# Patient Record
Sex: FEMALE | Race: WHITE | NOT HISPANIC OR LATINO | Employment: OTHER | URBAN - METROPOLITAN AREA
[De-identification: names, ages, dates, MRNs, and addresses within clinical notes are randomized per-mention and may not be internally consistent; named-entity substitution may affect disease eponyms.]

---

## 2017-11-08 ENCOUNTER — HOSPITAL ENCOUNTER (INPATIENT)
Facility: HOSPITAL | Age: 82
LOS: 13 days | Discharge: NURSING, REHAB OR LONG-TERM CARE | End: 2017-11-21
Attending: INTERNAL MEDICINE | Admitting: INTERNAL MEDICINE

## 2017-11-08 ENCOUNTER — APPOINTMENT (OUTPATIENT)
Dept: GENERAL RADIOLOGY | Facility: HOSPITAL | Age: 82
End: 2017-11-08

## 2017-11-08 DIAGNOSIS — A41.51 SEPSIS DUE TO ESCHERICHIA COLI (HCC): Primary | ICD-10-CM

## 2017-11-08 DIAGNOSIS — N17.9 ACUTE RENAL FAILURE, UNSPECIFIED ACUTE RENAL FAILURE TYPE (HCC): ICD-10-CM

## 2017-11-08 DIAGNOSIS — Z74.09 IMPAIRED FUNCTIONAL MOBILITY, BALANCE, GAIT, AND ENDURANCE: ICD-10-CM

## 2017-11-08 PROBLEM — A41.9 SEPSIS (HCC): Status: ACTIVE | Noted: 2017-11-08

## 2017-11-08 LAB
ANION GAP SERPL CALCULATED.3IONS-SCNC: 21 MMOL/L (ref 3–11)
APTT PPP: 32.2 SECONDS (ref 24–31)
ARTERIAL PATENCY WRIST A: ABNORMAL
ATMOSPHERIC PRESS: ABNORMAL MMHG
BACTERIA UR QL AUTO: ABNORMAL /HPF
BASE EXCESS BLDA CALC-SCNC: -8.1 MMOL/L (ref 0–2)
BASOPHILS # BLD AUTO: 0.05 10*3/MM3 (ref 0–0.2)
BASOPHILS NFR BLD AUTO: 0.3 % (ref 0–1)
BDY SITE: ABNORMAL
BILIRUB UR QL STRIP: NEGATIVE
BNP SERPL-MCNC: 532 PG/ML (ref 0–100)
BUN BLD-MCNC: 68 MG/DL (ref 9–23)
BUN/CREAT SERPL: 15.1 (ref 7–25)
CA-I SERPL ISE-MCNC: 1.03 MMOL/L (ref 1.12–1.32)
CALCIUM SPEC-SCNC: 7.1 MG/DL (ref 8.7–10.4)
CHLORIDE SERPL-SCNC: 105 MMOL/L (ref 99–109)
CK SERPL-CCNC: 49 U/L (ref 26–174)
CLARITY UR: ABNORMAL
CLUMPED PLATELETS: PRESENT
CO2 BLDA-SCNC: 17.9 MMOL/L (ref 22–33)
CO2 SERPL-SCNC: 10 MMOL/L (ref 20–31)
COHGB MFR BLD: 1.3 % (ref 0–2)
COLOR UR: YELLOW
CREAT BLD-MCNC: 4.5 MG/DL (ref 0.6–1.3)
CREAT UR-MCNC: 18.8 MG/DL
D-LACTATE SERPL-SCNC: 1.4 MMOL/L (ref 0.5–2)
DEPRECATED RDW RBC AUTO: 49.9 FL (ref 37–54)
EOSINOPHIL # BLD AUTO: 0.48 10*3/MM3 (ref 0–0.3)
EOSINOPHIL NFR BLD AUTO: 3.3 % (ref 0–3)
ERYTHROCYTE [DISTWIDTH] IN BLOOD BY AUTOMATED COUNT: 14.2 % (ref 11.3–14.5)
GFR SERPL CREATININE-BSD FRML MDRD: 9 ML/MIN/1.73
GLUCOSE BLD-MCNC: 68 MG/DL (ref 70–100)
GLUCOSE BLDC GLUCOMTR-MCNC: 64 MG/DL (ref 70–130)
GLUCOSE BLDC GLUCOMTR-MCNC: 71 MG/DL (ref 70–130)
GLUCOSE UR STRIP-MCNC: NEGATIVE MG/DL
HCO3 BLDA-SCNC: 16.9 MMOL/L (ref 20–26)
HCT VFR BLD AUTO: 36.3 % (ref 34.5–44)
HCT VFR BLD CALC: 33.5 %
HGB BLD-MCNC: 12.6 G/DL (ref 11.5–15.5)
HGB BLDA-MCNC: 10.9 G/DL (ref 14–18)
HGB UR QL STRIP.AUTO: ABNORMAL
HOROWITZ INDEX BLD+IHG-RTO: 28 %
HYALINE CASTS UR QL AUTO: ABNORMAL /LPF
IMM GRANULOCYTES # BLD: 0.11 10*3/MM3 (ref 0–0.03)
IMM GRANULOCYTES NFR BLD: 0.8 % (ref 0–0.6)
KETONES UR QL STRIP: NEGATIVE
LDH SERPL-CCNC: 182 U/L (ref 120–246)
LEUKOCYTE ESTERASE UR QL STRIP.AUTO: ABNORMAL
LYMPHOCYTES # BLD AUTO: 1.2 10*3/MM3 (ref 0.6–4.8)
LYMPHOCYTES NFR BLD AUTO: 8.4 % (ref 24–44)
MAGNESIUM SERPL-MCNC: 1.9 MG/DL (ref 1.3–2.7)
MCH RBC QN AUTO: 33 PG (ref 27–31)
MCHC RBC AUTO-ENTMCNC: 34.7 G/DL (ref 32–36)
MCV RBC AUTO: 95 FL (ref 80–99)
METHGB BLD QL: 0.8 % (ref 0–1.5)
MODALITY: ABNORMAL
MONOCYTES # BLD AUTO: 1.42 10*3/MM3 (ref 0–1)
MONOCYTES NFR BLD AUTO: 9.9 % (ref 0–12)
NEUTROPHILS # BLD AUTO: 11.09 10*3/MM3 (ref 1.5–8.3)
NEUTROPHILS NFR BLD AUTO: 77.3 % (ref 41–71)
NITRITE UR QL STRIP: NEGATIVE
OSMOLALITY UR: 283 MOSM/KG (ref 300–1100)
OXYHGB MFR BLDV: 94.7 % (ref 94–99)
PCO2 BLDA: 32 MM HG (ref 35–45)
PH BLDA: 7.33 PH UNITS (ref 7.35–7.45)
PH UR STRIP.AUTO: 6 [PH] (ref 5–8)
PHOSPHATE SERPL-MCNC: 5.2 MG/DL (ref 2.4–5.1)
PLATELET # BLD AUTO: 170 10*3/MM3 (ref 150–450)
PMV BLD AUTO: 11.2 FL (ref 6–12)
PO2 BLDA: 81.1 MM HG (ref 83–108)
POTASSIUM BLD-SCNC: 3.6 MMOL/L (ref 3.5–5.5)
PROCALCITONIN SERPL-MCNC: 6.27 NG/ML
PROT UR QL STRIP: ABNORMAL
PROT UR-MCNC: 49 MG/DL (ref 1–14)
RBC # BLD AUTO: 3.82 10*6/MM3 (ref 3.89–5.14)
RBC # UR: ABNORMAL /HPF
RBC MORPH BLD: NORMAL
REF LAB TEST METHOD: ABNORMAL
SMALL PLATELETS BLD QL SMEAR: ADEQUATE
SODIUM BLD-SCNC: 136 MMOL/L (ref 132–146)
SODIUM UR-SCNC: 102 MMOL/L (ref 30–90)
SP GR UR STRIP: 1.01 (ref 1–1.03)
SQUAMOUS #/AREA URNS HPF: ABNORMAL /HPF
UROBILINOGEN UR QL STRIP: ABNORMAL
VANCOMYCIN SERPL-MCNC: 14 MCG/ML (ref 5–40)
WBC MORPH BLD: NORMAL
WBC NRBC COR # BLD: 14.35 10*3/MM3 (ref 3.5–10.8)
WBC UR QL AUTO: ABNORMAL /HPF

## 2017-11-08 PROCEDURE — 25010000002 PIPERACILLIN SOD-TAZOBACTAM PER 1 G: Performed by: NURSE PRACTITIONER

## 2017-11-08 PROCEDURE — 83880 ASSAY OF NATRIURETIC PEPTIDE: CPT | Performed by: NURSE PRACTITIONER

## 2017-11-08 PROCEDURE — 85025 COMPLETE CBC W/AUTO DIFF WBC: CPT | Performed by: NURSE PRACTITIONER

## 2017-11-08 PROCEDURE — 25010000002 HEPARIN (PORCINE) PER 1000 UNITS: Performed by: INTERNAL MEDICINE

## 2017-11-08 PROCEDURE — 93010 ELECTROCARDIOGRAM REPORT: CPT | Performed by: INTERNAL MEDICINE

## 2017-11-08 PROCEDURE — 82805 BLOOD GASES W/O2 SATURATION: CPT | Performed by: NURSE PRACTITIONER

## 2017-11-08 PROCEDURE — 87077 CULTURE AEROBIC IDENTIFY: CPT | Performed by: NURSE PRACTITIONER

## 2017-11-08 PROCEDURE — 94640 AIRWAY INHALATION TREATMENT: CPT

## 2017-11-08 PROCEDURE — 85730 THROMBOPLASTIN TIME PARTIAL: CPT | Performed by: NURSE PRACTITIONER

## 2017-11-08 PROCEDURE — 82570 ASSAY OF URINE CREATININE: CPT | Performed by: NURSE PRACTITIONER

## 2017-11-08 PROCEDURE — 99291 CRITICAL CARE FIRST HOUR: CPT | Performed by: INTERNAL MEDICINE

## 2017-11-08 PROCEDURE — 93005 ELECTROCARDIOGRAM TRACING: CPT | Performed by: NURSE PRACTITIONER

## 2017-11-08 PROCEDURE — 87040 BLOOD CULTURE FOR BACTERIA: CPT | Performed by: NURSE PRACTITIONER

## 2017-11-08 PROCEDURE — 84100 ASSAY OF PHOSPHORUS: CPT | Performed by: NURSE PRACTITIONER

## 2017-11-08 PROCEDURE — 84145 PROCALCITONIN (PCT): CPT | Performed by: NURSE PRACTITIONER

## 2017-11-08 PROCEDURE — 83605 ASSAY OF LACTIC ACID: CPT | Performed by: NURSE PRACTITIONER

## 2017-11-08 PROCEDURE — 94799 UNLISTED PULMONARY SVC/PX: CPT

## 2017-11-08 PROCEDURE — 87186 SC STD MICRODIL/AGAR DIL: CPT | Performed by: NURSE PRACTITIONER

## 2017-11-08 PROCEDURE — 85007 BL SMEAR W/DIFF WBC COUNT: CPT | Performed by: NURSE PRACTITIONER

## 2017-11-08 PROCEDURE — 82330 ASSAY OF CALCIUM: CPT | Performed by: NURSE PRACTITIONER

## 2017-11-08 PROCEDURE — 83615 LACTATE (LD) (LDH) ENZYME: CPT | Performed by: NURSE PRACTITIONER

## 2017-11-08 PROCEDURE — 36600 WITHDRAWAL OF ARTERIAL BLOOD: CPT | Performed by: NURSE PRACTITIONER

## 2017-11-08 PROCEDURE — 71010 HC CHEST PA OR AP: CPT

## 2017-11-08 PROCEDURE — 83935 ASSAY OF URINE OSMOLALITY: CPT | Performed by: NURSE PRACTITIONER

## 2017-11-08 PROCEDURE — 83735 ASSAY OF MAGNESIUM: CPT | Performed by: NURSE PRACTITIONER

## 2017-11-08 PROCEDURE — 82550 ASSAY OF CK (CPK): CPT | Performed by: NURSE PRACTITIONER

## 2017-11-08 PROCEDURE — 80048 BASIC METABOLIC PNL TOTAL CA: CPT | Performed by: NURSE PRACTITIONER

## 2017-11-08 PROCEDURE — 94660 CPAP INITIATION&MGMT: CPT

## 2017-11-08 PROCEDURE — 80202 ASSAY OF VANCOMYCIN: CPT | Performed by: NURSE PRACTITIONER

## 2017-11-08 PROCEDURE — 87086 URINE CULTURE/COLONY COUNT: CPT | Performed by: NURSE PRACTITIONER

## 2017-11-08 PROCEDURE — 84156 ASSAY OF PROTEIN URINE: CPT | Performed by: NURSE PRACTITIONER

## 2017-11-08 PROCEDURE — 84300 ASSAY OF URINE SODIUM: CPT | Performed by: NURSE PRACTITIONER

## 2017-11-08 PROCEDURE — 81001 URINALYSIS AUTO W/SCOPE: CPT | Performed by: NURSE PRACTITIONER

## 2017-11-08 PROCEDURE — 82962 GLUCOSE BLOOD TEST: CPT

## 2017-11-08 RX ORDER — POTASSIUM CHLORIDE 750 MG/1
10 TABLET, FILM COATED, EXTENDED RELEASE ORAL DAILY
Status: ON HOLD | COMMUNITY
End: 2017-11-21

## 2017-11-08 RX ORDER — ONDANSETRON 2 MG/ML
4 INJECTION INTRAMUSCULAR; INTRAVENOUS EVERY 6 HOURS PRN
Status: DISCONTINUED | OUTPATIENT
Start: 2017-11-08 | End: 2017-11-21 | Stop reason: HOSPADM

## 2017-11-08 RX ORDER — IPRATROPIUM BROMIDE AND ALBUTEROL SULFATE 2.5; .5 MG/3ML; MG/3ML
3 SOLUTION RESPIRATORY (INHALATION) EVERY 6 HOURS PRN
Status: DISCONTINUED | OUTPATIENT
Start: 2017-11-08 | End: 2017-11-11

## 2017-11-08 RX ORDER — VANCOMYCIN/0.9 % SOD CHLORIDE 1.5G/250ML
20 PLASTIC BAG, INJECTION (ML) INTRAVENOUS ONCE
Status: DISCONTINUED | OUTPATIENT
Start: 2017-11-08 | End: 2017-11-08

## 2017-11-08 RX ORDER — RANITIDINE 150 MG/1
150 TABLET ORAL 2 TIMES DAILY
COMMUNITY

## 2017-11-08 RX ORDER — BISACODYL 10 MG
10 SUPPOSITORY, RECTAL RECTAL DAILY PRN
Status: DISCONTINUED | OUTPATIENT
Start: 2017-11-08 | End: 2017-11-21 | Stop reason: HOSPADM

## 2017-11-08 RX ORDER — ACETAMINOPHEN 325 MG/1
650 TABLET ORAL EVERY 4 HOURS PRN
Status: DISCONTINUED | OUTPATIENT
Start: 2017-11-08 | End: 2017-11-21 | Stop reason: HOSPADM

## 2017-11-08 RX ORDER — DEXTROSE MONOHYDRATE 25 G/50ML
25 INJECTION, SOLUTION INTRAVENOUS
Status: DISCONTINUED | OUTPATIENT
Start: 2017-11-08 | End: 2017-11-11

## 2017-11-08 RX ORDER — HEPARIN SODIUM 5000 [USP'U]/ML
5000 INJECTION, SOLUTION INTRAVENOUS; SUBCUTANEOUS EVERY 8 HOURS SCHEDULED
Status: DISCONTINUED | OUTPATIENT
Start: 2017-11-08 | End: 2017-11-17

## 2017-11-08 RX ORDER — FAMOTIDINE 10 MG/ML
20 INJECTION, SOLUTION INTRAVENOUS EVERY 12 HOURS SCHEDULED
Status: DISCONTINUED | OUTPATIENT
Start: 2017-11-08 | End: 2017-11-08

## 2017-11-08 RX ORDER — ATORVASTATIN CALCIUM 40 MG/1
40 TABLET, FILM COATED ORAL NIGHTLY
COMMUNITY

## 2017-11-08 RX ORDER — TRIAMTERENE AND HYDROCHLOROTHIAZIDE 37.5; 25 MG/1; MG/1
1 TABLET ORAL DAILY
COMMUNITY
End: 2017-11-21 | Stop reason: HOSPADM

## 2017-11-08 RX ORDER — NICOTINE POLACRILEX 4 MG
15 LOZENGE BUCCAL
Status: DISCONTINUED | OUTPATIENT
Start: 2017-11-08 | End: 2017-11-11

## 2017-11-08 RX ORDER — GEMFIBROZIL 600 MG/1
600 TABLET, FILM COATED ORAL 2 TIMES DAILY
COMMUNITY

## 2017-11-08 RX ORDER — SODIUM CHLORIDE 0.9 % (FLUSH) 0.9 %
1-10 SYRINGE (ML) INJECTION AS NEEDED
Status: DISCONTINUED | OUTPATIENT
Start: 2017-11-08 | End: 2017-11-09

## 2017-11-08 RX ORDER — SODIUM CHLORIDE, SODIUM LACTATE, POTASSIUM CHLORIDE, CALCIUM CHLORIDE 600; 310; 30; 20 MG/100ML; MG/100ML; MG/100ML; MG/100ML
100 INJECTION, SOLUTION INTRAVENOUS CONTINUOUS
Status: DISCONTINUED | OUTPATIENT
Start: 2017-11-08 | End: 2017-11-08

## 2017-11-08 RX ORDER — ACETAMINOPHEN 650 MG/1
650 SUPPOSITORY RECTAL EVERY 4 HOURS PRN
Status: DISCONTINUED | OUTPATIENT
Start: 2017-11-08 | End: 2017-11-13

## 2017-11-08 RX ORDER — MIDODRINE HYDROCHLORIDE 5 MG/1
10 TABLET ORAL EVERY 8 HOURS SCHEDULED
Status: DISCONTINUED | OUTPATIENT
Start: 2017-11-08 | End: 2017-11-13

## 2017-11-08 RX ADMIN — IPRATROPIUM BROMIDE AND ALBUTEROL SULFATE 3 ML: .5; 3 SOLUTION RESPIRATORY (INHALATION) at 22:41

## 2017-11-08 RX ADMIN — HEPARIN SODIUM 5000 UNITS: 5000 INJECTION, SOLUTION INTRAVENOUS; SUBCUTANEOUS at 22:03

## 2017-11-08 RX ADMIN — NOREPINEPHRINE BITARTRATE 0.02 MCG/KG/MIN: 8 SOLUTION at 20:58

## 2017-11-08 RX ADMIN — ACETAMINOPHEN 650 MG: 325 TABLET ORAL at 23:17

## 2017-11-08 RX ADMIN — SODIUM BICARBONATE 150 ML/HR: 84 INJECTION, SOLUTION INTRAVENOUS at 22:05

## 2017-11-08 RX ADMIN — PHENOL 2 SPRAY: 1.5 LIQUID ORAL at 21:11

## 2017-11-08 RX ADMIN — TAZOBACTAM SODIUM AND PIPERACILLIN SODIUM 4.5 G: 500; 4 INJECTION, SOLUTION INTRAVENOUS at 22:04

## 2017-11-08 RX ADMIN — SODIUM CHLORIDE, POTASSIUM CHLORIDE, SODIUM LACTATE AND CALCIUM CHLORIDE 100 ML/HR: 600; 310; 30; 20 INJECTION, SOLUTION INTRAVENOUS at 20:58

## 2017-11-08 RX ADMIN — MIDODRINE HYDROCHLORIDE 10 MG: 5 TABLET ORAL at 22:04

## 2017-11-08 NOTE — NURSING NOTE
ACC REVIEW REPORT: [facility]        PATIENT NAME: Jaclyn Garcia    PATIENT ID: [id number]    BED: N232    BED TYPE: ICU    BED GIVEN TO: Virginia MOLINA BED GIVEN: 1320    YOB: 1934    AGE: 84 yo    GENDER: Female    PREVIOUS ADMIT TO [facility]:     PREVIOUS ADMISSION DATE:     PATIENT CLASS: Inpatient    TODAY'S DATE: 11/8/2017    TRANSFER DATE: 11/8/2017    ETA:     TRANSFERRING FACILITY: [facility]    TRANSFERRING FACILITY PHONE # : [phone]    TRANSFERRING MD:     DATE/TIME REQUEST RECEIVED: 11/8/2017 at 1320    [facility] RN: Dorothy Johnson RN     REPORT FROM: Virginia Varner RN    TIME REPORT TAKEN: 1412    DIAGNOSIS: Urosepsis, PNA, AKF    REASON FOR TRANSFER TO [facility]: Higher Level of Care    TRANSPORTATION: Ambulance    CLINICAL REASON FOR TRANSFER TO [facility]: Patient admitted to [facility] on 11/6/2017 with UTI, Sepsis, ELLE. She was fluid overloaded this morning and developed respiratory distress. Transferred to the ICU on Bipap.      CLINICAL INFORMATION    HEIGHT:     WEIGHT:     ALLERGIES: Niacin    CALI: #18 Fr    INFECTIOUS DISEASE: None    ISOLATION:     1ST VITAL SIGNS:   TIME:   TEMP:   PULSE:   B/P:   RESP:     LAST VITAL SIGNS:  TIME:   TEMP: 36.6 c  PULSE: 71  B/P: 129/62  RESP: 22    LAB INFORMATION: BUN-56, Crea-4.8, WBC-14.3, H/H-11.1/33.4, Lactate-1.3, Ca+-7.4, CO2-16    CULTURE INFORMATION:     MEDS/IV FLUIDS: See MAR sent with patient. Has a #20 RAC-Dopamine @ 5mcg/kg/min and a #20 RFA-Bicarb @ 100 ml/hr. Order to start Bumex gtt.      CARDIAC SYSTEM:    CHEST PAIN: none    RATE:     SCALE:     RHYTHM: SR/ST    Is patient taking or has patient been given any drugs that could increase bleeding? None  (Plavix, Brilinta, Effient, Eliquis, Xarelto, Warfarin, Integrilin, Angiomax)    DRUG:      DOSE/FREQUENCY:     CARDIAC ENZYMES:    DATE:   TIME:   CK:   CKMB:   KAREN:   TROP:     DATE:   TIME:   CK:   CKMB:   KAREN:   TROP:       HEART CATH:     HEART CATH DATE:     HEART CATH RESULTS:     LAD:   RCA:   CX:    LMAIN:   EF:     SWAN:     SITE:   SIZE:    DATE INSERTED:     ARTLINE:     SITE:   SIZE:   DATE INSERTED:     SHEATH:    SITE:   SIZE:   DATE INSERTED:         VASOSEAL:    SITE:   DATE INSERTED:     EXTERNAL PACEMAKER:     RATE:   EXT PACER ON:     MODE:    DATE INSERTED:   OUTPUT SETTING:   SENSITIVITY SETTING:   SENSITIVITY TYPE:     IABP:    SITE:   AUG PRESSURE:   DATE INSERTED:     CARDIAC NOTES:       RESPIRATORY SYSTEM:    LUNG SOUNDS:    CLEAR:   CRACKLES: Yes  WHEEZES: Yes  RHONCHI:   DIMINISHED:     ABG DATE:         ABG TIME:     ABG RESULTS:    PH:   PO2:   PCO2:   HCO3:   O2 SAT:       ETT:     ETT SIZE:     ORAL:     NASAL:     SECURED AT MEASUREMENT (CM):     ON VENTILATOR:    TV:   FI02:   RATE:   PEEP:     OXYGEN: Bipap at 40 %    O2 SAT: 100%    ADMINISTRATION ROUTE:     IMAGING FINDINGS:     PNEUMO LOCATION:     PNEUMO SIZE:     PNEUMO CHEST TUBE SEAL TYPE:     RADIOLOGY RESULTS:     RESPIRATORY STATUS:       CNS/MUSCULOSKELETAL    ALERT AND ORIENTED:    PERSON: Yes  PLACE: Yes  TIME: Yes    INJURY:  WHERE:     MILDRED COMA SCALE:    E:   M:   V:     STROKE SCALE:     SIZE OF HEMORRHAGE:     SYMPTOMS: (CHOOSE APPROPRIATE)    ASPHASIA:   ATAXIA:   DYSARTHRIA:   DYSPHASIA:   HEADACHE:   PARALYSIS:   SEIZURE:   SYNCOPE:   VERTIGO:   VISION CHANGE:        EXTREMITY WEAKNESS:    LEFT ARM:   RIGHT ARM:   LEFT LEG:   RIGHT LEG:     CAT SCAN RESULTS:     MRI RESULTS:     CNS/MUSCULOSKELETAL NOTES: Patient confused last night but has improved through out the day      GI//GY      ABDOMINAL PAIN:     VOMITING:     DIARRHEA:     NAUSEA:     BOWEL SOUNDS:     OCCULT STOOL:     VAGINAL BLEEDING:     TESTICULAR PAIN:     HEMATURIA:     NG TUBE:    SIZE:   DATE INSERTED:       ULTRASOUND:     ULTRASOUND RESULTS:       ACUTE ABDOMEN:     ACUTE ABDOMEN RESULTS:       CT SCAN:     CT SCAN RESULTS:       GI//GY NOTES: Urine cloudy. Decreased UOP. Output 175 ml since 0700 today.     PAST MEDICAL HISTORY: GERD,  Hyperlipidemia, HTN    OTHER SYMPTOM NOTES:     ADDITIONAL NOTES: Has order for PICC line. May or may not get placed prior to transfer.           Dorothy Johnson RN  11/8/2017  2:31 PM

## 2017-11-09 ENCOUNTER — APPOINTMENT (OUTPATIENT)
Dept: GENERAL RADIOLOGY | Facility: HOSPITAL | Age: 82
End: 2017-11-09

## 2017-11-09 ENCOUNTER — APPOINTMENT (OUTPATIENT)
Dept: ULTRASOUND IMAGING | Facility: HOSPITAL | Age: 82
End: 2017-11-09

## 2017-11-09 PROBLEM — K52.9 GASTROENTERITIS: Status: ACTIVE | Noted: 2017-11-09

## 2017-11-09 PROBLEM — N39.0 UTI (URINARY TRACT INFECTION): Status: ACTIVE | Noted: 2017-11-09

## 2017-11-09 LAB
ABO GROUP BLD: NORMAL
ABO GROUP BLD: NORMAL
ALBUMIN SERPL-MCNC: 2.5 G/DL (ref 3.2–4.8)
ALBUMIN/GLOB SERPL: 1.5 G/DL (ref 1.5–2.5)
ALP SERPL-CCNC: 67 U/L (ref 25–100)
ALT SERPL W P-5'-P-CCNC: 8 U/L (ref 7–40)
ANION GAP SERPL CALCULATED.3IONS-SCNC: 12 MMOL/L (ref 3–11)
ARTERIAL PATENCY WRIST A: ABNORMAL
AST SERPL-CCNC: 17 U/L (ref 0–33)
ATMOSPHERIC PRESS: ABNORMAL MMHG
BASE EXCESS BLDA CALC-SCNC: -1.1 MMOL/L (ref 0–2)
BASOPHILS # BLD AUTO: 0.05 10*3/MM3 (ref 0–0.2)
BASOPHILS NFR BLD AUTO: 0.4 % (ref 0–1)
BDY SITE: ABNORMAL
BILIRUB SERPL-MCNC: 0.4 MG/DL (ref 0.3–1.2)
BLD GP AB SCN SERPL QL: NEGATIVE
BNP SERPL-MCNC: 410 PG/ML (ref 0–100)
BUN BLD-MCNC: 70 MG/DL (ref 9–23)
BUN/CREAT SERPL: 15.2 (ref 7–25)
C DIFF TOX GENS STL QL NAA+PROBE: NOT DETECTED
CA-I SERPL ISE-MCNC: 1 MMOL/L (ref 1.12–1.32)
CALCIUM SPEC-SCNC: 6.7 MG/DL (ref 8.7–10.4)
CHLORIDE SERPL-SCNC: 102 MMOL/L (ref 99–109)
CK SERPL-CCNC: 42 U/L (ref 26–174)
CO2 BLDA-SCNC: 23.9 MMOL/L (ref 22–33)
CO2 SERPL-SCNC: 22 MMOL/L (ref 20–31)
COHGB MFR BLD: 1.4 % (ref 0–2)
CORTIS SERPL-MCNC: 15.7 MCG/DL
CREAT BLD-MCNC: 4.6 MG/DL (ref 0.6–1.3)
D-LACTATE SERPL-SCNC: 1.5 MMOL/L (ref 0.5–2)
DEPRECATED RDW RBC AUTO: 48.9 FL (ref 37–54)
EOSINOPHIL # BLD AUTO: 0.45 10*3/MM3 (ref 0–0.3)
EOSINOPHIL NFR BLD AUTO: 3.7 % (ref 0–3)
ERYTHROCYTE [DISTWIDTH] IN BLOOD BY AUTOMATED COUNT: 14.2 % (ref 11.3–14.5)
GFR SERPL CREATININE-BSD FRML MDRD: 9 ML/MIN/1.73
GLOBULIN UR ELPH-MCNC: 1.7 GM/DL
GLUCOSE BLD-MCNC: 112 MG/DL (ref 70–100)
GLUCOSE BLDC GLUCOMTR-MCNC: 110 MG/DL (ref 70–130)
GLUCOSE BLDC GLUCOMTR-MCNC: 120 MG/DL (ref 70–130)
GLUCOSE BLDC GLUCOMTR-MCNC: 130 MG/DL (ref 70–130)
GLUCOSE BLDC GLUCOMTR-MCNC: 85 MG/DL (ref 70–130)
GLUCOSE BLDC GLUCOMTR-MCNC: 94 MG/DL (ref 70–130)
HBA1C MFR BLD: 5.6 % (ref 4.8–5.6)
HCO3 BLDA-SCNC: 22.9 MMOL/L (ref 20–26)
HCT VFR BLD AUTO: 29.6 % (ref 34.5–44)
HCT VFR BLD CALC: 31.2 %
HGB BLD-MCNC: 10.2 G/DL (ref 11.5–15.5)
HGB BLDA-MCNC: 10.2 G/DL (ref 14–18)
HOROWITZ INDEX BLD+IHG-RTO: 28 %
IMM GRANULOCYTES # BLD: 0.1 10*3/MM3 (ref 0–0.03)
IMM GRANULOCYTES NFR BLD: 0.8 % (ref 0–0.6)
LYMPHOCYTES # BLD AUTO: 1.09 10*3/MM3 (ref 0.6–4.8)
LYMPHOCYTES NFR BLD AUTO: 9 % (ref 24–44)
MAGNESIUM SERPL-MCNC: 1.9 MG/DL (ref 1.3–2.7)
MCH RBC QN AUTO: 32.3 PG (ref 27–31)
MCHC RBC AUTO-ENTMCNC: 34.5 G/DL (ref 32–36)
MCV RBC AUTO: 93.7 FL (ref 80–99)
METHGB BLD QL: 0.8 % (ref 0–1.5)
MODALITY: ABNORMAL
MONOCYTES # BLD AUTO: 1.33 10*3/MM3 (ref 0–1)
MONOCYTES NFR BLD AUTO: 11 % (ref 0–12)
NEUTROPHILS # BLD AUTO: 9.04 10*3/MM3 (ref 1.5–8.3)
NEUTROPHILS NFR BLD AUTO: 75.1 % (ref 41–71)
OXYHGB MFR BLDV: 95.1 % (ref 94–99)
PCO2 BLDA: 34.4 MM HG (ref 35–45)
PH BLDA: 7.43 PH UNITS (ref 7.35–7.45)
PHOSPHATE SERPL-MCNC: 5.3 MG/DL (ref 2.4–5.1)
PLATELET # BLD AUTO: 166 10*3/MM3 (ref 150–450)
PMV BLD AUTO: 11 FL (ref 6–12)
PO2 BLDA: 78.6 MM HG (ref 83–108)
POTASSIUM BLD-SCNC: 3.2 MMOL/L (ref 3.5–5.5)
POTASSIUM BLD-SCNC: 3.5 MMOL/L (ref 3.5–5.5)
PROT SERPL-MCNC: 4.2 G/DL (ref 5.7–8.2)
RBC # BLD AUTO: 3.16 10*6/MM3 (ref 3.89–5.14)
RH BLD: POSITIVE
RH BLD: POSITIVE
SODIUM BLD-SCNC: 136 MMOL/L (ref 132–146)
TSH SERPL DL<=0.05 MIU/L-ACNC: 1.19 MIU/ML (ref 0.35–5.35)
URATE SERPL-MCNC: 7.2 MG/DL (ref 3.1–7.8)
WBC NRBC COR # BLD: 12.06 10*3/MM3 (ref 3.5–10.8)

## 2017-11-09 PROCEDURE — 82533 TOTAL CORTISOL: CPT | Performed by: INTERNAL MEDICINE

## 2017-11-09 PROCEDURE — 36600 WITHDRAWAL OF ARTERIAL BLOOD: CPT | Performed by: NURSE PRACTITIONER

## 2017-11-09 PROCEDURE — 82962 GLUCOSE BLOOD TEST: CPT

## 2017-11-09 PROCEDURE — 87493 C DIFF AMPLIFIED PROBE: CPT | Performed by: INTERNAL MEDICINE

## 2017-11-09 PROCEDURE — 83880 ASSAY OF NATRIURETIC PEPTIDE: CPT | Performed by: INTERNAL MEDICINE

## 2017-11-09 PROCEDURE — 83735 ASSAY OF MAGNESIUM: CPT | Performed by: NURSE PRACTITIONER

## 2017-11-09 PROCEDURE — 83605 ASSAY OF LACTIC ACID: CPT | Performed by: NURSE PRACTITIONER

## 2017-11-09 PROCEDURE — 86850 RBC ANTIBODY SCREEN: CPT | Performed by: NURSE PRACTITIONER

## 2017-11-09 PROCEDURE — 94760 N-INVAS EAR/PLS OXIMETRY 1: CPT

## 2017-11-09 PROCEDURE — 99291 CRITICAL CARE FIRST HOUR: CPT | Performed by: INTERNAL MEDICINE

## 2017-11-09 PROCEDURE — 86901 BLOOD TYPING SEROLOGIC RH(D): CPT | Performed by: NURSE PRACTITIONER

## 2017-11-09 PROCEDURE — 85025 COMPLETE CBC W/AUTO DIFF WBC: CPT | Performed by: NURSE PRACTITIONER

## 2017-11-09 PROCEDURE — 84443 ASSAY THYROID STIM HORMONE: CPT | Performed by: NURSE PRACTITIONER

## 2017-11-09 PROCEDURE — 84550 ASSAY OF BLOOD/URIC ACID: CPT | Performed by: INTERNAL MEDICINE

## 2017-11-09 PROCEDURE — 80053 COMPREHEN METABOLIC PANEL: CPT | Performed by: NURSE PRACTITIONER

## 2017-11-09 PROCEDURE — 82330 ASSAY OF CALCIUM: CPT | Performed by: NURSE PRACTITIONER

## 2017-11-09 PROCEDURE — 25010000002 PIPERACILLIN SOD-TAZOBACTAM PER 1 G: Performed by: NURSE PRACTITIONER

## 2017-11-09 PROCEDURE — 71010 HC CHEST PA OR AP: CPT

## 2017-11-09 PROCEDURE — 83036 HEMOGLOBIN GLYCOSYLATED A1C: CPT | Performed by: NURSE PRACTITIONER

## 2017-11-09 PROCEDURE — 94799 UNLISTED PULMONARY SVC/PX: CPT

## 2017-11-09 PROCEDURE — B548ZZA ULTRASONOGRAPHY OF SUPERIOR VENA CAVA, GUIDANCE: ICD-10-PCS | Performed by: INTERNAL MEDICINE

## 2017-11-09 PROCEDURE — 86900 BLOOD TYPING SEROLOGIC ABO: CPT

## 2017-11-09 PROCEDURE — 76775 US EXAM ABDO BACK WALL LIM: CPT

## 2017-11-09 PROCEDURE — 82805 BLOOD GASES W/O2 SATURATION: CPT | Performed by: NURSE PRACTITIONER

## 2017-11-09 PROCEDURE — 82550 ASSAY OF CK (CPK): CPT | Performed by: INTERNAL MEDICINE

## 2017-11-09 PROCEDURE — 86901 BLOOD TYPING SEROLOGIC RH(D): CPT

## 2017-11-09 PROCEDURE — 84132 ASSAY OF SERUM POTASSIUM: CPT | Performed by: INTERNAL MEDICINE

## 2017-11-09 PROCEDURE — 84100 ASSAY OF PHOSPHORUS: CPT | Performed by: NURSE PRACTITIONER

## 2017-11-09 PROCEDURE — 36556 INSERT NON-TUNNEL CV CATH: CPT | Performed by: NURSE PRACTITIONER

## 2017-11-09 PROCEDURE — 86900 BLOOD TYPING SEROLOGIC ABO: CPT | Performed by: NURSE PRACTITIONER

## 2017-11-09 PROCEDURE — 02HV33Z INSERTION OF INFUSION DEVICE INTO SUPERIOR VENA CAVA, PERCUTANEOUS APPROACH: ICD-10-PCS | Performed by: INTERNAL MEDICINE

## 2017-11-09 PROCEDURE — 25010000002 HEPARIN (PORCINE) PER 1000 UNITS: Performed by: INTERNAL MEDICINE

## 2017-11-09 RX ORDER — SODIUM CHLORIDE 9 MG/ML
50 INJECTION, SOLUTION INTRAVENOUS CONTINUOUS
Status: DISCONTINUED | OUTPATIENT
Start: 2017-11-09 | End: 2017-11-12

## 2017-11-09 RX ORDER — POTASSIUM CHLORIDE 750 MG/1
40 CAPSULE, EXTENDED RELEASE ORAL ONCE
Status: COMPLETED | OUTPATIENT
Start: 2017-11-09 | End: 2017-11-09

## 2017-11-09 RX ORDER — IPRATROPIUM BROMIDE AND ALBUTEROL SULFATE 2.5; .5 MG/3ML; MG/3ML
3 SOLUTION RESPIRATORY (INHALATION)
Status: DISCONTINUED | OUTPATIENT
Start: 2017-11-09 | End: 2017-11-11

## 2017-11-09 RX ADMIN — SODIUM CHLORIDE 125 ML/HR: 9 INJECTION, SOLUTION INTRAVENOUS at 21:06

## 2017-11-09 RX ADMIN — PHENOL 2 SPRAY: 1.5 LIQUID ORAL at 11:54

## 2017-11-09 RX ADMIN — MIDODRINE HYDROCHLORIDE 10 MG: 5 TABLET ORAL at 14:51

## 2017-11-09 RX ADMIN — HEPARIN SODIUM 5000 UNITS: 5000 INJECTION, SOLUTION INTRAVENOUS; SUBCUTANEOUS at 21:03

## 2017-11-09 RX ADMIN — TAZOBACTAM SODIUM AND PIPERACILLIN SODIUM 4.5 G: 500; 4 INJECTION, SOLUTION INTRAVENOUS at 08:08

## 2017-11-09 RX ADMIN — IPRATROPIUM BROMIDE AND ALBUTEROL SULFATE 3 ML: .5; 3 SOLUTION RESPIRATORY (INHALATION) at 19:56

## 2017-11-09 RX ADMIN — SODIUM CHLORIDE 125 ML/HR: 9 INJECTION, SOLUTION INTRAVENOUS at 11:58

## 2017-11-09 RX ADMIN — MIDODRINE HYDROCHLORIDE 10 MG: 5 TABLET ORAL at 21:03

## 2017-11-09 RX ADMIN — IPRATROPIUM BROMIDE AND ALBUTEROL SULFATE 3 ML: .5; 3 SOLUTION RESPIRATORY (INHALATION) at 10:30

## 2017-11-09 RX ADMIN — HEPARIN SODIUM 5000 UNITS: 5000 INJECTION, SOLUTION INTRAVENOUS; SUBCUTANEOUS at 06:08

## 2017-11-09 RX ADMIN — SODIUM BICARBONATE 150 ML/HR: 84 INJECTION, SOLUTION INTRAVENOUS at 04:41

## 2017-11-09 RX ADMIN — ACETAMINOPHEN 650 MG: 325 TABLET ORAL at 12:04

## 2017-11-09 RX ADMIN — HEPARIN SODIUM 5000 UNITS: 5000 INJECTION, SOLUTION INTRAVENOUS; SUBCUTANEOUS at 14:51

## 2017-11-09 RX ADMIN — POTASSIUM CHLORIDE 40 MEQ: 750 CAPSULE, EXTENDED RELEASE ORAL at 17:54

## 2017-11-09 RX ADMIN — TAZOBACTAM SODIUM AND PIPERACILLIN SODIUM 4.5 G: 500; 4 INJECTION, SOLUTION INTRAVENOUS at 21:03

## 2017-11-09 RX ADMIN — ACETAMINOPHEN 650 MG: 325 TABLET ORAL at 16:11

## 2017-11-09 RX ADMIN — MIDODRINE HYDROCHLORIDE 10 MG: 5 TABLET ORAL at 06:08

## 2017-11-09 RX ADMIN — ACETAMINOPHEN 650 MG: 325 TABLET ORAL at 21:04

## 2017-11-09 NOTE — PROCEDURES
"Insert Central Line At Bedside  Date/Time: 11/9/2017 3:02 PM  Performed by: MARITA ORTEGA  Authorized by: MARITA ORTEGA   Consent: Written consent obtained.  Risks and benefits: risks, benefits and alternatives were discussed  Consent given by: spouse  Procedure consent: procedure consent matches procedure scheduled  Patient identity confirmed: arm band and verbally with patient  Time out: Immediately prior to procedure a \"time out\" was called to verify the correct patient, procedure, equipment, support staff and site/side marked as required.  Indications: vascular access  Anesthesia: local infiltration    Anesthesia:  Local Anesthetic: lidocaine 1% without epinephrine  Anesthetic total: 5 mL    Sedation:  Patient sedated: no  Preparation: skin prepped with ChloraPrep  Skin prep agent dried: skin prep agent completely dried prior to procedure  Sterile barriers: all five maximum sterile barriers used - cap, mask, sterile gown, sterile gloves, and large sterile sheet  Hand hygiene: hand hygiene performed prior to central venous catheter insertion  Location details: left internal jugular  Site selection rationale: renal failure  Patient position: flat  Catheter type: triple lumen  Catheter size: 7 Fr  Pre-procedure: landmarks identified  Ultrasound guidance: yes  Sterile ultrasound techniques: sterile gel and sterile probe covers were used  Number of attempts: 1  Successful placement: yes  Post-procedure: line sutured and dressing applied  Assessment: placement verified by x-ray,  no pneumothorax on x-ray and blood return through all ports (tip of catheter in right brachiocephalic vein, all three ports aspirate dark nonpulsatile blood and flush back well)  Patient tolerance: Patient tolerated the procedure well with no immediate complications              "

## 2017-11-09 NOTE — PROGRESS NOTES
Discharge Planning Assessment  [facility]     Patient Name: Jaclyn Garcia  MRN: [id number]  Today's Date: 11/9/2017    Admit Date: 11/8/2017          Discharge Needs Assessment       11/09/17 1016    Living Environment    Lives With spouse    Living Arrangements house   LIves in ranch style home c basement.  She can stay on main floor.    Home Accessibility no concerns    Type of Financial/Environmental Concern none    Transportation Available car;family or friend will provide    Living Environment    Provides Primary Care For no one    Quality Of Family Relationships supportive    Able to Return to Prior Living Arrangements yes    Discharge Needs Assessment    Concerns To Be Addressed basic needs concerns;adjustment to diagnosis/illness concerns    Readmission Within The Last 30 Days no previous admission in last 30 days    Outpatient/Agency/Support Group Needs --   She used a HH agency in past, but does not know name of the agency in [place].    Equipment Currently Used at Home none            Discharge Plan       11/09/17 1017    Case Management/Social Work Plan    Plan Home    Patient/Family In Agreement With Plan yes    Additional Comments Spoke c Mrs. Garcia and her spouse at .  She lives c her  in ranch style home in [place].  She is independent c ADL's.  She has never used DME.  She had HH in past, but doesnt know name of the agency.  Her PCP is Dr. Lázaro Landon at [facility].  Her goal is to return home c assist from spouse and daughters.  She agrees to go to rehab facility if needed.  CM will follow.         Discharge Placement     No information found                Demographic Summary       11/09/17 1005    Referral Information    Admission Type inpatient    Arrived From another healthcare institution, not defined    Referral Source admission list    Reason For Consult discharge planning    Contact Information    Permission Granted to Share Information With     Primary  Care Physician Information    Name Dr. Orlando Landon at [facility]    Phone [phone]            Functional Status       11/09/17 1015    Functional Status Current    Ambulation 2-->assistive person    Transferring 2-->assistive person    Toileting 2-->assistive person    Bathing 2-->assistive person    Dressing 2-->assistive person    Eating 0-->independent    Communication 0-->understands/communicates without difficulty    Swallowing (if score 2 or more for any item, consult Rehab Services) 0-->swallows foods/liquids without difficulty    Functional Status Prior    Ambulation 0-->independent    Transferring 0-->independent    Toileting 0-->independent    Bathing 0-->independent    Dressing 0-->independent    Eating 0-->independent    Communication 0-->understands/communicates without difficulty    Swallowing 0-->swallows foods/liquids without difficulty    IADL    Medications independent    Meal Preparation independent    Housekeeping independent    Laundry independent    Shopping independent    Oral Care independent            Psychosocial     None            Abuse/Neglect     None            Legal     None            Substance Abuse     None            Patient Forms     None          Aung Watson RN

## 2017-11-09 NOTE — CONSULTS
Referring Provider: Enedina Denis   Reason for Consultation: ELLE    Subjective     Chief complaint Diarrhea    History of present illness:  This is 83 year old female with past medical hx of HTN, HLD and GERD who presented with generalized weakness. She states to have profuse watery diarrhea. She was on HCTZ and was taking Aleve every day for about a year or so. She denies any hx of kidney disease, kidney stone or family hx of kidney disease. She was found to have elevated Scr with metabolic acidosis and hypotension.. She was started on bicarb drip and pressor support. Nephrology service has been consulted for further evaluation.     History  History reviewed. No pertinent past medical history., No past surgical history on file., No family history on file.,   Social History   Substance Use Topics   • Smoking status: Never Smoker   • Smokeless tobacco: None   • Alcohol use No   ,   Prescriptions Prior to Admission   Medication Sig Dispense Refill Last Dose   • atorvastatin (LIPITOR) 40 MG tablet Take 40 mg by mouth Every Night.      • gemfibrozil (LOPID) 600 MG tablet Take 600 mg by mouth 2 (Two) Times a Day.      • potassium chloride (K-DUR) 10 MEQ CR tablet Take 10 mEq by mouth Daily.      • raNITIdine (ZANTAC) 150 MG tablet Take 150 mg by mouth 2 (Two) Times a Day.      • triamterene-hydrochlorothiazide (MAXZIDE-25) 37.5-25 MG per tablet Take 1 tablet by mouth Daily.      , Scheduled Meds:    heparin (porcine) 5,000 Units Subcutaneous Q8H   insulin regular 0-7 Units Subcutaneous Q6H   midodrine 10 mg Oral Q8H   piperacillin-tazobactam 4.5 g Intravenous Q12H   , Continuous Infusions:    norepinephrine 0.02-0.3 mcg/kg/min Last Rate: Stopped (11/09/17 1016)   sodium bicarbonate drip (greater than 75 mEq/bag) 150 mL/hr Last Rate: 150 mL/hr (11/09/17 0441)   , PRN Meds:  •  acetaminophen **OR** acetaminophen  •  bisacodyl  •  dextrose  •  dextrose  •  ipratropium-albuterol  •  ondansetron  •  phenol and Allergies:   Niacin and related    Review of Systems  Pertinent items are noted in HPI    Objective     Vital Signs  Temp:  [97.3 °F (36.3 °C)-98.1 °F (36.7 °C)] 98.1 °F (36.7 °C)  Heart Rate:  [] 68  Resp:  [18-24] 20  BP: ()/() 98/55    I/O this shift:  In: 420.5 [I.V.:320.5; IV Piggyback:100]  Out: 145 [Urine:145]  I/O last 3 completed shifts:  In: 1417.1 [I.V.:1317.1; IV Piggyback:100]  Out: 530 [Urine:530]    Physical Exam:     General Appearance:    Alert, cooperative, in no acute distress   Head:    Normocephalic, without obvious abnormality, atraumatic   Eyes:            Lids and lashes normal, conjunctivae and sclerae normal, no   icterus, no pallor, corneas clear, PERRLA   Ears:    Ears appear intact with no abnormalities noted   Throat:   No oral lesions, no thrush, oral mucosa moist   Neck:   No adenopathy, supple, trachea midline, no thyromegaly, no     carotid bruit, no JVD       Lungs:     Clear to auscultation,respirations regular, even and                   unlabored    Heart:    Regular rhythm and normal rate, normal S1 and S2, no            murmur, no gallop, no rub, no click       Abdomen:     Normal bowel sounds, no masses, no organomegaly, soft        non-tender, non-distended, no guarding, no rebound                 tenderness   Genitalia:    Deferred   Extremities:   Moves all extremities well, no edema, no cyanosis, no              redness   Pulses:   Pulses palpable and equal bilaterally   Skin:   No bleeding, bruising or rash   Lymph nodes:   No palpable adenopathy   Neurologic:   Cranial nerves 2 - 12 grossly intact, sensation intact, DTR        present and equal bilaterally         Results Review:   I reviewed the patient's new clinical results.    WBC WBC   Date Value Ref Range Status   11/09/2017 12.06 (H) 3.50 - 10.80 10*3/mm3 Final   11/08/2017 14.35 (H) 3.50 - 10.80 10*3/mm3 Final      HGB Hemoglobin   Date Value Ref Range Status   11/09/2017 10.2 (L) 11.5 - 15.5 g/dL Final    11/08/2017 12.6 11.5 - 15.5 g/dL Final      HCT Hematocrit   Date Value Ref Range Status   11/09/2017 29.6 (L) 34.5 - 44.0 % Final   11/08/2017 36.3 34.5 - 44.0 % Final      Platlets No results found for: LABPLAT   MCV MCV   Date Value Ref Range Status   11/09/2017 93.7 80.0 - 99.0 fL Final   11/08/2017 95.0 80.0 - 99.0 fL Final          Sodium Sodium   Date Value Ref Range Status   11/09/2017 136 132 - 146 mmol/L Final   11/08/2017 136 132 - 146 mmol/L Final      Potassium Potassium   Date Value Ref Range Status   11/09/2017 3.5 3.5 - 5.5 mmol/L Final   11/08/2017 3.6 3.5 - 5.5 mmol/L Final      Chloride Chloride   Date Value Ref Range Status   11/09/2017 102 99 - 109 mmol/L Final   11/08/2017 105 99 - 109 mmol/L Final      CO2 CO2   Date Value Ref Range Status   11/09/2017 22.0 20.0 - 31.0 mmol/L Final   11/08/2017 10.0 (L) 20.0 - 31.0 mmol/L Final      BUN BUN   Date Value Ref Range Status   11/09/2017 70 (H) 9 - 23 mg/dL Final   11/08/2017 68 (H) 9 - 23 mg/dL Final      Creatinine Creatinine   Date Value Ref Range Status   11/09/2017 4.60 (H) 0.60 - 1.30 mg/dL Final   11/08/2017 4.50 (H) 0.60 - 1.30 mg/dL Final      Calcium Calcium   Date Value Ref Range Status   11/09/2017 6.7 (L) 8.7 - 10.4 mg/dL Final   11/08/2017 7.1 (L) 8.7 - 10.4 mg/dL Final      PO4 No results found for: CAPO4   Albumin Albumin   Date Value Ref Range Status   11/09/2017 2.50 (L) 3.20 - 4.80 g/dL Final      Magnesium Magnesium   Date Value Ref Range Status   11/09/2017 1.9 1.3 - 2.7 mg/dL Final   11/08/2017 1.9 1.3 - 2.7 mg/dL Final      Uric Acid No results found for: URICACID           heparin (porcine) 5,000 Units Subcutaneous Q8H   insulin regular 0-7 Units Subcutaneous Q6H   midodrine 10 mg Oral Q8H   piperacillin-tazobactam 4.5 g Intravenous Q12H       norepinephrine 0.02-0.3 mcg/kg/min Last Rate: Stopped (11/09/17 1016)   sodium bicarbonate drip (greater than 75 mEq/bag) 150 mL/hr Last Rate: 150 mL/hr (11/09/17 0491)        Assessment/Plan     Principal Problem:    Sepsis  Active Problems:    Acute renal failure (ARF)      1- ELLE - Non Oliguric- Multifactorial - Hypotension, HCTZ, ALeve and diarrhea. Pre-renal azotemia  2- Metabolic acidosis - improved with bicarb drip.   3- Hypotension   4- Hypocalcemia - Corrected for albumin 7.9  5- Hypoalbuminemia   6- Proteinuria -P/C ratio 2.6   7- UTI     Plan:  - Will switch to Normal saline @ 125 cc/hr.   - Monitor I/O  - Avoid nephrotoxic agents-(nsaids-aleve)   - Renal diet   - Adjust med per renal function   - Antibx per primary team.   - Iron profile.   - renal ultrasound  - no emergent need of dialysis.   - Obtain records from PCP- Scr     I discussed the patients findings and my recommendations with patient, family and nursing staff    Luke Okeefe MD  11/09/17  @NOW

## 2017-11-09 NOTE — PLAN OF CARE
Problem: Patient Care Overview (Adult)  Goal: Plan of Care Review  Outcome: Ongoing (interventions implemented as appropriate)  Goal: Discharge Needs Assessment  Outcome: Ongoing (interventions implemented as appropriate)    Problem: Renal Failure/Kidney Injury, Acute (Adult)  Goal: Signs and Symptoms of Listed Potential Problems Will be Absent or Manageable (Renal Failure/Kidney Injury, Acute)  Outcome: Ongoing (interventions implemented as appropriate)    Problem: Sepsis (Adult)  Goal: Signs and Symptoms of Listed Potential Problems Will be Absent or Manageable (Sepsis)  Outcome: Ongoing (interventions implemented as appropriate)

## 2017-11-09 NOTE — PROGRESS NOTES
"Adult Nutrition  Assessment/PES    Patient Name:  Jaclyn Garcia  YOB: 1934  MRN: [id number]  Admit Date:  11/8/2017    Assessment Date:  11/9/2017    Comments: RD initial nutrition assessment complete. Family reports that pt has had a poor appetite x1 week, with 5 lb unintentional wt loss. Food preferences noted and sent to diet office. Will order Boost Glucose Control supplements 3x/day. Will continue to follow and adjust nutrition regimen as medically appropriate/per renal function/per PO intake.           Reason for Assessment       11/09/17 1105    Reason for Assessment    Reason For Assessment/Visit multidisciplinary rounds;identified at risk by screening criteria    Time Spent (min) 45    Diagnosis Diagnosis    Cardiac CHF    Infectious Disease Sepsis;UTI   e. coli urosepsis    Renal ELLE   nephrology consult pending    Other diagnosis Pt admitted at outside hospital on (11/6) with watery diarrhea, confusion, ELLE, and shock; transferred to [facility] on (11/8)    Principal Problem:    Sepsis  Active Problems:    Acute renal failure (ARF)                    Nutrition/Diet History       11/09/17 1110    Nutrition/Diet History    Reported/Observed By Patient;Family    Appetite Poor   family reports pt with poor appetite x1 week    Mental State/Condition --   Pt is alert but drowsy    Reported GI Symptoms Diarrhea   pt states that she had a loose bowel movement this AM    Other Pt reports that nothing sounds good to eat right now. Family reported food preferences that pt typically likes to eat/drink- waffles, hot tea, sweet iced tea, caffeine free Pepsi, cottage cheese and peaches            Anthropometrics       11/09/17 1113    Anthropometrics (Special Considerations)    Height Used for Calculations 1.6 m (5' 3\")    Weight Used for Calculations 75.3 kg (166 lb)   per bedscale on (11/8)    RD Calculated BMI (kg/m2) 29.4    Usual Body Weight (UBW)    Weight Loss 2.268 kg (5 lb)    Weight Loss Time Frame 1 " "week            Labs/Tests/Procedures/Meds       11/09/17 1113    Labs/Tests/Procedures/Meds    Labs/Tests Review Reviewed;BUN;Creat;Phos;BNP    Medication Review Reviewed, pertinent;Pressors   levophed @ 0.04 mcg/kg/min= 3 mcg/min     Results from last 7 days  Lab Units 11/09/17  0402   SODIUM mmol/L 136   POTASSIUM mmol/L 3.5   CHLORIDE mmol/L 102   CO2 mmol/L 22.0   BUN mg/dL 70*   CREATININE mg/dL 4.60*   GLUCOSE mg/dL 112*   CALCIUM mg/dL 6.7*   PHOSPHORUS mg/dL 5.3*                Estimated/Assessed Needs       11/09/17 1114    Calculation Measurements    Weight Used For Calculations 75.3 kg (166 lb)    Height Used for Calculations 1.6 m (5' 3\")    Estimated/Assessed Energy Needs    kcal/kg 20   20-25 kcal/kg= 6342-2493 kcal/day    20 Kcal/Kg (kcal) 1505.94    Age 83    RMR ([place]-St. Jeor Equation) 1177.1    Estimated/Assessed Protein Needs    Weight Used for Protein Calculation 75.3 kg (166 lb)    Protein (gm/kg) 1.0   1.0-1.2 g/kg= 75-90 g pro/day    1.0 Gm Protein (gm) 75.3            Nutrition Prescription Ordered       11/09/17 1115    Nutrition Prescription PO    Current PO Diet Regular    Common Modifiers Renal            Evaluation of Received Nutrient/Fluid Intake       11/09/17 1115    PO Evaluation    Number of Days PO Intake Evaluated Insufficient Data            Problem/Interventions:        Problem 1       11/09/17 1116    Nutrition Diagnoses Problem 1    Problem 1 Inadequate Nutrient Intake    Etiology (related to) --   clinical condition, poor appetite    Signs/Symptoms (evidenced by) Report of Mnimal PO Intake;Unintended Weight Change    Unintended Weight Change Loss    Number of Pounds Lost 5    Weight loss time period 1 week                    Intervention Goal       11/09/17 1116    Intervention Goal    General Nutrition support treatment    PO Establish PO            Nutrition Intervention       11/09/17 1116    Nutrition Intervention    RD/Tech Action Care plan reviewd;Encourage " intake;Recommend/ordered;Supplement provided;Follow Tx progress;Menu provided;Menu adjusted;Interview for preference    Recommended/Ordered Supplement            Nutrition Prescription       11/09/17 1116    Nutrition Prescription PO    PO Prescription Begin/change supplement    Supplement Boost Glucose Control   chocolate. Will order Boost Glucose Control at this time as it is lower phos than Boost Plus and Ensure HP    Supplement Frequency 3 times a day    New PO Prescription Ordered? Yes    Other Orders    Other will continue to follow and adjust nutrition regimen as medically appropriate/per renal function/per PO intake            Education/Evaluation       11/09/17 1118    Monitor/Evaluation    Monitor Per protocol;PO intake;Supplement intake;Pertinent labs;Symptoms;Weight        Electronically signed by:  Jennifer Prabhakar MS RD/[facility]  11/09/17 11:19 AM

## 2017-11-09 NOTE — PLAN OF CARE
Problem: Sepsis (Adult)  Goal: Signs and Symptoms of Listed Potential Problems Will be Absent or Manageable (Sepsis)  Outcome: Ongoing (interventions implemented as appropriate)    Problem: Pressure Ulcer Risk (Tal Scale) (Adult,Obstetrics,Pediatric)  Goal: Identify Related Risk Factors and Signs and Symptoms  Outcome: Ongoing (interventions implemented as appropriate)  Goal: Skin Integrity  Outcome: Ongoing (interventions implemented as appropriate)

## 2017-11-09 NOTE — H&P
INTENSIVIST / PULMONARY INITIAL VISIT (CONSULT / H&P) NOTE     Hospital:  LOS: 0 days   Ms. Jaclyn Garcia, 83 y.o. female is followed for:   No chief complaint on file.    Principal Problem:    Sepsis  Active Problems:    Acute renal failure (ARF)         History of Present Illness   82 y/o WF w/ h/o HTN, HLD, GERD, diuretic use (Triamterene/HCTZ), lifetime non-smoker, who presented to [facility] [place] 11/6 after having several days of profuse watery diarrhea.  She also had weakness, decreased po intake, and continued to take her diuretics for HTN.  She also reported taking aleve but couldn't tell me when.  She was found to have E. Coli UTI and GNR bacteremia.  She was felt to be in septic shock and was treated with pip-tazo.  She also received vancomycin at one point.  She was given IVF, and apparently today had to be transferred to ICU on bipap with dopamine gtt and bumex gtt.  We were called to accept patient for possible CRRT.  Note all history basically comes from the chart which is very sparse, nor did I receive a direct report.  Patient is confused and can give a limited history.    Notable studies performed at [facility] showed:    No fevers during stay, multiple low BP's documented HD#1    Labs:  WBC 14.3, Bicarb 15, k 4.0, BUN 53, Cr 4.7, ABG 7.221/31.7/84 BE -15 on 3 lpm, Cortisol 15.8, Ur Cr 87.3, Ur Prot 289, UA Large blood, neg nit, large LE, > 100 WBC, > 100 RBC    Imaging:  Renal U/S - no hydro, echogenic right kidney 12.0 cm renal cortex c/w intrinsic medical renal disease, right 2.1 cm uncomplicated cyst.  Left kidney normal, 10.7 cm.    CXR: LLL infiltrate, pulmonary edema    Echo:  EF 60-65%, no WMA, Grade 3 Diastolic Dysfunction, PASP 50, valves normal, no effusion    CT Abdomen:  Left lower lobe atelectasis vs pneumonia as well as perinephric stranding.  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     RUQ U/S - Distended GB  w/ sludge, no stones or biliary dilatation      No past medical history on file.  No past surgical history on file.  No family history on file.  Social History     Social History   • Marital status:      Spouse name: N/A   • Number of children: N/A   • Years of education: N/A     Occupational History   • Not on file.     Social History Main Topics   • Smoking status: Not on file   • Smokeless tobacco: Not on file   • Alcohol use Not on file   • Drug use: Not on file   • Sexual activity: Not on file     Other Topics Concern   • Not on file     Social History Narrative     Allergies   Allergen Reactions   • Niacin And Related      No current facility-administered medications on file prior to encounter.      No current outpatient prescriptions on file prior to encounter.       ROS:  Per HPI, all other systems were reviewed and were negative        OBJECTIVE     Vitals:    11/08/17 2020   BP: 99/71   Pulse: 86   Temp:    SpO2: 100%     General Appearance:  Conversant, in no acute distress  Eyes:  No scleral icterus or pallor, PERRLA  Ears, Nose, Mouth, Throat:  Atraumatic, oropharynx clear  Neck:  Trachea midline, thyroid normal  Respiratory:  Clear to auscultation bilaterally, normal effort, no tenderness to palpation  Cardiovascular:  Regular rate and rhythm, no murmurs, no peripheral edema, no thrill  Gastrointestinal:  Mildly firm, non-tender, non-distended, no hepatosplenomegaly  Skin:  Normal temperature, no rash  Psychiatric:  Mildly confused, able to answer most questions appropriately  Neuro:  No new focal neurologic deficits observed    Relevant imaging studies and labs from 11/08/17 were reviewed and interpreted by me    Assessment/Plan   IMPRESSION / PLAN     Inpatient Problem List:  83 y.o.female:  Principal Problem:    Sepsis  Active Problems:    Acute renal failure (ARF)       Impression:  83 y.o.female with relevant PMH of HLD, HTN (on triamterene/HCTZ) admitted to [facility] 11/6 w/ several days of  profuse watery diarrhea, confusion, ELLE, and shock.  Overall c/w gastroenteritis that developed into UTI and Urosepsis with E. Coli in Urine and GNR bacteremia (culture pending).  ELLE complicated by Diastolic HF, iatrogenic fluid overload, continued diuretic use despite volume depletion from diarrhea,  And septic shock with probably some component of ATN.    Plan:  -ELLE - consult nephrology, bedside ultrasound to assess volume status.  I was not able to visualize IVC but her RIJ completely collapsed with inspiration suggesting continued volume depletion.  Will restart bicarb gtt, hold diuresis.  Nephrology consult.  May end up needing Dialysis.  New omer placed.    -Diastolic HF Grade 3 - may complicate fluid resuscitation, monitor for HTN / pulmonary edema / etc    -E. Coli Urosepsis / GNR Bacteremia - no sensitivities, no risk factors for ESBL.  Continue Pip-Tazo, check vanco level, d/c vancomycin, re-check blood cultures.  On very low dose of levophed, will start po midodrine and try to wean it off overnight.  Cortisol at OSH normal.    -follow up labs    -SQ Heparin, SCD's    -Renal Diet    Critical care time: 60 min       Finesse Yost MD  Intensive Care Medicine  11/08/17 9:04 PM

## 2017-11-09 NOTE — PROGRESS NOTES
"Intensive Care Follow-up     Hospital:  LOS: 1 day   Ms. Jaclyn Garcia, 83 y.o. female is followed for:   Sepsis        Subjective   Interval History:  The chart has been reviewed in full. Of reviewed all the data. The patient is much more alert today and by report. She states that she is feeling a bit better though unfortunately is feeling an achy feeling all over her body. She denies specifically chest pain however. Her shortness of breath has improved.    The patient's relevant past medical, surgical and social history were reviewed and updated in Epic as appropriate.        Objective     Infusions:    norepinephrine 0.02-0.3 mcg/kg/min Last Rate: Stopped (11/09/17 1016)   sodium bicarbonate drip (greater than 75 mEq/bag) 150 mL/hr Last Rate: 150 mL/hr (11/09/17 0441)     Medications:    heparin (porcine) 5,000 Units Subcutaneous Q8H   insulin regular 0-7 Units Subcutaneous Q6H   midodrine 10 mg Oral Q8H   piperacillin-tazobactam 4.5 g Intravenous Q12H       Vital Sign Min/Max for last 24 hours  Temp  Min: 97.3 °F (36.3 °C)  Max: 98.1 °F (36.7 °C)   BP  Min: 83/59  Max: 131/109   Pulse  Min: 57  Max: 101   Resp  Min: 18  Max: 24   SpO2  Min: 78 %  Max: 100 %   Flow (L/min)  Min: 2  Max: 3       Input/Output for last 24 hour shift  11/08 0701 - 11/09 0700  In: 1417.1 [I.V.:1317.1]  Out: 530 [Urine:530]      Objective:  General Appearance:  Uncomfortable, ill-appearing and in no acute distress.    Vital signs: (most recent): Blood pressure 98/55, pulse 68, temperature 98.1 °F (36.7 °C), temperature source Core, resp. rate 20, height 63\" (160 cm), weight 166 lb 0.1 oz (75.3 kg), SpO2 94 %.  No fever.    Output: Producing urine.    HEENT: Normal HEENT exam.    Lungs:  Normal respiratory rate and normal effort.  She is not in respiratory distress.  Breath sounds clear to auscultation.  No stridor.  No wheezes, rales, rhonchi or decreased breath sounds.    Heart: Normal rate.  Regular rhythm.  S1 normal and S2 " normal.  No murmur or gallop.   Abdomen: Abdomen is soft and non-distended.  Bowel sounds are normal.   There is no abdominal tenderness.     Extremities: Normal range of motion.  There is no deformity or dependent edema.    Neurological: Patient is alert and oriented to person, place and time.    Pupils:  Pupils are equal, round, and reactive to light.  Pupils are equal.   Skin:  Warm and dry.  No rash.               Results from last 7 days  Lab Units 11/09/17  0402 11/08/17 2055   WBC 10*3/mm3 12.06* 14.35*   HEMOGLOBIN g/dL 10.2* 12.6   PLATELETS 10*3/mm3 166 170       Results from last 7 days  Lab Units 11/09/17  0402 11/08/17 2055   SODIUM mmol/L 136 136   POTASSIUM mmol/L 3.5 3.6   CO2 mmol/L 22.0 10.0*   BUN mg/dL 70* 68*   CREATININE mg/dL 4.60* 4.50*   MAGNESIUM mg/dL 1.9 1.9   PHOSPHORUS mg/dL 5.3* 5.2*   GLUCOSE mg/dL 112* 68*     Estimated Creatinine Clearance: 9 mL/min (by C-G formula based on Cr of 4.6).      Results from last 7 days  Lab Units 11/09/17  0429   PH, ARTERIAL pH units 7.431   PCO2, ARTERIAL mm Hg 34.4*   PO2 ART mm Hg 78.6*       I reviewed the patient's results and images.     Assessment/Plan   Impression      Principal Problem:    Sepsis, probable source is urine  Active Problems:    Acute renal failure (ARF)    UTI (urinary tract infection)    Gastroenteritis       Plan        We have final sensitivities from [facility] when available.  Continue on with Zosyn.  Continue current fluids. At this point she likely does not require bicarbonate infusion however I will leave that up to nephrology will be seeing the patient later today.  We will continue Midrin. I am going to check a cortisol level from her previous labs.  We will wean levo fed as tolerated.  If we need to continue on with levo fed I will attempt to place a central line. Apparently last night she was too dehydrated to even visualize any central veins effectively.  She remains critically ill from  sepsis.    Plan of care and goals reviewed with mulitdisciplinary team at daily rounds.   I discussed the patient's findings and my recommendations with patient, family and nursing staff     Time spent Critical care 36 min (It does not include procedure time).    Abdulaziz León MD, [facility]  Pulmonary and Critical Care Medicine  11/09/17 11:54 AM

## 2017-11-10 LAB
ANION GAP SERPL CALCULATED.3IONS-SCNC: 6 MMOL/L (ref 3–11)
BASOPHILS # BLD AUTO: 0.06 10*3/MM3 (ref 0–0.2)
BASOPHILS NFR BLD AUTO: 0.5 % (ref 0–1)
BUN BLD-MCNC: 61 MG/DL (ref 9–23)
BUN/CREAT SERPL: 16.1 (ref 7–25)
CA-I SERPL ISE-MCNC: 0.88 MMOL/L (ref 1.12–1.32)
CALCIUM SPEC-SCNC: 5.4 MG/DL (ref 8.7–10.4)
CHLORIDE SERPL-SCNC: 112 MMOL/L (ref 99–109)
CO2 SERPL-SCNC: 22 MMOL/L (ref 20–31)
CREAT BLD-MCNC: 3.8 MG/DL (ref 0.6–1.3)
DEPRECATED RDW RBC AUTO: 51.2 FL (ref 37–54)
EOSINOPHIL # BLD AUTO: 0.48 10*3/MM3 (ref 0–0.3)
EOSINOPHIL NFR BLD AUTO: 4.2 % (ref 0–3)
ERYTHROCYTE [DISTWIDTH] IN BLOOD BY AUTOMATED COUNT: 14.9 % (ref 11.3–14.5)
GFR SERPL CREATININE-BSD FRML MDRD: 11 ML/MIN/1.73
GLUCOSE BLD-MCNC: 63 MG/DL (ref 70–100)
GLUCOSE BLDC GLUCOMTR-MCNC: 91 MG/DL (ref 70–130)
GLUCOSE BLDC GLUCOMTR-MCNC: 92 MG/DL (ref 70–130)
GLUCOSE BLDC GLUCOMTR-MCNC: 96 MG/DL (ref 70–130)
GLUCOSE BLDC GLUCOMTR-MCNC: 98 MG/DL (ref 70–130)
HCT VFR BLD AUTO: 25.6 % (ref 34.5–44)
HGB BLD-MCNC: 8.9 G/DL (ref 11.5–15.5)
IMM GRANULOCYTES # BLD: 0.21 10*3/MM3 (ref 0–0.03)
IMM GRANULOCYTES NFR BLD: 1.8 % (ref 0–0.6)
IRON 24H UR-MRATE: 14 MCG/DL (ref 50–175)
IRON SATN MFR SERPL: 10 % (ref 15–50)
LYMPHOCYTES # BLD AUTO: 1.25 10*3/MM3 (ref 0.6–4.8)
LYMPHOCYTES NFR BLD AUTO: 10.8 % (ref 24–44)
MAGNESIUM SERPL-MCNC: 1.5 MG/DL (ref 1.3–2.7)
MCH RBC QN AUTO: 32.5 PG (ref 27–31)
MCHC RBC AUTO-ENTMCNC: 34.8 G/DL (ref 32–36)
MCV RBC AUTO: 93.4 FL (ref 80–99)
MONOCYTES # BLD AUTO: 1.55 10*3/MM3 (ref 0–1)
MONOCYTES NFR BLD AUTO: 13.4 % (ref 0–12)
NEUTROPHILS # BLD AUTO: 7.98 10*3/MM3 (ref 1.5–8.3)
NEUTROPHILS NFR BLD AUTO: 69.3 % (ref 41–71)
PHOSPHATE SERPL-MCNC: 4.5 MG/DL (ref 2.4–5.1)
PLATELET # BLD AUTO: 151 10*3/MM3 (ref 150–450)
PMV BLD AUTO: 11 FL (ref 6–12)
POTASSIUM BLD-SCNC: 3 MMOL/L (ref 3.5–5.5)
POTASSIUM BLD-SCNC: 3.9 MMOL/L (ref 3.5–5.5)
RBC # BLD AUTO: 2.74 10*6/MM3 (ref 3.89–5.14)
SODIUM BLD-SCNC: 140 MMOL/L (ref 132–146)
TIBC SERPL-MCNC: 142 MCG/DL (ref 250–450)
WBC NRBC COR # BLD: 11.53 10*3/MM3 (ref 3.5–10.8)

## 2017-11-10 PROCEDURE — 83550 IRON BINDING TEST: CPT | Performed by: INTERNAL MEDICINE

## 2017-11-10 PROCEDURE — 94760 N-INVAS EAR/PLS OXIMETRY 1: CPT

## 2017-11-10 PROCEDURE — 82330 ASSAY OF CALCIUM: CPT | Performed by: INTERNAL MEDICINE

## 2017-11-10 PROCEDURE — 80048 BASIC METABOLIC PNL TOTAL CA: CPT | Performed by: INTERNAL MEDICINE

## 2017-11-10 PROCEDURE — 94640 AIRWAY INHALATION TREATMENT: CPT

## 2017-11-10 PROCEDURE — 83735 ASSAY OF MAGNESIUM: CPT | Performed by: INTERNAL MEDICINE

## 2017-11-10 PROCEDURE — 84132 ASSAY OF SERUM POTASSIUM: CPT

## 2017-11-10 PROCEDURE — 83540 ASSAY OF IRON: CPT | Performed by: INTERNAL MEDICINE

## 2017-11-10 PROCEDURE — 25010000002 CALCIUM GLUCONATE PER 10 ML: Performed by: NURSE PRACTITIONER

## 2017-11-10 PROCEDURE — 25010000002 NA FERRIC GLUC CPLX PER 12.5 MG: Performed by: INTERNAL MEDICINE

## 2017-11-10 PROCEDURE — 94799 UNLISTED PULMONARY SVC/PX: CPT

## 2017-11-10 PROCEDURE — 25010000002 MAGNESIUM SULFATE 2 GM/50ML SOLUTION: Performed by: INTERNAL MEDICINE

## 2017-11-10 PROCEDURE — 25010000002 HEPARIN (PORCINE) PER 1000 UNITS: Performed by: INTERNAL MEDICINE

## 2017-11-10 PROCEDURE — 85025 COMPLETE CBC W/AUTO DIFF WBC: CPT | Performed by: INTERNAL MEDICINE

## 2017-11-10 PROCEDURE — 84100 ASSAY OF PHOSPHORUS: CPT | Performed by: INTERNAL MEDICINE

## 2017-11-10 PROCEDURE — 25010000002 PIPERACILLIN SOD-TAZOBACTAM PER 1 G: Performed by: NURSE PRACTITIONER

## 2017-11-10 PROCEDURE — 25010000002 MAGNESIUM SULFATE IN D5W 1G/100ML (PREMIX) 1-5 GM/100ML-% SOLUTION: Performed by: INTERNAL MEDICINE

## 2017-11-10 PROCEDURE — 82962 GLUCOSE BLOOD TEST: CPT

## 2017-11-10 PROCEDURE — 99233 SBSQ HOSP IP/OBS HIGH 50: CPT | Performed by: INTERNAL MEDICINE

## 2017-11-10 RX ORDER — POTASSIUM CHLORIDE 750 MG/1
40 CAPSULE, EXTENDED RELEASE ORAL AS NEEDED
Status: DISCONTINUED | OUTPATIENT
Start: 2017-11-10 | End: 2017-11-16 | Stop reason: ALTCHOICE

## 2017-11-10 RX ORDER — POTASSIUM CHLORIDE 1.5 G/1.77G
40 POWDER, FOR SOLUTION ORAL AS NEEDED
Status: DISCONTINUED | OUTPATIENT
Start: 2017-11-10 | End: 2017-11-11

## 2017-11-10 RX ORDER — MAGNESIUM SULFATE HEPTAHYDRATE 40 MG/ML
2 INJECTION, SOLUTION INTRAVENOUS AS NEEDED
Status: DISCONTINUED | OUTPATIENT
Start: 2017-11-10 | End: 2017-11-21 | Stop reason: HOSPADM

## 2017-11-10 RX ORDER — MAGNESIUM SULFATE 1 G/100ML
1 INJECTION INTRAVENOUS AS NEEDED
Status: DISCONTINUED | OUTPATIENT
Start: 2017-11-10 | End: 2017-11-21 | Stop reason: HOSPADM

## 2017-11-10 RX ORDER — CALCIUM GLUCONATE 94 MG/ML
2 INJECTION, SOLUTION INTRAVENOUS ONCE
Status: COMPLETED | OUTPATIENT
Start: 2017-11-10 | End: 2017-11-10

## 2017-11-10 RX ORDER — POTASSIUM CHLORIDE 29.8 MG/ML
20 INJECTION INTRAVENOUS
Status: DISCONTINUED | OUTPATIENT
Start: 2017-11-10 | End: 2017-11-13

## 2017-11-10 RX ORDER — MAGNESIUM SULFATE HEPTAHYDRATE 40 MG/ML
4 INJECTION, SOLUTION INTRAVENOUS AS NEEDED
Status: DISCONTINUED | OUTPATIENT
Start: 2017-11-10 | End: 2017-11-21 | Stop reason: HOSPADM

## 2017-11-10 RX ADMIN — SODIUM CHLORIDE 125 MG: 9 INJECTION, SOLUTION INTRAVENOUS at 12:38

## 2017-11-10 RX ADMIN — MIDODRINE HYDROCHLORIDE 10 MG: 5 TABLET ORAL at 13:40

## 2017-11-10 RX ADMIN — HEPARIN SODIUM 5000 UNITS: 5000 INJECTION, SOLUTION INTRAVENOUS; SUBCUTANEOUS at 21:35

## 2017-11-10 RX ADMIN — ACETAMINOPHEN 650 MG: 325 TABLET ORAL at 06:07

## 2017-11-10 RX ADMIN — MAGNESIUM SULFATE HEPTAHYDRATE 2 G: 40 INJECTION, SOLUTION INTRAVENOUS at 11:29

## 2017-11-10 RX ADMIN — MAGNESIUM SULFATE HEPTAHYDRATE 1 G: 1 INJECTION, SOLUTION INTRAVENOUS at 13:41

## 2017-11-10 RX ADMIN — CALCIUM GLUCONATE 2 G: 94 INJECTION, SOLUTION INTRAVENOUS at 06:33

## 2017-11-10 RX ADMIN — SODIUM CHLORIDE 125 ML/HR: 9 INJECTION, SOLUTION INTRAVENOUS at 11:39

## 2017-11-10 RX ADMIN — IPRATROPIUM BROMIDE AND ALBUTEROL SULFATE 3 ML: .5; 3 SOLUTION RESPIRATORY (INHALATION) at 01:27

## 2017-11-10 RX ADMIN — IPRATROPIUM BROMIDE AND ALBUTEROL SULFATE 3 ML: .5; 3 SOLUTION RESPIRATORY (INHALATION) at 18:36

## 2017-11-10 RX ADMIN — TAZOBACTAM SODIUM AND PIPERACILLIN SODIUM 4.5 G: 500; 4 INJECTION, SOLUTION INTRAVENOUS at 08:21

## 2017-11-10 RX ADMIN — POTASSIUM CHLORIDE 40 MEQ: 750 CAPSULE, EXTENDED RELEASE ORAL at 11:29

## 2017-11-10 RX ADMIN — IPRATROPIUM BROMIDE AND ALBUTEROL SULFATE 3 ML: .5; 3 SOLUTION RESPIRATORY (INHALATION) at 07:56

## 2017-11-10 RX ADMIN — HEPARIN SODIUM 5000 UNITS: 5000 INJECTION, SOLUTION INTRAVENOUS; SUBCUTANEOUS at 06:07

## 2017-11-10 RX ADMIN — MIDODRINE HYDROCHLORIDE 10 MG: 5 TABLET ORAL at 06:07

## 2017-11-10 RX ADMIN — MIDODRINE HYDROCHLORIDE 10 MG: 5 TABLET ORAL at 21:35

## 2017-11-10 RX ADMIN — TAZOBACTAM SODIUM AND PIPERACILLIN SODIUM 4.5 G: 500; 4 INJECTION, SOLUTION INTRAVENOUS at 20:07

## 2017-11-10 RX ADMIN — HEPARIN SODIUM 5000 UNITS: 5000 INJECTION, SOLUTION INTRAVENOUS; SUBCUTANEOUS at 13:33

## 2017-11-10 RX ADMIN — SODIUM CHLORIDE 125 ML/HR: 9 INJECTION, SOLUTION INTRAVENOUS at 19:41

## 2017-11-10 NOTE — PROGRESS NOTES
"   LOS: 2 days    Patient Care Team:  No Known Provider as PCP - General    Reason For Visit:  F/U ELLE  Subjective           Review of Systems:    Pulm: No soa   CV:  No CP      Objective       ferric gluconate (FERRLECIT) IVPB 125 mg Intravenous Daily   heparin (porcine) 5,000 Units Subcutaneous Q8H   insulin regular 0-7 Units Subcutaneous Q6H   ipratropium-albuterol 3 mL Nebulization Q6H - RT   midodrine 10 mg Oral Q8H   piperacillin-tazobactam 4.5 g Intravenous Q12H       norepinephrine 0.02-0.3 mcg/kg/min Last Rate: Stopped (11/09/17 1016)   sodium chloride 125 mL/hr Last Rate: 125 mL/hr (11/09/17 2106)         Vital Signs:  Blood pressure 114/64, pulse 80, temperature 97.3 °F (36.3 °C), temperature source Core, resp. rate 18, height 63\" (160 cm), weight 166 lb 0.1 oz (75.3 kg), SpO2 92 %.    Flowsheet Rows         First Filed Value    Admission Height  63\" (160 cm) Documented at 11/08/2017 2018    Admission Weight  166 lb 0.1 oz (75.3 kg) Documented at 11/08/2017 2018 11/09 0701 - 11/10 0700  In: 3726.1 [I.V.:3526.1]  Out: 740 [Urine:740]    Physical Exam:    General Appearance: NAD, alert and cooperative, Ox3  Eyes: PER, conjunctivae and sclerae normal, no icterus  Lungs: respirations regular and unlabored, no crepitus, clear to auscultation  Heart/CV: regular rhythm & normal rate, no murmur, no gallop, no rub and no edema  Abdomen: not distended, soft, non-tender, no masses,  bowel sounds present  Skin: No rash, Warm and dry    Radiology: RENAL U/S: NO HYDRO.            Labs: FE SAT 10%.    Results from last 7 days  Lab Units 11/10/17  0431 11/09/17  0402 11/08/17  2055   WBC 10*3/mm3 11.53* 12.06* 14.35*   HEMOGLOBIN g/dL 8.9* 10.2* 12.6   HEMATOCRIT % 25.6* 29.6* 36.3   PLATELETS 10*3/mm3 151 166 170       Results from last 7 days  Lab Units 11/10/17  0431 11/09/17  1500 11/09/17  0402 11/08/17  2055   SODIUM mmol/L 140  --  136 136   POTASSIUM mmol/L 3.0* 3.2* 3.5 3.6   CHLORIDE mmol/L 112*  --  " 102 105   CO2 mmol/L 22.0  --  22.0 10.0*   BUN mg/dL 61*  --  70* 68*   CREATININE mg/dL 3.80*  --  4.60* 4.50*   CALCIUM mg/dL 5.4*  --  6.7* 7.1*   PHOSPHORUS mg/dL 4.5  --  5.3* 5.2*   MAGNESIUM mg/dL 1.5  --  1.9 1.9   ALBUMIN g/dL  --   --  2.50*  --        Results from last 7 days  Lab Units 11/10/17  0431   GLUCOSE mg/dL 63*         Results from last 7 days  Lab Units 11/09/17  0402   ALK PHOS U/L 67   BILIRUBIN mg/dL 0.4   ALT (SGPT) U/L 8   AST (SGOT) U/L 17       Results from last 7 days  Lab Units 11/09/17  0429   PH, ARTERIAL pH units 7.431   PO2 ART mm Hg 78.6*   PCO2, ARTERIAL mm Hg 34.4*   HCO3 ART mmol/L 22.9         Results from last 7 days  Lab Units 11/08/17  2131   COLOR UA  Yellow   CLARITY UA  Cloudy*   PH, URINE  6.0   SPECIFIC GRAVITY, URINE  1.009   GLUCOSE UA  Negative   KETONES UA  Negative   BILIRUBIN UA  Negative   PROTEIN UA  30 mg/dL (1+)*   BLOOD UA  Large (3+)*   LEUKOCYTES UA  Moderate (2+)*   NITRITE UA  Negative       Estimated Creatinine Clearance: 10.9 mL/min (by C-G formula based on Cr of 3.8).      Assessment     Principal Problem:    Sepsis, probable source is urine  Active Problems:    Acute renal failure (ARF)    UTI (urinary tract infection)    Gastroenteritis            Impression: ELLE RESOLVING. FROM DIURETICS, NSAIA AND VOLUME DEPLETION. HYPOKALEMIA BEING SUPPLEMENTED. HYPOCALCEMIA IS BEING SUPPLEMENTED. ANEMIA WITH FE DEF.            Recommendations: IVF. IV FE.      Orlando Melvin MD  11/10/17  9:51 AM

## 2017-11-10 NOTE — PLAN OF CARE
Problem: Renal Failure/Kidney Injury, Acute (Adult)  Goal: Signs and Symptoms of Listed Potential Problems Will be Absent or Manageable (Renal Failure/Kidney Injury, Acute)  Outcome: Ongoing (interventions implemented as appropriate)    Problem: Pressure Ulcer Risk (Tal Scale) (Adult,Obstetrics,Pediatric)  Goal: Identify Related Risk Factors and Signs and Symptoms  Outcome: Ongoing (interventions implemented as appropriate)  Goal: Skin Integrity  Outcome: Ongoing (interventions implemented as appropriate)

## 2017-11-10 NOTE — PROGRESS NOTES
"Intensive Care Follow-up     Hospital:  LOS: 2 days   Ms. Jaclyn Garcia, 83 y.o. female is followed for:   Sepsis        Subjective   Interval History:  The chart has been reviewed. Her pressor has been weaned off. She is beginning to make a bit more urine. Cultures are beginning to grow gram-negative rods which is consistent with the results from her other hospitalization. She has remained afebrile and she states that she is feeling much better today.    The patient's relevant past medical, surgical and social history were reviewed and updated in Epic as appropriate.        Objective     Infusions:    norepinephrine 0.02-0.3 mcg/kg/min Last Rate: Stopped (11/09/17 1016)   sodium chloride 125 mL/hr Last Rate: 125 mL/hr (11/09/17 2106)     Medications:    ferric gluconate (FERRLECIT) IVPB 125 mg Intravenous Daily   heparin (porcine) 5,000 Units Subcutaneous Q8H   insulin regular 0-7 Units Subcutaneous Q6H   ipratropium-albuterol 3 mL Nebulization Q6H - RT   midodrine 10 mg Oral Q8H   piperacillin-tazobactam 4.5 g Intravenous Q12H       Vital Sign Min/Max for last 24 hours  Temp  Min: 97.2 °F (36.2 °C)  Max: 98.2 °F (36.8 °C)   BP  Min: 81/55  Max: 144/75   Pulse  Min: 50  Max: 80   Resp  Min: 12  Max: 22   SpO2  Min: 90 %  Max: 100 %   Flow (L/min)  Min: 2  Max: 2       Input/Output for last 24 hour shift  11/09 0701 - 11/10 0700  In: 3726.1 [I.V.:3526.1]  Out: 740 [Urine:740]      Objective:  General Appearance:  In no acute distress, comfortable and well-appearing.    Vital signs: (most recent): Blood pressure 109/75, pulse 69, temperature 97.3 °F (36.3 °C), temperature source Core, resp. rate 18, height 63\" (160 cm), weight 166 lb 0.1 oz (75.3 kg), SpO2 91 %.  No fever.    Output: Producing urine.    HEENT: Normal HEENT exam.    Lungs:  Normal respiratory rate and normal effort.  She is not in respiratory distress.  Breath sounds clear to auscultation.  No stridor.  No wheezes, rales, rhonchi or decreased breath " sounds.    Heart: Normal rate.  Regular rhythm.  S1 normal and S2 normal.  No murmur or gallop.   Abdomen: Abdomen is soft and non-distended.  Bowel sounds are normal.   There is no abdominal tenderness.     Extremities: Normal range of motion.  There is no deformity or dependent edema.    Neurological: Patient is alert and oriented to person, place and time.  Normal strength.    Pupils:  Pupils are equal, round, and reactive to light.  Pupils are equal.   Skin:  Warm and dry.  No rash.               Results from last 7 days  Lab Units 11/10/17  0431 11/09/17  0402 11/08/17 2055   WBC 10*3/mm3 11.53* 12.06* 14.35*   HEMOGLOBIN g/dL 8.9* 10.2* 12.6   PLATELETS 10*3/mm3 151 166 170       Results from last 7 days  Lab Units 11/10/17  0431 11/09/17  1500 11/09/17 0402 11/08/17 2055   SODIUM mmol/L 140  --  136 136   POTASSIUM mmol/L 3.0* 3.2* 3.5 3.6   CO2 mmol/L 22.0  --  22.0 10.0*   BUN mg/dL 61*  --  70* 68*   CREATININE mg/dL 3.80*  --  4.60* 4.50*   MAGNESIUM mg/dL 1.5  --  1.9 1.9   PHOSPHORUS mg/dL 4.5  --  5.3* 5.2*   GLUCOSE mg/dL 63*  --  112* 68*     Estimated Creatinine Clearance: 10.9 mL/min (by C-G formula based on Cr of 3.8).      Results from last 7 days  Lab Units 11/09/17  0429   PH, ARTERIAL pH units 7.431   PCO2, ARTERIAL mm Hg 34.4*   PO2 ART mm Hg 78.6*       I reviewed the patient's results and images.     Assessment/Plan   Impression      Principal Problem:    Sepsis, probable source is urine  Active Problems:    Acute renal failure (ARF)    UTI (urinary tract infection)    Gastroenteritis       Plan        Replace electrolytes today.  We will continue with IV fluid repletion.  I suspect that she will continue to lose some volume from her diarrhea though this seems to be clearing up.  Continue current antibiotics.  Physical therapy has been consulted.  She will remain in the intensive care unit today for close monitoring.  Follow-up labs have been ordered.    Plan of care and goals reviewed  with mulitdisciplinary team at daily rounds.   I discussed the patient's findings and my recommendations with patient, family and nursing staff         Abdulaziz León MD, [facility]  Pulmonary and Critical Care Medicine  11/10/17 11:25 AM

## 2017-11-10 NOTE — PROGRESS NOTES
Multidisciplinary Rounds    Time: 20min  Patient Name: Jaclyn Garcia  Date of Encounter: 11/10/17 5:32 PM  MRN: [id number]  Admission date: 11/8/2017      Reason for visit: MDR. RD to continue to follow per protocol.     Pt sitting up in bed, has large number of family visiting at present,  pt reports no appetite, dislikes thickness of boost supplements    Will change to Resource Breeze 3x/day, ELLE improving, diet changed to Regular to encourage po intake    Current diet: Diet Regular      Intervention:  Follow treatment plan  Care plan reviewed    Follow up:   Per protocol      Patricia Santa RD  5:32 PM

## 2017-11-10 NOTE — PLAN OF CARE
Problem: Patient Care Overview (Adult)  Goal: Plan of Care Review  Outcome: Ongoing (interventions implemented as appropriate)  Goal: Discharge Needs Assessment  Outcome: Ongoing (interventions implemented as appropriate)    Problem: Renal Failure/Kidney Injury, Acute (Adult)  Goal: Signs and Symptoms of Listed Potential Problems Will be Absent or Manageable (Renal Failure/Kidney Injury, Acute)  Outcome: Ongoing (interventions implemented as appropriate)    Problem: Sepsis (Adult)  Goal: Signs and Symptoms of Listed Potential Problems Will be Absent or Manageable (Sepsis)  Outcome: Ongoing (interventions implemented as appropriate)    Problem: Pressure Ulcer Risk (Tal Scale) (Adult,Obstetrics,Pediatric)  Goal: Identify Related Risk Factors and Signs and Symptoms  Outcome: Ongoing (interventions implemented as appropriate)  Goal: Skin Integrity  Outcome: Ongoing (interventions implemented as appropriate)

## 2017-11-10 NOTE — PROGRESS NOTES
Continued Stay Note   [place]     Patient Name: Jaclyn Garcia  MRN: [id number]  Today's Date: 11/10/2017    Admit Date: 11/8/2017          Discharge Plan       11/10/17 0946    Case Management/Social Work Plan    Plan Rehab    Patient/Family In Agreement With Plan yes    Additional Comments PT/OT ordered.  Goal is home, but may need rehab prior to d/c home.  CM will follow.              Discharge Codes     None            Aung Watson RN

## 2017-11-11 ENCOUNTER — APPOINTMENT (OUTPATIENT)
Dept: GENERAL RADIOLOGY | Facility: HOSPITAL | Age: 82
End: 2017-11-11

## 2017-11-11 LAB
ALBUMIN SERPL-MCNC: 2.7 G/DL (ref 3.2–4.8)
ANION GAP SERPL CALCULATED.3IONS-SCNC: 13 MMOL/L (ref 3–11)
BACTERIA SPEC AEROBE CULT: ABNORMAL
BACTERIA SPEC AEROBE CULT: ABNORMAL
BASOPHILS # BLD AUTO: 0.12 10*3/MM3 (ref 0–0.2)
BASOPHILS NFR BLD AUTO: 0.9 % (ref 0–1)
BUN BLD-MCNC: 61 MG/DL (ref 9–23)
BUN/CREAT SERPL: 13 (ref 7–25)
CA-I SERPL ISE-MCNC: 1.11 MMOL/L (ref 1.12–1.32)
CALCIUM SPEC-SCNC: 7.7 MG/DL (ref 8.7–10.4)
CHLORIDE SERPL-SCNC: 102 MMOL/L (ref 99–109)
CO2 SERPL-SCNC: 21 MMOL/L (ref 20–31)
CREAT BLD-MCNC: 4.7 MG/DL (ref 0.6–1.3)
CREAT UR-MCNC: 40.2 MG/DL
DEPRECATED RDW RBC AUTO: 53.5 FL (ref 37–54)
EOSINOPHIL # BLD AUTO: 0.6 10*3/MM3 (ref 0–0.3)
EOSINOPHIL NFR BLD AUTO: 4.5 % (ref 0–3)
EOSINOPHIL SPEC QL MICRO: 0 % EOS/100 CELLS (ref 0–0)
ERYTHROCYTE [DISTWIDTH] IN BLOOD BY AUTOMATED COUNT: 15.2 % (ref 11.3–14.5)
GFR SERPL CREATININE-BSD FRML MDRD: 9 ML/MIN/1.73
GLUCOSE BLD-MCNC: 81 MG/DL (ref 70–100)
GLUCOSE BLDC GLUCOMTR-MCNC: 80 MG/DL (ref 70–130)
HCT VFR BLD AUTO: 31 % (ref 34.5–44)
HGB BLD-MCNC: 10.2 G/DL (ref 11.5–15.5)
IMM GRANULOCYTES # BLD: 0.58 10*3/MM3 (ref 0–0.03)
IMM GRANULOCYTES NFR BLD: 4.3 % (ref 0–0.6)
LYMPHOCYTES # BLD AUTO: 1.92 10*3/MM3 (ref 0.6–4.8)
LYMPHOCYTES NFR BLD AUTO: 14.4 % (ref 24–44)
MAGNESIUM SERPL-MCNC: 2.7 MG/DL (ref 1.3–2.7)
MCH RBC QN AUTO: 31.5 PG (ref 27–31)
MCHC RBC AUTO-ENTMCNC: 32.9 G/DL (ref 32–36)
MCV RBC AUTO: 95.7 FL (ref 80–99)
MONOCYTES # BLD AUTO: 1.48 10*3/MM3 (ref 0–1)
MONOCYTES NFR BLD AUTO: 11.1 % (ref 0–12)
NEUTROPHILS # BLD AUTO: 8.66 10*3/MM3 (ref 1.5–8.3)
NEUTROPHILS NFR BLD AUTO: 64.8 % (ref 41–71)
PHOSPHATE SERPL-MCNC: 6.2 MG/DL (ref 2.4–5.1)
PLAT MORPH BLD: NORMAL
PLATELET # BLD AUTO: 207 10*3/MM3 (ref 150–450)
PMV BLD AUTO: 11 FL (ref 6–12)
POTASSIUM BLD-SCNC: 3.9 MMOL/L (ref 3.5–5.5)
PROT UR-MCNC: 35 MG/DL (ref 1–14)
RBC # BLD AUTO: 3.24 10*6/MM3 (ref 3.89–5.14)
RBC MORPH BLD: NORMAL
SODIUM BLD-SCNC: 136 MMOL/L (ref 132–146)
SODIUM UR-SCNC: 63 MMOL/L (ref 30–90)
WBC MORPH BLD: NORMAL
WBC NRBC COR # BLD: 13.36 10*3/MM3 (ref 3.5–10.8)

## 2017-11-11 PROCEDURE — 84156 ASSAY OF PROTEIN URINE: CPT | Performed by: INTERNAL MEDICINE

## 2017-11-11 PROCEDURE — P9041 ALBUMIN (HUMAN),5%, 50ML: HCPCS | Performed by: INTERNAL MEDICINE

## 2017-11-11 PROCEDURE — 82962 GLUCOSE BLOOD TEST: CPT

## 2017-11-11 PROCEDURE — 85025 COMPLETE CBC W/AUTO DIFF WBC: CPT | Performed by: INTERNAL MEDICINE

## 2017-11-11 PROCEDURE — 99291 CRITICAL CARE FIRST HOUR: CPT | Performed by: INTERNAL MEDICINE

## 2017-11-11 PROCEDURE — 80069 RENAL FUNCTION PANEL: CPT | Performed by: INTERNAL MEDICINE

## 2017-11-11 PROCEDURE — 94799 UNLISTED PULMONARY SVC/PX: CPT

## 2017-11-11 PROCEDURE — 84300 ASSAY OF URINE SODIUM: CPT | Performed by: INTERNAL MEDICINE

## 2017-11-11 PROCEDURE — 25010000002 PIPERACILLIN SOD-TAZOBACTAM PER 1 G: Performed by: NURSE PRACTITIONER

## 2017-11-11 PROCEDURE — 25010000002 NA FERRIC GLUC CPLX PER 12.5 MG: Performed by: INTERNAL MEDICINE

## 2017-11-11 PROCEDURE — 94760 N-INVAS EAR/PLS OXIMETRY 1: CPT

## 2017-11-11 PROCEDURE — 5A09457 ASSISTANCE WITH RESPIRATORY VENTILATION, 24-96 CONSECUTIVE HOURS, CONTINUOUS POSITIVE AIRWAY PRESSURE: ICD-10-PCS | Performed by: INTERNAL MEDICINE

## 2017-11-11 PROCEDURE — 25010000002 HEPARIN (PORCINE) PER 1000 UNITS: Performed by: INTERNAL MEDICINE

## 2017-11-11 PROCEDURE — 71010 HC CHEST PA OR AP: CPT

## 2017-11-11 PROCEDURE — 83735 ASSAY OF MAGNESIUM: CPT | Performed by: INTERNAL MEDICINE

## 2017-11-11 PROCEDURE — 82570 ASSAY OF URINE CREATININE: CPT | Performed by: INTERNAL MEDICINE

## 2017-11-11 PROCEDURE — 25010000002 CEFTRIAXONE PER 250 MG: Performed by: INTERNAL MEDICINE

## 2017-11-11 PROCEDURE — 87205 SMEAR GRAM STAIN: CPT | Performed by: INTERNAL MEDICINE

## 2017-11-11 PROCEDURE — 25010000002 ALBUMIN HUMAN 5% PER 50 ML: Performed by: INTERNAL MEDICINE

## 2017-11-11 PROCEDURE — 94640 AIRWAY INHALATION TREATMENT: CPT

## 2017-11-11 PROCEDURE — 85007 BL SMEAR W/DIFF WBC COUNT: CPT | Performed by: INTERNAL MEDICINE

## 2017-11-11 PROCEDURE — 82330 ASSAY OF CALCIUM: CPT | Performed by: INTERNAL MEDICINE

## 2017-11-11 RX ORDER — ALBUMIN, HUMAN INJ 5% 5 %
25 SOLUTION INTRAVENOUS 3 TIMES DAILY
Status: COMPLETED | OUTPATIENT
Start: 2017-11-11 | End: 2017-11-11

## 2017-11-11 RX ORDER — CEFTRIAXONE SODIUM 1 G/50ML
1 INJECTION, SOLUTION INTRAVENOUS EVERY 24 HOURS
Status: COMPLETED | OUTPATIENT
Start: 2017-11-11 | End: 2017-11-21

## 2017-11-11 RX ORDER — IPRATROPIUM BROMIDE AND ALBUTEROL SULFATE 2.5; .5 MG/3ML; MG/3ML
3 SOLUTION RESPIRATORY (INHALATION)
Status: DISCONTINUED | OUTPATIENT
Start: 2017-11-11 | End: 2017-11-15

## 2017-11-11 RX ORDER — FAMOTIDINE 20 MG/1
20 TABLET, FILM COATED ORAL DAILY
Status: DISCONTINUED | OUTPATIENT
Start: 2017-11-11 | End: 2017-11-17

## 2017-11-11 RX ORDER — IPRATROPIUM BROMIDE AND ALBUTEROL SULFATE 2.5; .5 MG/3ML; MG/3ML
3 SOLUTION RESPIRATORY (INHALATION) EVERY 4 HOURS PRN
Status: DISCONTINUED | OUTPATIENT
Start: 2017-11-11 | End: 2017-11-17 | Stop reason: DRUGHIGH

## 2017-11-11 RX ADMIN — ALBUMIN HUMAN 25 G: 0.05 INJECTION, SOLUTION INTRAVENOUS at 20:40

## 2017-11-11 RX ADMIN — TAZOBACTAM SODIUM AND PIPERACILLIN SODIUM 4.5 G: 500; 4 INJECTION, SOLUTION INTRAVENOUS at 08:47

## 2017-11-11 RX ADMIN — MIDODRINE HYDROCHLORIDE 10 MG: 5 TABLET ORAL at 14:21

## 2017-11-11 RX ADMIN — FAMOTIDINE 20 MG: 20 TABLET, FILM COATED ORAL at 14:26

## 2017-11-11 RX ADMIN — MIDODRINE HYDROCHLORIDE 10 MG: 5 TABLET ORAL at 21:04

## 2017-11-11 RX ADMIN — ALBUMIN HUMAN 25 G: 0.05 INJECTION, SOLUTION INTRAVENOUS at 15:42

## 2017-11-11 RX ADMIN — IPRATROPIUM BROMIDE AND ALBUTEROL SULFATE 3 ML: .5; 3 SOLUTION RESPIRATORY (INHALATION) at 10:38

## 2017-11-11 RX ADMIN — MIDODRINE HYDROCHLORIDE 10 MG: 5 TABLET ORAL at 05:46

## 2017-11-11 RX ADMIN — IPRATROPIUM BROMIDE AND ALBUTEROL SULFATE 3 ML: .5; 3 SOLUTION RESPIRATORY (INHALATION) at 06:30

## 2017-11-11 RX ADMIN — IPRATROPIUM BROMIDE AND ALBUTEROL SULFATE 3 ML: .5; 3 SOLUTION RESPIRATORY (INHALATION) at 17:25

## 2017-11-11 RX ADMIN — IPRATROPIUM BROMIDE AND ALBUTEROL SULFATE 3 ML: .5; 3 SOLUTION RESPIRATORY (INHALATION) at 12:27

## 2017-11-11 RX ADMIN — HEPARIN SODIUM 5000 UNITS: 5000 INJECTION, SOLUTION INTRAVENOUS; SUBCUTANEOUS at 21:04

## 2017-11-11 RX ADMIN — IPRATROPIUM BROMIDE AND ALBUTEROL SULFATE 3 ML: .5; 3 SOLUTION RESPIRATORY (INHALATION) at 20:05

## 2017-11-11 RX ADMIN — CEFTRIAXONE SODIUM 1 G: 1 INJECTION, SOLUTION INTRAVENOUS at 14:21

## 2017-11-11 RX ADMIN — SODIUM CHLORIDE 125 MG: 9 INJECTION, SOLUTION INTRAVENOUS at 08:47

## 2017-11-11 RX ADMIN — IPRATROPIUM BROMIDE AND ALBUTEROL SULFATE 3 ML: .5; 3 SOLUTION RESPIRATORY (INHALATION) at 01:29

## 2017-11-11 RX ADMIN — HEPARIN SODIUM 5000 UNITS: 5000 INJECTION, SOLUTION INTRAVENOUS; SUBCUTANEOUS at 14:21

## 2017-11-11 RX ADMIN — ALBUMIN HUMAN 25 G: 0.05 INJECTION, SOLUTION INTRAVENOUS at 10:30

## 2017-11-11 RX ADMIN — HEPARIN SODIUM 5000 UNITS: 5000 INJECTION, SOLUTION INTRAVENOUS; SUBCUTANEOUS at 05:46

## 2017-11-11 NOTE — PROGRESS NOTES
"   LOS: 3 days    Patient Care Team:  No Known Provider as PCP - General    Reason For Visit:  F/U ELLE.  Subjective           Review of Systems:    Pulm: No soa   CV:  No CP      Objective       albumin human 25 g Intravenous TID   ferric gluconate (FERRLECIT) IVPB 125 mg Intravenous Daily   heparin (porcine) 5,000 Units Subcutaneous Q8H   insulin regular 0-7 Units Subcutaneous 4x Daily   ipratropium-albuterol 3 mL Nebulization Q6H - RT   midodrine 10 mg Oral Q8H   piperacillin-tazobactam 4.5 g Intravenous Q12H       sodium chloride 50 mL/hr Last Rate: 50 mL/hr (11/10/17 2139)         Vital Signs:  Blood pressure 106/60, pulse 61, temperature 98.1 °F (36.7 °C), temperature source Core, resp. rate 18, height 63\" (160 cm), weight 166 lb 0.1 oz (75.3 kg), SpO2 97 %.    Flowsheet Rows         First Filed Value    Admission Height  63\" (160 cm) Documented at 11/08/2017 2018    Admission Weight  166 lb 0.1 oz (75.3 kg) Documented at 11/08/2017 2018          11/10 0701 - 11/11 0700  In: 2699 [I.V.:2489]  Out: 1155 [Urine:1155]    Physical Exam:    General Appearance: NAD, alert and cooperative, Ox3  Eyes: PER, conjunctivae and sclerae normal, no icterus  Lungs: respirations regular and unlabored, no crepitus, clear to auscultation  Heart/CV: regular rhythm & normal rate, no murmur, no gallop, no rub and TR edema  Abdomen: not distended, soft, non-tender, no masses,  bowel sounds present  Skin: No rash, Warm and dry. JOHN CALI.    Radiology:            Labs:    Results from last 7 days  Lab Units 11/11/17  0355 11/10/17  0431 11/09/17  0402   WBC 10*3/mm3 13.36* 11.53* 12.06*   HEMOGLOBIN g/dL 10.2* 8.9* 10.2*   HEMATOCRIT % 31.0* 25.6* 29.6*   PLATELETS 10*3/mm3 207 151 166       Results from last 7 days  Lab Units 11/11/17  0355 11/10/17  1626 11/10/17  0431 11/09/17  1500 11/09/17  0402 11/08/17 2055   SODIUM mmol/L 136  --  140  --  136 136   POTASSIUM mmol/L 3.9 3.9 3.0* 3.2* 3.5 3.6   CHLORIDE mmol/L 102  --  112*  -- "  102 105   CO2 mmol/L 21.0  --  22.0  --  22.0 10.0*   BUN mg/dL 61*  --  61*  --  70* 68*   CREATININE mg/dL 4.70*  --  3.80*  --  4.60* 4.50*   CALCIUM mg/dL 7.7*  --  5.4*  --  6.7* 7.1*   PHOSPHORUS mg/dL 6.2*  --  4.5  --  5.3* 5.2*   MAGNESIUM mg/dL 2.7  --  1.5  --  1.9 1.9   ALBUMIN g/dL 2.70*  --   --   --  2.50*  --        Results from last 7 days  Lab Units 11/11/17  0355   GLUCOSE mg/dL 81         Results from last 7 days  Lab Units 11/09/17  0402   ALK PHOS U/L 67   BILIRUBIN mg/dL 0.4   ALT (SGPT) U/L 8   AST (SGOT) U/L 17       Results from last 7 days  Lab Units 11/09/17  0429   PH, ARTERIAL pH units 7.431   PO2 ART mm Hg 78.6*   PCO2, ARTERIAL mm Hg 34.4*   HCO3 ART mmol/L 22.9         Results from last 7 days  Lab Units 11/08/17  2131   COLOR UA  Yellow   CLARITY UA  Cloudy*   PH, URINE  6.0   SPECIFIC GRAVITY, URINE  1.009   GLUCOSE UA  Negative   KETONES UA  Negative   BILIRUBIN UA  Negative   PROTEIN UA  30 mg/dL (1+)*   BLOOD UA  Large (3+)*   LEUKOCYTES UA  Moderate (2+)*   NITRITE UA  Negative       Estimated Creatinine Clearance: 8.8 mL/min (by C-G formula based on Cr of 4.7).      Assessment     Principal Problem:    Sepsis, probable source is urine  Active Problems:    Acute renal failure (ARF)    UTI (urinary tract infection)    Gastroenteritis            Impression: NONOLIGURIC ELLE. WORSE GFR. HYPOTENSION. EDEMA WITH LOW ONCOTIC PRESSURE.          Recommendations: ALBUMIN IV. CHECK URINE EOS, CREAT, NA AND PROTEIN.      Orlando Melvin MD  11/11/17  9:43 AM

## 2017-11-11 NOTE — PROGRESS NOTES
"Critical Care Note     LOS: 3 days   Patient Care Team:  No Known Provider as PCP - General    Chief Complaint/Reason for visit:  Sepsis, renal failure      Subjective     83-year-old woman, nonsmoker with hypertension, dyslipidemia, GERD hospitalized November 6 with watery diarrhea for hypotension. She had gram-negative miles bacteremia with Escherichia coli in her urine. She was transferred to the ICU on BiPAP with volume overload and hypotension on a dopamine drip. Deep line was placed and she was changed to norepinephrine. Oral midodrine was initiated and pressors weaned. Nephrology evaluated and restarted saline.   Interval History:   Appetite is poor but denies nausea and vomiting. Still having loose stool but less often.    Review of Systems:    All systems were reviewed and negative except as noted in subjective.    Medical history, surgical history, social history, family history reviewed    Objective     Intake/Output:    Intake/Output Summary (Last 24 hours) at 11/11/17 1256  Last data filed at 11/11/17 1244   Gross per 24 hour   Intake             2547 ml   Output             1140 ml   Net             1407 ml       Nutrition:  Diet Regular    Infusions:    sodium chloride 50 mL/hr Last Rate: 50 mL/hr (11/10/17 0205)           Telemetry:  Sinus Rhythm: normal sinus rhythm          Vital Signs  Blood pressure (!) 126/113, pulse 79, temperature 98.1 °F (36.7 °C), temperature source Core, resp. rate 18, height 63\" (160 cm), weight 166 lb 0.1 oz (75.3 kg), SpO2 97 %.    Physical Exam:  General Appearance:  Elderly white woman in no distress    Head:  Normocephalic, atraumatic    Eyes:          Pupils equal reactive to light, no jaundice, conjunctiva pink    Ears:     Throat: Oral mucosa moist    Neck: left IJ line. Trachea midline    Back:   Thoracic spine kyphosis    Lungs:   Breath sounds are bilateral, equal, clear without wheeze or rhonchi     Heart:  Regular, S1, S2 auscultated,    Abdomen:   Bowel sounds " present, nondistended, soft    Rectal:   Deferred   Extremities: Trace edema of her upper extremities    Pulses: Pedal pulses present    Skin: Warm and dry    Lymph nodes:    Neurologic: Alert, oriented, cooperative, handgrip symmetric       Results Review:     I reviewed the patient's new clinical results.     Results from last 7 days  Lab Units 11/11/17  0355 11/10/17  1626 11/10/17  0431  11/09/17  0402   SODIUM mmol/L 136  --  140  --  136   POTASSIUM mmol/L 3.9 3.9 3.0*  < > 3.5   CHLORIDE mmol/L 102  --  112*  --  102   CO2 mmol/L 21.0  --  22.0  --  22.0   BUN mg/dL 61*  --  61*  --  70*   CREATININE mg/dL 4.70*  --  3.80*  --  4.60*   CALCIUM mg/dL 7.7*  --  5.4*  --  6.7*   BILIRUBIN mg/dL  --   --   --   --  0.4   ALK PHOS U/L  --   --   --   --  67   ALT (SGPT) U/L  --   --   --   --  8   AST (SGOT) U/L  --   --   --   --  17   GLUCOSE mg/dL 81  --  63*  --  112*   < > = values in this interval not displayed.    Results from last 7 days  Lab Units 11/11/17  0355 11/10/17  0431 11/09/17  0402   WBC 10*3/mm3 13.36* 11.53* 12.06*   HEMOGLOBIN g/dL 10.2* 8.9* 10.2*   HEMATOCRIT % 31.0* 25.6* 29.6*   PLATELETS 10*3/mm3 207 151 166       Results from last 7 days  Lab Units 11/09/17  0429   PH, ARTERIAL pH units 7.431   PO2 ART mm Hg 78.6*   PCO2, ARTERIAL mm Hg 34.4*   HCO3 ART mmol/L 22.9     Lab Results   Component Value Date    BLOODCX No growth at 2 days 11/08/2017     Lab Results   Component Value Date    URINECX 10,000-20,000 CFU/mL Escherichia coli (A) 11/08/2017   Escherichia coli is pansensitive  Stool for C. difficile toxin negative    I reviewed the patient's new imaging including images and reports.  S2 x-ray today, bibasilar infiltrates, left greater than right, poor inspiration, left IJ in the brachiocephalic vein    All medications reviewed.     albumin human 25 g Intravenous TID   ferric gluconate (FERRLECIT) IVPB 125 mg Intravenous Daily   heparin (porcine) 5,000 Units Subcutaneous Q8H    ipratropium-albuterol 3 mL Nebulization Q6H - RT   midodrine 10 mg Oral Q8H   piperacillin-tazobactam 4.5 g Intravenous Q12H         Assessment/Plan     Principal Problem:    Sepsis, probable source is urine  Active Problems:    UTI (urinary tract infection)    Acute renal failure (ARF)    Gastroenteritis    83-year-old woman who presented with sepsis, Escherichia coli urinary tract infection. Blood cultures and [place] were allegedly positive for a gram-negative miles. She was hypotensive and in acute renal failure. Serum creatinine remains significantly elevated at 4.7 but she is having adequate urine output. Chest x-ray reveals by basilar opacity suspicious for atelectasis, worse today particularly in the right base. Oxygenation is excellent on 2 L    PLAN:  Incentive spirometry  Ambulate  Midodrine  Change Zosyn to Rocephin  IV fluids per nephrology  Encourage by mouth intake  Getting albumin per nephrology  IV iron  Start Pepcid  Echocardiogram    VTE Prophylaxis: subcutaneous heparin    Stress Ulcer Prophylaxis: none    Angeles Mcgill MD  11/11/17  12:56 PM      Time: 30min  I personally provided care to this critically ill patient as documented above.  Critical care time does not include time spent on separately billed procedures.  Non of my critical care time was concurrent with other critical care providers.

## 2017-11-12 ENCOUNTER — APPOINTMENT (OUTPATIENT)
Dept: CT IMAGING | Facility: HOSPITAL | Age: 82
End: 2017-11-12

## 2017-11-12 LAB
ALBUMIN SERPL-MCNC: 3.4 G/DL (ref 3.2–4.8)
ANION GAP SERPL CALCULATED.3IONS-SCNC: 13 MMOL/L (ref 3–11)
ARTERIAL PATENCY WRIST A: ABNORMAL
ATMOSPHERIC PRESS: ABNORMAL MMHG
BASE EXCESS BLDA CALC-SCNC: -4.8 MMOL/L (ref 0–2)
BASOPHILS # BLD AUTO: 0.07 10*3/MM3 (ref 0–0.2)
BASOPHILS NFR BLD AUTO: 0.7 % (ref 0–1)
BDY SITE: ABNORMAL
BUN BLD-MCNC: 69 MG/DL (ref 9–23)
BUN/CREAT SERPL: 15.7 (ref 7–25)
CALCIUM SPEC-SCNC: 7.8 MG/DL (ref 8.7–10.4)
CHLORIDE SERPL-SCNC: 105 MMOL/L (ref 99–109)
CK MB SERPL-CCNC: 1.25 NG/ML (ref 0–5)
CK SERPL-CCNC: 38 U/L (ref 26–174)
CO2 BLDA-SCNC: 22 MMOL/L (ref 22–33)
CO2 SERPL-SCNC: 23 MMOL/L (ref 20–31)
COHGB MFR BLD: 1.6 % (ref 0–2)
CREAT BLD-MCNC: 4.4 MG/DL (ref 0.6–1.3)
DEPRECATED RDW RBC AUTO: 52.4 FL (ref 37–54)
EOSINOPHIL # BLD AUTO: 0.43 10*3/MM3 (ref 0–0.3)
EOSINOPHIL NFR BLD AUTO: 4.1 % (ref 0–3)
ERYTHROCYTE [DISTWIDTH] IN BLOOD BY AUTOMATED COUNT: 15 % (ref 11.3–14.5)
GFR SERPL CREATININE-BSD FRML MDRD: 10 ML/MIN/1.73
GLUCOSE BLD-MCNC: 92 MG/DL (ref 70–100)
HCO3 BLDA-SCNC: 20.8 MMOL/L (ref 20–26)
HCT VFR BLD AUTO: 24.8 % (ref 34.5–44)
HCT VFR BLD CALC: 26.9 %
HGB BLD-MCNC: 8.2 G/DL (ref 11.5–15.5)
HGB BLDA-MCNC: 8.8 G/DL (ref 14–18)
HOROWITZ INDEX BLD+IHG-RTO: 28 %
IMM GRANULOCYTES # BLD: 0.51 10*3/MM3 (ref 0–0.03)
IMM GRANULOCYTES NFR BLD: 4.8 % (ref 0–0.6)
INR PPP: 1.32
LYMPHOCYTES # BLD AUTO: 1.37 10*3/MM3 (ref 0.6–4.8)
LYMPHOCYTES NFR BLD AUTO: 12.9 % (ref 24–44)
MAGNESIUM SERPL-MCNC: 2.7 MG/DL (ref 1.3–2.7)
MCH RBC QN AUTO: 31.5 PG (ref 27–31)
MCHC RBC AUTO-ENTMCNC: 33.1 G/DL (ref 32–36)
MCV RBC AUTO: 95.4 FL (ref 80–99)
METHGB BLD QL: 1.2 % (ref 0–1.5)
MODALITY: ABNORMAL
MONOCYTES # BLD AUTO: 1.19 10*3/MM3 (ref 0–1)
MONOCYTES NFR BLD AUTO: 11.2 % (ref 0–12)
NEUTROPHILS # BLD AUTO: 7.04 10*3/MM3 (ref 1.5–8.3)
NEUTROPHILS NFR BLD AUTO: 66.3 % (ref 41–71)
OXYHGB MFR BLDV: 93.4 % (ref 94–99)
PCO2 BLDA: 39.3 MM HG (ref 35–45)
PH BLDA: 7.33 PH UNITS (ref 7.35–7.45)
PHOSPHATE SERPL-MCNC: 5.8 MG/DL (ref 2.4–5.1)
PLATELET # BLD AUTO: 170 10*3/MM3 (ref 150–450)
PMV BLD AUTO: 10.3 FL (ref 6–12)
PO2 BLDA: 77.3 MM HG (ref 83–108)
POTASSIUM BLD-SCNC: 3.4 MMOL/L (ref 3.5–5.5)
PROCALCITONIN SERPL-MCNC: 1.44 NG/ML
PROTHROMBIN TIME: 14.5 SECONDS (ref 9.6–11.5)
RBC # BLD AUTO: 2.6 10*6/MM3 (ref 3.89–5.14)
SODIUM BLD-SCNC: 141 MMOL/L (ref 132–146)
TROPONIN I SERPL-MCNC: 0.06 NG/ML
WBC NRBC COR # BLD: 10.61 10*3/MM3 (ref 3.5–10.8)

## 2017-11-12 PROCEDURE — 25010000002 NA FERRIC GLUC CPLX PER 12.5 MG: Performed by: INTERNAL MEDICINE

## 2017-11-12 PROCEDURE — 94799 UNLISTED PULMONARY SVC/PX: CPT

## 2017-11-12 PROCEDURE — 74176 CT ABD & PELVIS W/O CONTRAST: CPT

## 2017-11-12 PROCEDURE — 25010000002 HEPARIN (PORCINE) PER 1000 UNITS: Performed by: INTERNAL MEDICINE

## 2017-11-12 PROCEDURE — 36600 WITHDRAWAL OF ARTERIAL BLOOD: CPT | Performed by: NURSE PRACTITIONER

## 2017-11-12 PROCEDURE — 99291 CRITICAL CARE FIRST HOUR: CPT | Performed by: INTERNAL MEDICINE

## 2017-11-12 PROCEDURE — 84145 PROCALCITONIN (PCT): CPT | Performed by: INTERNAL MEDICINE

## 2017-11-12 PROCEDURE — 84484 ASSAY OF TROPONIN QUANT: CPT | Performed by: NURSE PRACTITIONER

## 2017-11-12 PROCEDURE — 25010000002 CEFTRIAXONE PER 250 MG: Performed by: INTERNAL MEDICINE

## 2017-11-12 PROCEDURE — 82550 ASSAY OF CK (CPK): CPT | Performed by: NURSE PRACTITIONER

## 2017-11-12 PROCEDURE — 85610 PROTHROMBIN TIME: CPT | Performed by: INTERNAL MEDICINE

## 2017-11-12 PROCEDURE — 80069 RENAL FUNCTION PANEL: CPT | Performed by: INTERNAL MEDICINE

## 2017-11-12 PROCEDURE — 25010000002 LORAZEPAM PER 2 MG: Performed by: NURSE PRACTITIONER

## 2017-11-12 PROCEDURE — 82805 BLOOD GASES W/O2 SATURATION: CPT | Performed by: NURSE PRACTITIONER

## 2017-11-12 PROCEDURE — 71250 CT THORAX DX C-: CPT

## 2017-11-12 PROCEDURE — 94640 AIRWAY INHALATION TREATMENT: CPT

## 2017-11-12 PROCEDURE — 93010 ELECTROCARDIOGRAM REPORT: CPT | Performed by: INTERNAL MEDICINE

## 2017-11-12 PROCEDURE — 93005 ELECTROCARDIOGRAM TRACING: CPT | Performed by: NURSE PRACTITIONER

## 2017-11-12 PROCEDURE — 83735 ASSAY OF MAGNESIUM: CPT | Performed by: INTERNAL MEDICINE

## 2017-11-12 PROCEDURE — 85025 COMPLETE CBC W/AUTO DIFF WBC: CPT | Performed by: INTERNAL MEDICINE

## 2017-11-12 PROCEDURE — 82553 CREATINE MB FRACTION: CPT | Performed by: NURSE PRACTITIONER

## 2017-11-12 PROCEDURE — 94760 N-INVAS EAR/PLS OXIMETRY 1: CPT

## 2017-11-12 RX ORDER — ALPRAZOLAM 0.25 MG/1
0.25 TABLET ORAL 3 TIMES DAILY PRN
Status: DISCONTINUED | OUTPATIENT
Start: 2017-11-12 | End: 2017-11-21 | Stop reason: HOSPADM

## 2017-11-12 RX ORDER — LORAZEPAM 2 MG/ML
0.5 INJECTION INTRAMUSCULAR ONCE
Status: COMPLETED | OUTPATIENT
Start: 2017-11-12 | End: 2017-11-12

## 2017-11-12 RX ADMIN — IPRATROPIUM BROMIDE AND ALBUTEROL SULFATE 3 ML: .5; 3 SOLUTION RESPIRATORY (INHALATION) at 20:00

## 2017-11-12 RX ADMIN — HEPARIN SODIUM 5000 UNITS: 5000 INJECTION, SOLUTION INTRAVENOUS; SUBCUTANEOUS at 14:51

## 2017-11-12 RX ADMIN — IPRATROPIUM BROMIDE AND ALBUTEROL SULFATE 3 ML: .5; 3 SOLUTION RESPIRATORY (INHALATION) at 07:53

## 2017-11-12 RX ADMIN — IPRATROPIUM BROMIDE AND ALBUTEROL SULFATE 3 ML: .5; 3 SOLUTION RESPIRATORY (INHALATION) at 00:23

## 2017-11-12 RX ADMIN — BUMETANIDE 1 MG/HR: 0.25 INJECTION INTRAMUSCULAR; INTRAVENOUS at 10:23

## 2017-11-12 RX ADMIN — MIDODRINE HYDROCHLORIDE 10 MG: 5 TABLET ORAL at 22:41

## 2017-11-12 RX ADMIN — SODIUM CHLORIDE 125 MG: 9 INJECTION, SOLUTION INTRAVENOUS at 08:55

## 2017-11-12 RX ADMIN — MIDODRINE HYDROCHLORIDE 10 MG: 5 TABLET ORAL at 05:59

## 2017-11-12 RX ADMIN — FAMOTIDINE 20 MG: 20 TABLET, FILM COATED ORAL at 08:53

## 2017-11-12 RX ADMIN — SODIUM CHLORIDE 50 ML/HR: 9 INJECTION, SOLUTION INTRAVENOUS at 06:18

## 2017-11-12 RX ADMIN — HEPARIN SODIUM 5000 UNITS: 5000 INJECTION, SOLUTION INTRAVENOUS; SUBCUTANEOUS at 21:01

## 2017-11-12 RX ADMIN — ALPRAZOLAM 0.25 MG: 0.25 TABLET ORAL at 10:23

## 2017-11-12 RX ADMIN — LORAZEPAM 0.5 MG: 2 INJECTION INTRAMUSCULAR; INTRAVENOUS at 12:56

## 2017-11-12 RX ADMIN — IPRATROPIUM BROMIDE AND ALBUTEROL SULFATE 3 ML: .5; 3 SOLUTION RESPIRATORY (INHALATION) at 03:14

## 2017-11-12 RX ADMIN — IPRATROPIUM BROMIDE AND ALBUTEROL SULFATE 3 ML: .5; 3 SOLUTION RESPIRATORY (INHALATION) at 16:20

## 2017-11-12 RX ADMIN — HEPARIN SODIUM 5000 UNITS: 5000 INJECTION, SOLUTION INTRAVENOUS; SUBCUTANEOUS at 05:59

## 2017-11-12 RX ADMIN — IPRATROPIUM BROMIDE AND ALBUTEROL SULFATE 3 ML: .5; 3 SOLUTION RESPIRATORY (INHALATION) at 11:45

## 2017-11-12 RX ADMIN — ALPRAZOLAM 0.25 MG: 0.25 TABLET ORAL at 21:13

## 2017-11-12 RX ADMIN — POTASSIUM CHLORIDE 40 MEQ: 750 CAPSULE, EXTENDED RELEASE ORAL at 10:36

## 2017-11-12 RX ADMIN — CEFTRIAXONE SODIUM 1 G: 1 INJECTION, SOLUTION INTRAVENOUS at 14:51

## 2017-11-12 NOTE — PROGRESS NOTES
"   LOS: 4 days    Patient Care Team:  No Known Provider as PCP - General    Reason For Visit:  F/U ELLE  Subjective           Review of Systems:    Pulm: SOME soa   CV:  No CP      Objective       ceftriaxone 1 g Intravenous Q24H   famotidine 20 mg Oral Daily   ferric gluconate (FERRLECIT) IVPB 125 mg Intravenous Daily   heparin (porcine) 5,000 Units Subcutaneous Q8H   ipratropium-albuterol 3 mL Nebulization Q4H - RT   midodrine 10 mg Oral Q8H       bumetanide (BUMEX) infusion 1 mg/hr         Vital Signs:  Blood pressure 117/64, pulse 68, temperature 98.8 °F (37.1 °C), temperature source Core, resp. rate 20, height 63\" (160 cm), weight 166 lb 0.1 oz (75.3 kg), SpO2 96 %.    Flowsheet Rows         First Filed Value    Admission Height  63\" (160 cm) Documented at 11/08/2017 2018    Admission Weight  166 lb 0.1 oz (75.3 kg) Documented at 11/08/2017 2018 11/11 0701 - 11/12 0700  In: 2588.7 [P.O.:340; I.V.:677]  Out: 1360 [Urine:1360]    Physical Exam:    General Appearance: NAD, alert and cooperative, Ox3  Eyes: PER, conjunctivae and sclerae normal, no icterus  Lungs: FEW WHEEZES AND CRACKLES  Heart/CV: regular rhythm & normal rate, no murmur, no gallop, no rub and 1+ edema  Abdomen: not distended, soft, non-tender, no masses,  bowel sounds present  Skin: No rash, Warm and dry. JOHN CALI.    Radiology:            Labs: U EOS NEG. U NA 63. P/C RATIO 870.    Results from last 7 days  Lab Units 11/12/17  0438 11/11/17  0355 11/10/17  0431   WBC 10*3/mm3 10.61 13.36* 11.53*   HEMOGLOBIN g/dL 8.2* 10.2* 8.9*   HEMATOCRIT % 24.8* 31.0* 25.6*   PLATELETS 10*3/mm3 170 207 151       Results from last 7 days  Lab Units 11/12/17  0438 11/11/17  0355 11/10/17  1626 11/10/17  0431  11/09/17  0402   SODIUM mmol/L 141 136  --  140  --  136   POTASSIUM mmol/L 3.4* 3.9 3.9 3.0*  < > 3.5   CHLORIDE mmol/L 105 102  --  112*  --  102   CO2 mmol/L 23.0 21.0  --  22.0  --  22.0   BUN mg/dL 69* 61*  --  61*  --  70*   CREATININE mg/dL " 4.40* 4.70*  --  3.80*  --  4.60*   CALCIUM mg/dL 7.8* 7.7*  --  5.4*  --  6.7*   PHOSPHORUS mg/dL 5.8* 6.2*  --  4.5  --  5.3*   MAGNESIUM mg/dL 2.7 2.7  --  1.5  --  1.9   ALBUMIN g/dL 3.40 2.70*  --   --   --  2.50*   < > = values in this interval not displayed.    Results from last 7 days  Lab Units 11/12/17  0438   GLUCOSE mg/dL 92         Results from last 7 days  Lab Units 11/09/17  0402   ALK PHOS U/L 67   BILIRUBIN mg/dL 0.4   ALT (SGPT) U/L 8   AST (SGOT) U/L 17       Results from last 7 days  Lab Units 11/09/17  0429   PH, ARTERIAL pH units 7.431   PO2 ART mm Hg 78.6*   PCO2, ARTERIAL mm Hg 34.4*   HCO3 ART mmol/L 22.9         Results from last 7 days  Lab Units 11/08/17  2131   COLOR UA  Yellow   CLARITY UA  Cloudy*   PH, URINE  6.0   SPECIFIC GRAVITY, URINE  1.009   GLUCOSE UA  Negative   KETONES UA  Negative   BILIRUBIN UA  Negative   PROTEIN UA  30 mg/dL (1+)*   BLOOD UA  Large (3+)*   LEUKOCYTES UA  Moderate (2+)*   NITRITE UA  Negative       Estimated Creatinine Clearance: 9.4 mL/min (by C-G formula based on Cr of 4.4).      Assessment     Principal Problem:    Sepsis, probable source is urine  Active Problems:    Acute renal failure (ARF)    UTI (urinary tract infection)    Gastroenteritis            Impression: NONOLIGURIC ELLE. GFR IMPROVING. VOLUME OVERLOAD.            Recommendations: BUMEX DRIP.      Orlando Melvin MD  11/12/17  9:30 AM

## 2017-11-12 NOTE — SIGNIFICANT NOTE
"Asked to see Ms. Garcia due to an increase in respiratory distress, Tachypnea, and increase anxiety. She states she feels that she \"wants to die\". She has expiratory stridor, bilateral wheezes and just received a Neb treatment. She reports this is anxiety related and has received .5 mg of Xanax. Will discuss with Dr. Mcgill. I have ordered at CT of the chest/abdomen/ and pelvis in the meantime. Will also order an ABG.     ZULY Leroy, [facility]  Pulmonary and Critical Care Service  11/12/2017 12:28 PM  "

## 2017-11-12 NOTE — PLAN OF CARE
Problem: Patient Care Overview (Adult)  Goal: Adult Individualization and Mutuality  Outcome: Ongoing (interventions implemented as appropriate)  Goal: Discharge Needs Assessment  Outcome: Ongoing (interventions implemented as appropriate)    Problem: Renal Failure/Kidney Injury, Acute (Adult)  Goal: Signs and Symptoms of Listed Potential Problems Will be Absent or Manageable (Renal Failure/Kidney Injury, Acute)  Outcome: Ongoing (interventions implemented as appropriate)    Problem: Sepsis (Adult)  Goal: Signs and Symptoms of Listed Potential Problems Will be Absent or Manageable (Sepsis)  Outcome: Ongoing (interventions implemented as appropriate)    Problem: Pressure Ulcer Risk (Tal Scale) (Adult,Obstetrics,Pediatric)  Goal: Identify Related Risk Factors and Signs and Symptoms  Outcome: Ongoing (interventions implemented as appropriate)  Goal: Skin Integrity  Outcome: Ongoing (interventions implemented as appropriate)    Problem: Fluid Volume Excess (Adult,Obstetrics,Pediatric)  Goal: Stable Weight  Outcome: Ongoing (interventions implemented as appropriate)  Goal: Balanced Intake/Output  Outcome: Ongoing (interventions implemented as appropriate)

## 2017-11-12 NOTE — PLAN OF CARE
Problem: Patient Care Overview (Adult)  Goal: Plan of Care Review  Outcome: Outcome(s) achieved Date Met:  11/12/17 11/12/17 0217   Coping/Psychosocial Response Interventions   Plan Of Care Reviewed With patient   Patient Care Overview   Progress progress towards functional goals is fair   Outcome Evaluation   Outcome Summary/Follow up Plan SOB and wheezing with minimal activity. Nebs Q4hrs. BS very diminished in bases, non prod cough. USing IS 500ml, Edematous, abd taut, and thighs tight. UO adequate. Rec'ing 25 gms 5% Albumin 3x / day. Taking cl liquids w/o nausea. Having loose dark green stools occ.          Problem: Renal Failure/Kidney Injury, Acute (Adult)  Goal: Signs and Symptoms of Listed Potential Problems Will be Absent or Manageable (Renal Failure/Kidney Injury, Acute)  Outcome: Ongoing (interventions implemented as appropriate)    11/12/17 0217   Renal Failure/Kidney Injury, Acute   Problems Assessed (Acute Renal Failure/Kidney Injury) all   Problems Present (Acute Renal Failure/Kidney Injury) fluid imbalance;pulmonary edema         Problem: Sepsis (Adult)  Goal: Signs and Symptoms of Listed Potential Problems Will be Absent or Manageable (Sepsis)  Outcome: Ongoing (interventions implemented as appropriate)    Problem: Pressure Ulcer Risk (Tal Scale) (Adult,Obstetrics,Pediatric)  Goal: Identify Related Risk Factors and Signs and Symptoms  Outcome: Ongoing (interventions implemented as appropriate)    11/12/17 0217   Pressure Ulcer Risk (Tal Scale)   Related Risk Factors (Pressure Ulcer Risk (Tal Scale)) mobility impaired  (mobility impaired due to SOB)

## 2017-11-12 NOTE — NURSING NOTE
"Pt wheezing at beginning of shift. Significantly worsened around 1030AM. Xanax administered with no anxiety relief. CVP 25- d/c'd IVFs and renal ordered Bumex gtt for a total of 4mg over 4 hours. Diuresed >1L. 40 meq K given prior to Bumex.  Dyspnea continues- APRN notified and ordered CT chest/abd/pelvis that shows bilateral pleural effusions, ground glass opacities, anasarca, and colon distention. EKG normal with troponin level 0.05  One time dose of 0.5 Ativan with significant relief. Patient passing stool and flatus towards end of shift. Overall patient's dyspnea has improved with decreased exp wheezing. Patient verbalizes \"I feel so much better, thank you\".   "

## 2017-11-12 NOTE — PROGRESS NOTES
"Critical Care Note     LOS: 4 days   Patient Care Team:  No Known Provider as PCP - General    Chief Complaint/Reason for visit:  Sepsis, renal failure      Subjective     83-year-old woman, nonsmoker with hypertension, dyslipidemia, GERD hospitalized November 6 with watery diarrhea for hypotension. She had gram-negative miles bacteremia with Escherichia coli in her urine. She was transferred to the ICU on BiPAP with volume overload and hypotension on a dopamine drip. Deep line was placed and she was changed to norepinephrine. Oral midodrine was initiated and pressors weaned. Nephrology evaluated and restarted saline.   Interval History:   She became short of air developed inspiratory crackles and wheezing. She denies nausea or vomiting. Diarrhea has resolved. She denies chest pain.    Review of Systems:    All systems were reviewed and negative except as noted in subjective.    Medical history, surgical history, social history, family history reviewed    Objective     Intake/Output:    Intake/Output Summary (Last 24 hours) at 11/12/17 1550  Last data filed at 11/12/17 1000   Gross per 24 hour   Intake           1732.7 ml   Output             1460 ml   Net            272.7 ml       Nutrition:  Diet Regular    Infusions:           Telemetry:  Sinus Rhythm: normal sinus rhythm          Vital Signs  Blood pressure 118/82, pulse 80, temperature 98.5 °F (36.9 °C), temperature source Axillary, resp. rate 20, height 63\" (160 cm), weight 166 lb 0.1 oz (75.3 kg), SpO2 97 %.    Physical Exam:  General Appearance:  Elderly white woman in Moderate respiratory distress    Head:  Normocephalic, atraumatic    Eyes:          Pupils equal reactive to light, no jaundice, conjunctiva pink    Ears:     Throat: Oral mucosa moist    Neck: left IJ line. Trachea midline    Back:   Thoracic spine kyphosis    Lungs:   Breath sounds are bilateral, With diffuse expiratory wheezing and bibasilar crackles, left greater than right     Heart:  " Regular, S1, S2 auscultated,    Abdomen:   Bowel sounds present, nondistended, soft    Rectal:   Deferred   Extremities:  edema of her upper extremities, Buttocks and thighs    Pulses: Pedal pulses present    Skin: Warm and dry    Lymph nodes:    Neurologic: Alert, oriented, cooperative, handgrip symmetric       Results Review:     I reviewed the patient's new clinical results.     Results from last 7 days  Lab Units 11/12/17  0438 11/11/17  0355 11/10/17  1626 11/10/17  0431  11/09/17  0402   SODIUM mmol/L 141 136  --  140  --  136   POTASSIUM mmol/L 3.4* 3.9 3.9 3.0*  < > 3.5   CHLORIDE mmol/L 105 102  --  112*  --  102   CO2 mmol/L 23.0 21.0  --  22.0  --  22.0   BUN mg/dL 69* 61*  --  61*  --  70*   CREATININE mg/dL 4.40* 4.70*  --  3.80*  --  4.60*   CALCIUM mg/dL 7.8* 7.7*  --  5.4*  --  6.7*   BILIRUBIN mg/dL  --   --   --   --   --  0.4   ALK PHOS U/L  --   --   --   --   --  67   ALT (SGPT) U/L  --   --   --   --   --  8   AST (SGOT) U/L  --   --   --   --   --  17   GLUCOSE mg/dL 92 81  --  63*  --  112*   < > = values in this interval not displayed.    Results from last 7 days  Lab Units 11/12/17  0438 11/11/17  0355 11/10/17  0431   WBC 10*3/mm3 10.61 13.36* 11.53*   HEMOGLOBIN g/dL 8.2* 10.2* 8.9*   HEMATOCRIT % 24.8* 31.0* 25.6*   PLATELETS 10*3/mm3 170 207 151       Results from last 7 days  Lab Units 11/12/17  1347   PH, ARTERIAL pH units 7.331*   PO2 ART mm Hg 77.3*   PCO2, ARTERIAL mm Hg 39.3   HCO3 ART mmol/L 20.8     Lab Results   Component Value Date    BLOODCX No growth at 3 days 11/08/2017     Lab Results   Component Value Date    URINECX 10,000-20,000 CFU/mL Escherichia coli (A) 11/08/2017   Escherichia coli is pansensitive  Stool for C. difficile toxin negative    I reviewed the patient's new imaging including images and reports.        IMPRESSION:  Bibasilar airspace disease and probable effusions, left  greater than right; insignificantly changed since 11/09/2017.      DICTATED:      11/11/2017  All medications reviewed.     ceftriaxone 1 g Intravenous Q24H   famotidine 20 mg Oral Daily   ferric gluconate (FERRLECIT) IVPB 125 mg Intravenous Daily   heparin (porcine) 5,000 Units Subcutaneous Q8H   ipratropium-albuterol 3 mL Nebulization Q4H - RT   midodrine 10 mg Oral Q8H         Assessment/Plan     Principal Problem:    Sepsis, probable source is urine  Active Problems:    UTI (urinary tract infection)    Acute renal failure (ARF)    Gastroenteritis    83-year-old woman who presented with sepsis, Escherichia coli urinary tract infection, diarrhea. Blood cultures from [place] were allegedly positive for a gram-negative miles. She was hypotensive and in acute renal failure. Serum creatinine remains significantly elevated at 4.4.  Now she is short of air with crackles suggestive of volume overload after receiving albumin. Serology ordered Bumex and she is finally having urine output. She required a low-dose Ativan and now is resting comfortably though has persistent wheezing and crackles on examination. EKG does not show any acute findings. Blood gas shows adequate ventilation and oxygenation though she has a mild embolic acidosis. Troponin is 0.055 which is only mildly abnormal. CK is normal at 38.    PLAN:  BiPAP as needed  Incentive spirometry  Midodrine   Rocephin  IV fluids per nephrology, KVO  Bumex drip  Encourage by mouth intake  IV iron  Start Pepcid  Echocardiogram  Again discussed CODE STATUS, whether they would want intubation or dialysis. And they feel that if she has a good chance of improving then they might want temporary support.    VTE Prophylaxis: subcutaneous heparin    Stress Ulcer Prophylaxis: none    Angeles Mcgill MD  11/12/17  3:50 PM      Time: 30min  I personally provided care to this critically ill patient as documented above.  Critical care time does not include time spent on separately billed procedures.  Non of my critical care time was concurrent with other  critical care providers.

## 2017-11-13 ENCOUNTER — APPOINTMENT (OUTPATIENT)
Dept: CARDIOLOGY | Facility: HOSPITAL | Age: 82
End: 2017-11-13
Attending: INTERNAL MEDICINE

## 2017-11-13 ENCOUNTER — APPOINTMENT (OUTPATIENT)
Dept: GENERAL RADIOLOGY | Facility: HOSPITAL | Age: 82
End: 2017-11-13

## 2017-11-13 LAB
ALBUMIN SERPL-MCNC: 3.5 G/DL (ref 3.2–4.8)
ANION GAP SERPL CALCULATED.3IONS-SCNC: 13 MMOL/L (ref 3–11)
BACTERIA SPEC AEROBE CULT: NORMAL
BACTERIA SPEC AEROBE CULT: NORMAL
BH CV ECHO MEAS - AO MAX PG (FULL): 5.3 MMHG
BH CV ECHO MEAS - AO MAX PG: 10 MMHG
BH CV ECHO MEAS - AO MEAN PG (FULL): 2.2 MMHG
BH CV ECHO MEAS - AO MEAN PG: 4.4 MMHG
BH CV ECHO MEAS - AO ROOT AREA (BSA CORRECTED): 1.6
BH CV ECHO MEAS - AO ROOT AREA: 6.4 CM^2
BH CV ECHO MEAS - AO ROOT DIAM: 2.9 CM
BH CV ECHO MEAS - AO V2 MAX: 154.2 CM/SEC
BH CV ECHO MEAS - AO V2 MEAN: 95.1 CM/SEC
BH CV ECHO MEAS - AO V2 VTI: 31.8 CM
BH CV ECHO MEAS - ASC AORTA: 2.8 CM
BH CV ECHO MEAS - AVA(I,A): 2.1 CM^2
BH CV ECHO MEAS - AVA(I,D): 2.1 CM^2
BH CV ECHO MEAS - AVA(V,A): 2.1 CM^2
BH CV ECHO MEAS - AVA(V,D): 2.1 CM^2
BH CV ECHO MEAS - BSA(HAYCOCK): 1.9 M^2
BH CV ECHO MEAS - BSA: 1.8 M^2
BH CV ECHO MEAS - BZI_BMI: 29.4 KILOGRAMS/M^2
BH CV ECHO MEAS - BZI_METRIC_HEIGHT: 160 CM
BH CV ECHO MEAS - BZI_METRIC_WEIGHT: 75.3 KG
BH CV ECHO MEAS - EDV(CUBED): 90.7 ML
BH CV ECHO MEAS - EDV(TEICH): 92.1 ML
BH CV ECHO MEAS - EF(CUBED): 83.6 %
BH CV ECHO MEAS - EF(TEICH): 76.7 %
BH CV ECHO MEAS - ESV(CUBED): 14.9 ML
BH CV ECHO MEAS - ESV(TEICH): 21.5 ML
BH CV ECHO MEAS - FS: 45.2 %
BH CV ECHO MEAS - IVS/LVPW: 1
BH CV ECHO MEAS - IVSD: 0.8 CM
BH CV ECHO MEAS - LA DIMENSION: 3.9 CM
BH CV ECHO MEAS - LA/AO: 1.4
BH CV ECHO MEAS - LV MASS(C)D: 111.1 GRAMS
BH CV ECHO MEAS - LV MASS(C)DI: 62.2 GRAMS/M^2
BH CV ECHO MEAS - LV MAX PG: 4.7 MMHG
BH CV ECHO MEAS - LV MEAN PG: 2.2 MMHG
BH CV ECHO MEAS - LV V1 MAX: 108.6 CM/SEC
BH CV ECHO MEAS - LV V1 MEAN: 65.2 CM/SEC
BH CV ECHO MEAS - LV V1 VTI: 22.3 CM
BH CV ECHO MEAS - LVIDD: 4.5 CM
BH CV ECHO MEAS - LVIDS: 3 CM
BH CV ECHO MEAS - LVOT AREA (M): 3.1 CM^2
BH CV ECHO MEAS - LVOT AREA: 3 CM^2
BH CV ECHO MEAS - LVOT DIAM: 2 CM
BH CV ECHO MEAS - LVPWD: 0.8 CM
BH CV ECHO MEAS - MV A MAX VEL: 91.9 CM/SEC
BH CV ECHO MEAS - MV DEC TIME: 0.11 SEC
BH CV ECHO MEAS - MV E MAX VEL: 114.8 CM/SEC
BH CV ECHO MEAS - MV E/A: 1.2
BH CV ECHO MEAS - PA ACC SLOPE: 955.5 CM/SEC^2
BH CV ECHO MEAS - PA ACC TIME: 0.11 SEC
BH CV ECHO MEAS - PA MAX PG: 4.4 MMHG
BH CV ECHO MEAS - PA PR(ACCEL): 28.3 MMHG
BH CV ECHO MEAS - PA V2 MAX: 104.8 CM/SEC
BH CV ECHO MEAS - RAP SYSTOLE: 8 MMHG
BH CV ECHO MEAS - RVDD: 1.5 CM
BH CV ECHO MEAS - RVSP: 40 MMHG
BH CV ECHO MEAS - SI(AO): 114 ML/M^2
BH CV ECHO MEAS - SI(CUBED): 42.4 ML/M^2
BH CV ECHO MEAS - SI(LVOT): 37.8 ML/M^2
BH CV ECHO MEAS - SI(TEICH): 39.5 ML/M^2
BH CV ECHO MEAS - SV(AO): 203.6 ML
BH CV ECHO MEAS - SV(CUBED): 75.8 ML
BH CV ECHO MEAS - SV(LVOT): 67.5 ML
BH CV ECHO MEAS - SV(TEICH): 70.6 ML
BH CV ECHO MEAS - TAPSE (>1.6): 1.9 CM2
BH CV ECHO MEAS - TR MAX VEL: 263.7 CM/SEC
BH CV ECHO MEAS - TV MAX PG: 1.2 MMHG
BH CV ECHO MEAS - TV V2 MAX: 53.8 CM/SEC
BH CV XLRA - RV BASE: 2.7 CM
BH CV XLRA - RV LENGTH: 5.4 CM
BH CV XLRA - RV MID: 2.4 CM
BUN BLD-MCNC: 64 MG/DL (ref 9–23)
BUN/CREAT SERPL: 14.5 (ref 7–25)
CALCIUM SPEC-SCNC: 8.9 MG/DL (ref 8.7–10.4)
CHLORIDE SERPL-SCNC: 108 MMOL/L (ref 99–109)
CO2 SERPL-SCNC: 22 MMOL/L (ref 20–31)
CREAT BLD-MCNC: 4.4 MG/DL (ref 0.6–1.3)
DEPRECATED RDW RBC AUTO: 55.7 FL (ref 37–54)
ERYTHROCYTE [DISTWIDTH] IN BLOOD BY AUTOMATED COUNT: 15.4 % (ref 11.3–14.5)
GFR SERPL CREATININE-BSD FRML MDRD: 10 ML/MIN/1.73
GLUCOSE BLD-MCNC: 101 MG/DL (ref 70–100)
HCT VFR BLD AUTO: 26.7 % (ref 34.5–44)
HGB BLD-MCNC: 8.6 G/DL (ref 11.5–15.5)
LV EF 2D ECHO EST: 65 %
MAXIMAL PREDICTED HEART RATE: 137 BPM
MCH RBC QN AUTO: 32 PG (ref 27–31)
MCHC RBC AUTO-ENTMCNC: 32.2 G/DL (ref 32–36)
MCV RBC AUTO: 99.3 FL (ref 80–99)
PHOSPHATE SERPL-MCNC: 6 MG/DL (ref 2.4–5.1)
PLATELET # BLD AUTO: 178 10*3/MM3 (ref 150–450)
PMV BLD AUTO: 9.5 FL (ref 6–12)
POTASSIUM BLD-SCNC: 3.5 MMOL/L (ref 3.5–5.5)
RBC # BLD AUTO: 2.69 10*6/MM3 (ref 3.89–5.14)
SODIUM BLD-SCNC: 143 MMOL/L (ref 132–146)
STRESS TARGET HR: 116 BPM
WBC NRBC COR # BLD: 10.65 10*3/MM3 (ref 3.5–10.8)

## 2017-11-13 PROCEDURE — 71010 HC CHEST PA OR AP: CPT

## 2017-11-13 PROCEDURE — 25010000002 HEPARIN (PORCINE) PER 1000 UNITS: Performed by: INTERNAL MEDICINE

## 2017-11-13 PROCEDURE — 94799 UNLISTED PULMONARY SVC/PX: CPT

## 2017-11-13 PROCEDURE — 94760 N-INVAS EAR/PLS OXIMETRY 1: CPT

## 2017-11-13 PROCEDURE — 93306 TTE W/DOPPLER COMPLETE: CPT | Performed by: INTERNAL MEDICINE

## 2017-11-13 PROCEDURE — 93306 TTE W/DOPPLER COMPLETE: CPT

## 2017-11-13 PROCEDURE — 94640 AIRWAY INHALATION TREATMENT: CPT

## 2017-11-13 PROCEDURE — 99233 SBSQ HOSP IP/OBS HIGH 50: CPT | Performed by: INTERNAL MEDICINE

## 2017-11-13 PROCEDURE — 85027 COMPLETE CBC AUTOMATED: CPT | Performed by: NURSE PRACTITIONER

## 2017-11-13 PROCEDURE — 25010000002 NA FERRIC GLUC CPLX PER 12.5 MG: Performed by: INTERNAL MEDICINE

## 2017-11-13 PROCEDURE — 25010000002 CEFTRIAXONE PER 250 MG: Performed by: INTERNAL MEDICINE

## 2017-11-13 PROCEDURE — 97162 PT EVAL MOD COMPLEX 30 MIN: CPT

## 2017-11-13 PROCEDURE — 80069 RENAL FUNCTION PANEL: CPT | Performed by: INTERNAL MEDICINE

## 2017-11-13 RX ORDER — LORAZEPAM 2 MG/ML
0.5 INJECTION INTRAMUSCULAR EVERY 8 HOURS PRN
Status: DISCONTINUED | OUTPATIENT
Start: 2017-11-13 | End: 2017-11-15

## 2017-11-13 RX ORDER — BUMETANIDE 0.25 MG/ML
2 INJECTION INTRAMUSCULAR; INTRAVENOUS ONCE
Status: COMPLETED | OUTPATIENT
Start: 2017-11-13 | End: 2017-11-13

## 2017-11-13 RX ADMIN — IPRATROPIUM BROMIDE AND ALBUTEROL SULFATE 3 ML: .5; 3 SOLUTION RESPIRATORY (INHALATION) at 10:31

## 2017-11-13 RX ADMIN — SODIUM CHLORIDE 125 MG: 9 INJECTION, SOLUTION INTRAVENOUS at 09:10

## 2017-11-13 RX ADMIN — ALPRAZOLAM 0.25 MG: 0.25 TABLET ORAL at 10:27

## 2017-11-13 RX ADMIN — IPRATROPIUM BROMIDE AND ALBUTEROL SULFATE 3 ML: .5; 3 SOLUTION RESPIRATORY (INHALATION) at 06:34

## 2017-11-13 RX ADMIN — FAMOTIDINE 20 MG: 20 TABLET, FILM COATED ORAL at 09:10

## 2017-11-13 RX ADMIN — IPRATROPIUM BROMIDE AND ALBUTEROL SULFATE 3 ML: .5; 3 SOLUTION RESPIRATORY (INHALATION) at 19:53

## 2017-11-13 RX ADMIN — HEPARIN SODIUM 5000 UNITS: 5000 INJECTION, SOLUTION INTRAVENOUS; SUBCUTANEOUS at 14:10

## 2017-11-13 RX ADMIN — IPRATROPIUM BROMIDE AND ALBUTEROL SULFATE 3 ML: .5; 3 SOLUTION RESPIRATORY (INHALATION) at 03:41

## 2017-11-13 RX ADMIN — IPRATROPIUM BROMIDE AND ALBUTEROL SULFATE 3 ML: .5; 3 SOLUTION RESPIRATORY (INHALATION) at 01:23

## 2017-11-13 RX ADMIN — IPRATROPIUM BROMIDE AND ALBUTEROL SULFATE 3 ML: .5; 3 SOLUTION RESPIRATORY (INHALATION) at 23:09

## 2017-11-13 RX ADMIN — HEPARIN SODIUM 5000 UNITS: 5000 INJECTION, SOLUTION INTRAVENOUS; SUBCUTANEOUS at 22:16

## 2017-11-13 RX ADMIN — ALPRAZOLAM 0.25 MG: 0.25 TABLET ORAL at 18:09

## 2017-11-13 RX ADMIN — BUMETANIDE 2 MG: 0.25 INJECTION INTRAMUSCULAR; INTRAVENOUS at 09:15

## 2017-11-13 RX ADMIN — CEFTRIAXONE SODIUM 1 G: 1 INJECTION, SOLUTION INTRAVENOUS at 14:12

## 2017-11-13 RX ADMIN — IPRATROPIUM BROMIDE AND ALBUTEROL SULFATE 3 ML: .5; 3 SOLUTION RESPIRATORY (INHALATION) at 16:16

## 2017-11-13 NOTE — PROGRESS NOTES
"   LOS: 5 days    Patient Care Team:  No Known Provider as PCP - General    Reason For Visit:  F/U ELLE.  Subjective           Review of Systems:    Pulm: LESS soa   CV:  No CP      Objective       ceftriaxone 1 g Intravenous Q24H   famotidine 20 mg Oral Daily   ferric gluconate (FERRLECIT) IVPB 125 mg Intravenous Daily   heparin (porcine) 5,000 Units Subcutaneous Q8H   ipratropium-albuterol 3 mL Nebulization Q4H - RT   midodrine 10 mg Oral Q8H            Vital Signs:  Blood pressure 125/68, pulse 82, temperature 99 °F (37.2 °C), temperature source Core, resp. rate 20, height 63\" (160 cm), weight 166 lb 0.1 oz (75.3 kg), SpO2 95 %.    Flowsheet Rows         First Filed Value    Admission Height  63\" (160 cm) Documented at 11/08/2017 2018    Admission Weight  166 lb 0.1 oz (75.3 kg) Documented at 11/08/2017 2018 11/12 0701 - 11/13 0700  In: 603 [P.O.:245; I.V.:158]  Out: 2750 [Urine:2750]    Physical Exam:    General Appearance: NAD, alert and cooperative, Ox3  Eyes: PER, conjunctivae and sclerae normal, no icterus  Lungs: FEW CRACKLES.   Heart/CV: regular rhythm & normal rate, no murmur, no gallop, no rub and TR edema  Abdomen: not distended, soft, non-tender, no masses,  bowel sounds present  Skin: No rash, Warm and dry. [facility]    Radiology:            Labs:    Results from last 7 days  Lab Units 11/13/17  0732 11/12/17 0438 11/11/17  0355   WBC 10*3/mm3 10.65 10.61 13.36*   HEMOGLOBIN g/dL 8.6* 8.2* 10.2*   HEMATOCRIT % 26.7* 24.8* 31.0*   PLATELETS 10*3/mm3 178 170 207       Results from last 7 days  Lab Units 11/13/17  0732 11/12/17  0438 11/11/17  0355 11/10/17  1626 11/10/17  0431  11/09/17  0402   SODIUM mmol/L 143 141 136  --  140  --  136   POTASSIUM mmol/L 3.5 3.4* 3.9 3.9 3.0*  < > 3.5   CHLORIDE mmol/L 108 105 102  --  112*  --  102   CO2 mmol/L 22.0 23.0 21.0  --  22.0  --  22.0   BUN mg/dL 64* 69* 61*  --  61*  --  70*   CREATININE mg/dL 4.40* 4.40* 4.70*  --  3.80*  --  4.60*   CALCIUM " mg/dL 8.9 7.8* 7.7*  --  5.4*  --  6.7*   PHOSPHORUS mg/dL 6.0* 5.8* 6.2*  --  4.5  --  5.3*   MAGNESIUM mg/dL  --  2.7 2.7  --  1.5  --  1.9   ALBUMIN g/dL 3.50 3.40 2.70*  --   --   --  2.50*   < > = values in this interval not displayed.    Results from last 7 days  Lab Units 11/13/17  0732   GLUCOSE mg/dL 101*         Results from last 7 days  Lab Units 11/09/17  0402   ALK PHOS U/L 67   BILIRUBIN mg/dL 0.4   ALT (SGPT) U/L 8   AST (SGOT) U/L 17       Results from last 7 days  Lab Units 11/12/17  1347   PH, ARTERIAL pH units 7.331*   PO2 ART mm Hg 77.3*   PCO2, ARTERIAL mm Hg 39.3   HCO3 ART mmol/L 20.8         Results from last 7 days  Lab Units 11/08/17  2131   COLOR UA  Yellow   CLARITY UA  Cloudy*   PH, URINE  6.0   SPECIFIC GRAVITY, URINE  1.009   GLUCOSE UA  Negative   KETONES UA  Negative   BILIRUBIN UA  Negative   PROTEIN UA  30 mg/dL (1+)*   BLOOD UA  Large (3+)*   LEUKOCYTES UA  Moderate (2+)*   NITRITE UA  Negative       Estimated Creatinine Clearance: 9.4 mL/min (by C-G formula based on Cr of 4.4).      Assessment     Principal Problem:    Sepsis, probable source is urine  Active Problems:    Acute renal failure (ARF)    UTI (urinary tract infection)    Gastroenteritis            Impression: NONOLIGURIC ELLE. STABLE GFR. INCREASED U/O. ANEMIA.            Recommendations: BUMEX 2 MG X 1. LAB AM.      Orlando Melvin MD  11/13/17  8:39 AM

## 2017-11-13 NOTE — PLAN OF CARE
Problem: Patient Care Overview (Adult)  Goal: Plan of Care Review  Outcome: Ongoing (interventions implemented as appropriate)    11/13/17 1006   Coping/Psychosocial Response Interventions   Plan Of Care Reviewed With patient   Outcome Evaluation   Outcome Summary/Follow up Plan PT evaluation complete. Pt demonstrates generalized weakness and decreased indep re: functional mobility with evolving signs/symptoms, warranting further skilled PT services to promote PLOF. Limited today by fatigue but motivated to participate. Recommend SNF placement at d/c.          Problem: Inpatient Physical Therapy  Goal: Bed Mobility Goal LTG- PT  Outcome: Ongoing (interventions implemented as appropriate)    11/13/17 1006   Bed Mobility PT LTG   Bed Mobility PT LTG, Date Established 11/13/17   Bed Mobility PT LTG, Time to Achieve 2 wks   Bed Mobility PT LTG, Activity Type supine to sit/sit to supine   Bed Mobility PT LTG, [place] Level supervision required       Goal: Transfer Training Goal 1 LTG- PT  Outcome: Ongoing (interventions implemented as appropriate)    11/13/17 1006   Transfer Training PT LTG   Transfer Training PT LTG, Date Established 11/13/17   Transfer Training PT LTG, Time to Achieve 2 wks   Transfer Training PT LTG, Activity Type sit to stand/stand to sit   Transfer Training PT LTG, [place] Level supervision required   Transfer Training PT LTG, Assist Device walker, rolling       Goal: Gait Training Goal LTG- PT  Outcome: Ongoing (interventions implemented as appropriate)    11/13/17 1006   Gait Training PT LTG   Gait Training Goal PT LTG, Date Established 11/13/17   Gait Training Goal PT LTG, Time to Achieve 2 wks   Gait Training Goal PT LTG, [place] Level supervision required   Gait Training Goal PT LTG, Assist Device walker, rolling   Gait Training Goal PT LTG, Distance to Achieve 200       Goal: Stair Training Goal LTG- PT  Outcome: Ongoing (interventions implemented as appropriate)    11/13/17  1006   Stair Training PT LTG   Stair Training Goal PT LTG, Date Established 11/13/17   Stair Training Goal PT LTG, Time to Achieve 2 wks   Stair Training Goal PT LTG, Number of Steps 2   Stair Training Goal PT LTG, [place] Level contact guard assist

## 2017-11-13 NOTE — PROGRESS NOTES
"Critical Care Note     LOS: 5 days   Patient Care Team:  No Known Provider as PCP - General    Chief Complaint/Reason for visit:  Sepsis, renal failure      Subjective     83-year-old woman, nonsmoker with hypertension, dyslipidemia, GERD hospitalized November 6 with watery diarrhea for hypotension. She had gram-negative miles bacteremia with Escherichia coli in her urine. She was transferred to the ICU on BiPAP with volume overload and hypotension on a dopamine drip. Deep line was placed and she was changed to norepinephrine. Oral midodrine was initiated and pressors weaned. Nephrology evaluated.  Interval History:   Yesterday she became extremely short of air with labored respirations and wheezing and crackles on examination. Nephrology ordered Bumex with adequate urine output. She also received a dose of Ativan 0.5 mg which helped with her anxiety. She could not tolerate BiPAP. This morning she continued to have some shortness of air but it was improved. However this afternoon she became dyspneic again. Bumex was again ordered and she needed a dose of Ativan.    Review of Systems:    All systems were reviewed and negative except as noted in subjective.    Medical history, surgical history, social history, family history reviewed    Objective     Intake/Output:    Intake/Output Summary (Last 24 hours) at 11/13/17 1531  Last data filed at 11/13/17 1000   Gross per 24 hour   Intake              285 ml   Output             2200 ml   Net            -1915 ml       Nutrition:  Diet Regular    Infusions:           Telemetry:  Sinus Rhythm: normal sinus rhythm          Vital Signs  Blood pressure 140/82, pulse 79, temperature (P) 98.8 °F (37.1 °C), resp. rate 20, height 63\" (160 cm), weight 166 lb 0.1 oz (75.3 kg), SpO2 96 %.    Physical Exam:  General Appearance:  Elderly white woman in mild respiratory distress    Head:  Normocephalic, atraumatic    Eyes:          Pupils equal reactive to light, no jaundice, conjunctiva " pink    Ears:     Throat: Oral mucosa moist    Neck: left IJ line. Trachea midline    Back:   Thoracic spine kyphosis    Lungs:   Breath sounds are bilateral, With diffuse expiratory wheezing and bibasilar crackles    Heart:  Regular, S1, S2 auscultated,    Abdomen:   Bowel sounds present, nondistended, soft    Rectal:   Deferred   Extremities:  edema of her upper extremities, Buttocks and thighs    Pulses: Pedal pulses present    Skin: Warm and dry    Lymph nodes:    Neurologic: Alert, oriented, cooperative, handgrip symmetric       Results Review:     I reviewed the patient's new clinical results.     Results from last 7 days  Lab Units 11/13/17  0732 11/12/17  0438 11/11/17  0355  11/09/17  0402   SODIUM mmol/L 143 141 136  < > 136   POTASSIUM mmol/L 3.5 3.4* 3.9  < > 3.5   CHLORIDE mmol/L 108 105 102  < > 102   CO2 mmol/L 22.0 23.0 21.0  < > 22.0   BUN mg/dL 64* 69* 61*  < > 70*   CREATININE mg/dL 4.40* 4.40* 4.70*  < > 4.60*   CALCIUM mg/dL 8.9 7.8* 7.7*  < > 6.7*   BILIRUBIN mg/dL  --   --   --   --  0.4   ALK PHOS U/L  --   --   --   --  67   ALT (SGPT) U/L  --   --   --   --  8   AST (SGOT) U/L  --   --   --   --  17   GLUCOSE mg/dL 101* 92 81  < > 112*   < > = values in this interval not displayed.    Results from last 7 days  Lab Units 11/13/17  0732 11/12/17  0438 11/11/17  0355   WBC 10*3/mm3 10.65 10.61 13.36*   HEMOGLOBIN g/dL 8.6* 8.2* 10.2*   HEMATOCRIT % 26.7* 24.8* 31.0*   PLATELETS 10*3/mm3 178 170 207       Results from last 7 days  Lab Units 11/12/17  1347   PH, ARTERIAL pH units 7.331*   PO2 ART mm Hg 77.3*   PCO2, ARTERIAL mm Hg 39.3   HCO3 ART mmol/L 20.8     Lab Results   Component Value Date    BLOODCX No growth at 4 days 11/08/2017     Lab Results   Component Value Date    URINECX 10,000-20,000 CFU/mL Escherichia coli (A) 11/08/2017   Escherichia coli is pansensitive  Stool for C. difficile toxin negative  Blood cx from [facility] are negative    I reviewed the patient's new imaging  including images and reports.          FINDINGS: Portable chest reveals low lung volumes. Small bilateral  pleural effusions with ill-defined opacification lung bases. The heart  is borderline large. Deep line catheter unchanged on the left.          IMPRESSION:  Stable chest as above.      D:  11/13/2017  All medications reviewed.     ceftriaxone 1 g Intravenous Q24H   famotidine 20 mg Oral Daily   ferric gluconate (FERRLECIT) IVPB 125 mg Intravenous Daily   heparin (porcine) 5,000 Units Subcutaneous Q8H   ipratropium-albuterol 3 mL Nebulization Q4H - RT         Assessment/Plan     Principal Problem:    Sepsis, probable source is urine  Active Problems:    UTI (urinary tract infection)    Acute renal failure (ARF)    Gastroenteritis    83-year-old woman who presented with sepsis, Escherichia coli urinary tract infection, diarrhea. Diarrhea resolved. Appetite is poor.  She was hypotensive and in acute renal failure. Serum creatinine remains significantly elevated at 4.4 but has not worsened.  I am hopeful this this is her peak Creatinine and it will start to decline. .  Now she is short of air with crackles, generalized edema suggestive of volume overload after receiving albumin, fluids. She received Bumex yesterday was 2700 ML's of urine and improved respiratory status. She then developed recurrent respiratory distress today and received 2 mg of Bumex, a second dose of Ativan with improvement..  EKG does not show any acute findings.  Troponin is 0.055 which is only mildly abnormal. CK is normal at 38. She remains afebrile on Rocephin.    PLAN:  BiPAP as needed  Incentive spirometry  Midodrine   Rocephin  IV fluids KVO  Bumex   Encourage by mouth intake  IV iron  Echocardiogram  Again discussed CODE STATUS, whether they would want intubation or dialysis. And they feel that if she has a good chance of improving then they might want temporary support.    VTE Prophylaxis: subcutaneous heparin    Stress Ulcer  Prophylaxis: none    Angeles Mcgill MD  11/13/17  3:31 PM      Time: 30min  I personally provided care to this critically ill patient as documented above.  Critical care time does not include time spent on separately billed procedures.  Non of my critical care time was concurrent with other critical care providers.

## 2017-11-13 NOTE — PROGRESS NOTES
Continued Stay Note   Cent[facility]     Patient Name: Jaclyn Garcia  MRN: [id number]  Today's Date: 11/13/2017    Admit Date: 11/8/2017          Discharge Plan       11/13/17 0939    Case Management/Social Work Plan    Plan Home    Patient/Family In Agreement With Plan yes    Additional Comments  The goal is to return home c assist from spouse and daughter.  May need HH to assist.  CM will follow.              Discharge Codes     None            Aung Watson RN

## 2017-11-13 NOTE — PLAN OF CARE
Problem: Patient Care Overview (Adult)  Goal: Adult Individualization and Mutuality  Outcome: Ongoing (interventions implemented as appropriate)  Goal: Discharge Needs Assessment  Outcome: Ongoing (interventions implemented as appropriate)    Problem: Renal Failure/Kidney Injury, Acute (Adult)  Goal: Signs and Symptoms of Listed Potential Problems Will be Absent or Manageable (Renal Failure/Kidney Injury, Acute)  Outcome: Ongoing (interventions implemented as appropriate)    Problem: Sepsis (Adult)  Goal: Signs and Symptoms of Listed Potential Problems Will be Absent or Manageable (Sepsis)  Outcome: Ongoing (interventions implemented as appropriate)    Problem: Pressure Ulcer Risk (Tal Scale) (Adult,Obstetrics,Pediatric)  Goal: Identify Related Risk Factors and Signs and Symptoms  Outcome: Ongoing (interventions implemented as appropriate)  Goal: Skin Integrity  Outcome: Ongoing (interventions implemented as appropriate)    Problem: Fluid Volume Excess (Adult,Obstetrics,Pediatric)  Goal: Identify Related Risk Factors and Signs and Symptoms  Outcome: Ongoing (interventions implemented as appropriate)  Goal: Stable Weight  Outcome: Ongoing (interventions implemented as appropriate)  Goal: Balanced Intake/Output  Outcome: Ongoing (interventions implemented as appropriate)    Problem: Fall Risk (Adult)  Goal: Identify Related Risk Factors and Signs and Symptoms  Outcome: Ongoing (interventions implemented as appropriate)  Goal: Absence of Falls  Outcome: Ongoing (interventions implemented as appropriate)

## 2017-11-13 NOTE — PROGRESS NOTES
Adult Nutrition  Assessment/PES    Patient Name:  Jaclyn Garcia  YOB: 1934  MRN: [id number]  Admit Date:  11/8/2017    Assessment Date:  11/13/2017    Comments:  Providing a variety of reduced volume supplements d/t SOA and difficulty eating. Will f/u on pt. Acceptance.          Reason for Assessment       11/13/17 1534    Reason for Assessment    Reason For Assessment/Visit multidisciplinary rounds;follow up protocol;identified at risk by screening criteria    Identified At Risk By Screening Criteria MST SCORE 2+    Time Spent (min) 30    Diagnosis --   per notes of this adm    Fluid Status Volume overload    Pulmonary/Critical Care Respiratory distress   pleural effusions              Nutrition/Diet History       11/13/17 1535    Nutrition/Diet History    Reported/Observed By RN    Appetite Poor   reports NO appetite    Mental State/Condition Unable to self feed;Other (comment)   difficulty eating d/t SOA              Labs/Tests/Procedures/Meds       11/13/17 1538    Labs/Tests/Procedures/Meds    Labs/Tests Review Reviewed     Results from last 7 days  Lab Units 11/13/17  0732  11/09/17  0402   SODIUM mmol/L 143  < > 136   POTASSIUM mmol/L 3.5  < > 3.5   CHLORIDE mmol/L 108  < > 102   CO2 mmol/L 22.0  < > 22.0   BUN mg/dL 64*  < > 70*   CREATININE mg/dL 4.40*  < > 4.60*   CALCIUM mg/dL 8.9  < > 6.7*   BILIRUBIN mg/dL  --   --  0.4   ALK PHOS U/L  --   --  67   ALT (SGPT) U/L  --   --  8   AST (SGOT) U/L  --   --  17   GLUCOSE mg/dL 101*  < > 112*   < > = values in this interval not displayed.             Physical Findings       11/13/17 1538    Physical Findings/Assessment    Additional Documentation Physical Appearance (Group)    Physical Appearance    Overall Physical Appearance on oxygen therapy;shortness of breath              Nutrition Prescription Ordered       11/13/17 1538    Nutrition Prescription PO    Current PO Diet Regular    Supplement Boost Breeze    Supplement Frequency 3 times a  day            Evaluation of Received Nutrient/Fluid Intake       11/13/17 1538    PO Evaluation    Number of Days PO Intake Evaluated 1 day    Number of Meals 2    % PO Intake bites; RN reports pt only able to take bites of food w/ assistance; drank about 25% of Breeze supplement            Problem/Interventions:        Problem 1       11/13/17 1540    Nutrition Diagnoses Problem 1    Problem 1 Inadequate Nutrient Intake    Etiology (related to) Factors Affecting Nutrition    Appetite Poor    Other SOA    Signs/Symptoms (evidenced by) Report of Mnimal PO Intake                    Intervention Goal       11/13/17 1541    Intervention Goal    General Nutrition support treatment    PO Tolerate PO;Increase intake            Nutrition Intervention       11/13/17 1541    Nutrition Intervention    RD/Tech Action Advise alternate selection;Menu provided;Menu adjusted;Interview for preference;Adjusted supplement;Recommend/ordered;Follow Tx progress;Care plan reviewd    Recommended/Ordered Supplement            Nutrition Prescription       11/13/17 1541    Nutrition Prescription PO    PO Prescription Begin/change supplement    Supplement Boost Pudding;Mighty Shake;Nepro   Will attempt to determine acceptable po supplement for pt w/ decreased volume d/t SOA and difficulty eating.    Supplement Frequency 3 times a day    New PO Prescription Ordered? Yes            Education/Evaluation       11/13/17 1543    Monitor/Evaluation    Monitor Per protocol;PO intake;Supplement intake;I&O;Pertinent labs;Symptoms        Electronically signed by:  Pau Bhatia RD  11/13/17 3:44 PM

## 2017-11-13 NOTE — THERAPY EVALUATION
Acute Care - Physical Therapy Initial Evaluation  [facility]     Patient Name: Jaclyn Garcia  : 1934  MRN: [id number]  Today's Date: 2017   Onset of Illness/Injury or Date of Surgery Date: 17  Date of Referral to PT: 11/10/17  Referring Physician: MD Romelia      Admit Date: 2017     Visit Dx:    ICD-10-CM ICD-9-CM   1. Sepsis due to Escherichia coli A41.51 038.42     995.91   2. Acute renal failure, unspecified acute renal failure type N17.9 584.9   3. Impaired functional mobility, balance, gait, and endurance Z74.09 V49.89     Patient Active Problem List   Diagnosis   • Sepsis, probable source is urine   • Acute renal failure (ARF)   • UTI (urinary tract infection)   • Gastroenteritis     Past Medical History:   Diagnosis Date   • Dyslipidemia    • GERD (gastroesophageal reflux disease)    • Hypertension      No past surgical history on file.       PT ASSESSMENT (last 72 hours)      PT Evaluation       17 0836 17 0458    Rehab Evaluation    Document Type evaluation  -LS     Subjective Information agree to therapy;no complaints  -LS     Patient Effort, Rehab Treatment good  -LS     Symptoms Noted During/After Treatment fatigue  -LS     General Information    Patient Profile Review yes  -LS     Onset of Illness/Injury or Date of Surgery Date 17  -LS     Referring Physician MD Romelia  -LS     Pertinent History Of Current Problem To OSH with diarrhea, weakness; found to have ELLE, UTI, septic shock. Transferred to [facility] for HLOC.   -LS     Precautions/Limitations fall precautions;oxygen therapy device and L/min  -LS     Prior Level of Function independent:;gait;transfer;ADL's;bathing;dressing  -LS     Equipment Currently Used at Home none    owns cane  -LS none  -AD    Plans/Goals Discussed With patient;agreed upon  -LS     Risks Reviewed patient:;LOB;dizziness;increased discomfort  -LS     Benefits Reviewed patient:;improve function;increase independence;increase  strength;increase knowledge  -LS     Barriers to Rehab none identified  -LS     Living Environment    Lives With spouse  -LS     Living Arrangements house  -LS     Home Accessibility stairs to enter home  -LS     Number of Stairs to Enter Home 2  -LS     Transportation Available  car;family or friend will provide  -AD    Clinical Impression    Date of Referral to PT 11/10/17  -LS     PT Diagnosis impaired functional mobility, balance, gait  -LS     Patient/Family Goals Statement return to OF  -LS     Criteria for Skilled Therapeutic Interventions Met yes;treatment indicated  -LS     Rehab Potential good, to achieve stated therapy goals  -LS     Vital Signs    Pre Systolic BP Rehab 125  -LS     Pre Treatment Diastolic BP 68  -LS     Post Systolic BP Rehab 158  -LS     Post Treatment Diastolic BP 83  -LS     Pretreatment Heart Rate (beats/min) 72  -LS     Posttreatment Heart Rate (beats/min) 104  -LS     Pre SpO2 (%) 97  -LS     O2 Delivery Pre Treatment supplemental O2  -LS     O2 Delivery Post Treatment supplemental O2  -LS     Pain Assessment    Pain Assessment 0-10  -LS     Pain Score 0  -LS     Post Pain Score 0  -LS     Cognitive Assessment/Intervention    Current Cognitive/Communication Assessment functional  -LS     Orientation Status oriented x 4  -LS     Follows Commands/Answers Questions able to follow single-step instructions;100% of the time;needs cueing  -LS     Personal Safety mild impairment;decreased awareness, need for safety  -LS     Personal Safety Interventions fall prevention program maintained;gait belt;nonskid shoes/slippers when out of bed  -LS     ROM (Range of Motion)    General ROM no range of motion deficits identified   BLEs  -LS     MMT (Manual Muscle Testing)    General MMT Assessment lower extremity strength deficits identified  -LS     Lower Extremity    Lower Ext Manual Muscle Testing Detail BLEs grossly 4-/5  -LS     Bed Mobility, Assessment/Treatment    Bed Mobility, Assistive  Device head of bed elevated;draw sheet  -LS     Bed Mob, Supine to Sit, [place] moderate assist (50% patient effort)  -LS     Bed Mob, Sit to Supine, [place] not appropriate to assess  -LS     Bed Mobility, Impairments strength decreased;impaired balance  -LS     Bed Mobility, Comment VC's for sequencing.  -LS     Transfer Assessment/Treatment    Transfers, Sit-Stand [place] minimum assist (75% patient effort);1 person + 1 person to manage equipment  -LS     Transfers, Stand-Sit [place] minimum assist (75% patient effort);1 person + 1 person to manage equipment  -LS     Transfers, Sit-Stand-Sit, Assist Device rolling walker  -LS     Transfer, Impairments strength decreased;impaired balance  -LS     Transfer, Comment VC's for hand placement.   -LS     Gait Assessment/Treatment    Gait, [place] Level minimum assist (75% patient effort);1 person + 1 person to manage equipment;verbal cues required  -LS     Gait, Assistive Device rolling walker  -LS     Gait, Distance (Feet) 120  -LS     Gait, Gait Deviations savanah decreased;step length decreased  -LS     Gait, Impairments impaired balance;strength decreased  -LS     Gait, Comment VC's for posture; 1 brief standing rest break due to fatigue.   -LS     Motor Skills/Interventions    Additional Documentation Balance Skills Training (Group)  -LS     Balance Skills Training    Sitting-Level of Assistance Contact guard  -LS     Sitting-Balance Support Feet supported  -LS     Standing-Level of Assistance Contact guard  -LS     Static Standing Balance Support assistive device  -LS     Gait Balance-Level of Assistance Minimum assistance  -LS     Gait Balance Support assistive device  -LS     Sensory Assessment/Intervention    Light Touch RLE;LLE  -LS     LLE Light Touch WNL  -LS     RLE Light Touch WNL  -LS     Positioning and Restraints    Pre-Treatment Position in bed  -LS     Post Treatment Position chair  -LS     In Chair notified nsg;reclined;call  light within reach;encouraged to call for assist;exit alarm on;with family/caregiver;with nsg;RUE elevated;LUE elevated;legs elevated  -LS       User Key  (r) = Recorded By, (t) = Taken By, (c) = Cosigned By    Initials Name Provider Type    LS Buffy Xiao, PT Physical Therapist    SIM Hu, RN Registered Nurse          Physical Therapy Education     Title: PT OT SLP Therapies (Active)     Topic: Physical Therapy (Active)     Point: Mobility training (Active)    Learning Progress Summary    Learner Readiness Method Response Comment Documented by Status   Patient Acceptance E,D NR  LS 11/13/17 1005 Active   Family Acceptance E,D NR  LS 11/13/17 1005 Active               Point: Body mechanics (Active)    Learning Progress Summary    Learner Readiness Method Response Comment Documented by Status   Patient Acceptance E,D NR  LS 11/13/17 1005 Active   Family Acceptance E,D NR  LS 11/13/17 1005 Active               Point: Precautions (Active)    Learning Progress Summary    Learner Readiness Method Response Comment Documented by Status   Patient Acceptance E,D NR  LS 11/13/17 1005 Active   Family Acceptance E,D NR  LS 11/13/17 1005 Active                      User Key     Initials Effective Dates Name Provider Type Discipline    LS 06/19/15 -  Buffy Xiao, PT Physical Therapist PT                PT Recommendation and Plan  Anticipated Discharge Disposition: skilled nursing facility  PT Frequency: daily  Plan of Care Review  Plan Of Care Reviewed With: patient  Outcome Summary/Follow up Plan: PT evaluation complete. Pt demonstrates generalized weakness and decreased indep re: functional mobility with evolving signs/symptoms, warranting further skilled PT services to promote PLOF. Limited today by fatigue but motivated to participate. Recommend SNF placement at d/c.           IP PT Goals       11/13/17 1006          Bed Mobility PT LTG    Bed Mobility PT LTG, Date Established 11/13/17  -LS      Bed Mobility PT  LTG, Time to Achieve 2 wks  -LS      Bed Mobility PT LTG, Activity Type supine to sit/sit to supine  -LS      Bed Mobility PT LTG, Clinch Level supervision required  -LS      Transfer Training PT LTG    Transfer Training PT LTG, Date Established 11/13/17  -LS      Transfer Training PT LTG, Time to Achieve 2 wks  -LS      Transfer Training PT LTG, Activity Type sit to stand/stand to sit  -LS      Transfer Training PT LTG, Clinch Level supervision required  -LS      Transfer Training PT LTG, Assist Device walker, rolling  -LS      Gait Training PT LTG    Gait Training Goal PT LTG, Date Established 11/13/17  -LS      Gait Training Goal PT LTG, Time to Achieve 2 wks  -LS      Gait Training Goal PT LTG, Clinch Level supervision required  -LS      Gait Training Goal PT LTG, Assist Device walker, rolling  -LS      Gait Training Goal PT LTG, Distance to Achieve 200  -LS      Stair Training PT LTG    Stair Training Goal PT LTG, Date Established 11/13/17  -LS      Stair Training Goal PT LTG, Time to Achieve 2 wks  -LS      Stair Training Goal PT LTG, Number of Steps 2  -LS      Stair Training Goal PT LTG, Clinch Level contact guard assist  -LS        User Key  (r) = Recorded By, (t) = Taken By, (c) = Cosigned By    Initials Name Provider Type    LS Buffy Xiao, PT Physical Therapist                Outcome Measures       11/13/17 0836          How much help from another person do you currently need...    Turning from your back to your side while in flat bed without using bedrails? 3  -LS      Moving from lying on back to sitting on the side of a flat bed without bedrails? 2  -LS      Moving to and from a bed to a chair (including a wheelchair)? 3  -LS      Standing up from a chair using your arms (e.g., wheelchair, bedside chair)? 3  -LS      Climbing 3-5 steps with a railing? 2  -LS      To walk in hospital room? 3  -LS      AM-PAC 6 Clicks Score 16  -LS      Functional Assessment    Outcome Measure  Options AM-PAC 6 Clicks Basic Mobility (PT)  -        User Key  (r) = Recorded By, (t) = Taken By, (c) = Cosigned By    Initials Name Provider Type    WILMER Xiao PT Physical Therapist           Time Calculation:         PT Charges       11/13/17 1008          Time Calculation    Start Time 0836  -LS      PT Received On 11/13/17  -      PT Goal Re-Cert Due Date 11/23/17  -        User Key  (r) = Recorded By, (t) = Taken By, (c) = Cosigned By    Initials Name Provider Type    WILMER Xiao PT Physical Therapist          Therapy Charges for Today     Code Description Service Date Service Provider Modifiers Qty    64039999264 HC PT EVAL MOD COMPLEXITY 4 11/13/2017 Buffy Xiao, PT GP 1    02097143970 HC PT THER SUPP EA 15 MIN 11/13/2017 Buffy Xiao, PT GP 2          PT G-Codes  Outcome Measure Options: AM-PAC 6 Clicks Basic Mobility (PT)      Buffy Xiao, PT  11/13/2017

## 2017-11-14 ENCOUNTER — APPOINTMENT (OUTPATIENT)
Dept: GENERAL RADIOLOGY | Facility: HOSPITAL | Age: 82
End: 2017-11-14

## 2017-11-14 LAB
ALBUMIN SERPL-MCNC: 3.3 G/DL (ref 3.2–4.8)
ANION GAP SERPL CALCULATED.3IONS-SCNC: 9 MMOL/L (ref 3–11)
BUN BLD-MCNC: 62 MG/DL (ref 9–23)
BUN/CREAT SERPL: 16.3 (ref 7–25)
CALCIUM SPEC-SCNC: 8.9 MG/DL (ref 8.7–10.4)
CHLORIDE SERPL-SCNC: 106 MMOL/L (ref 99–109)
CO2 SERPL-SCNC: 25 MMOL/L (ref 20–31)
CREAT BLD-MCNC: 3.8 MG/DL (ref 0.6–1.3)
GFR SERPL CREATININE-BSD FRML MDRD: 11 ML/MIN/1.73
GLUCOSE BLD-MCNC: 128 MG/DL (ref 70–100)
PHOSPHATE SERPL-MCNC: 5.2 MG/DL (ref 2.4–5.1)
POTASSIUM BLD-SCNC: 2.9 MMOL/L (ref 3.5–5.5)
POTASSIUM BLD-SCNC: 3.5 MMOL/L (ref 3.5–5.5)
SODIUM BLD-SCNC: 140 MMOL/L (ref 132–146)

## 2017-11-14 PROCEDURE — 71010 HC CHEST PA OR AP: CPT

## 2017-11-14 PROCEDURE — 94799 UNLISTED PULMONARY SVC/PX: CPT

## 2017-11-14 PROCEDURE — 97110 THERAPEUTIC EXERCISES: CPT

## 2017-11-14 PROCEDURE — 94760 N-INVAS EAR/PLS OXIMETRY 1: CPT

## 2017-11-14 PROCEDURE — 25010000002 HEPARIN (PORCINE) PER 1000 UNITS: Performed by: INTERNAL MEDICINE

## 2017-11-14 PROCEDURE — 99291 CRITICAL CARE FIRST HOUR: CPT | Performed by: INTERNAL MEDICINE

## 2017-11-14 PROCEDURE — 80069 RENAL FUNCTION PANEL: CPT | Performed by: INTERNAL MEDICINE

## 2017-11-14 PROCEDURE — 25010000002 CEFTRIAXONE PER 250 MG: Performed by: INTERNAL MEDICINE

## 2017-11-14 PROCEDURE — 25010000002 NA FERRIC GLUC CPLX PER 12.5 MG: Performed by: INTERNAL MEDICINE

## 2017-11-14 PROCEDURE — 94660 CPAP INITIATION&MGMT: CPT

## 2017-11-14 PROCEDURE — 97116 GAIT TRAINING THERAPY: CPT

## 2017-11-14 PROCEDURE — 84132 ASSAY OF SERUM POTASSIUM: CPT | Performed by: INTERNAL MEDICINE

## 2017-11-14 PROCEDURE — 94640 AIRWAY INHALATION TREATMENT: CPT

## 2017-11-14 RX ORDER — POTASSIUM CHLORIDE 750 MG/1
20 CAPSULE, EXTENDED RELEASE ORAL ONCE
Status: DISCONTINUED | OUTPATIENT
Start: 2017-11-14 | End: 2017-11-15

## 2017-11-14 RX ORDER — IPRATROPIUM BROMIDE AND ALBUTEROL SULFATE 2.5; .5 MG/3ML; MG/3ML
SOLUTION RESPIRATORY (INHALATION)
Status: DISPENSED
Start: 2017-11-14 | End: 2017-11-15

## 2017-11-14 RX ADMIN — IPRATROPIUM BROMIDE AND ALBUTEROL SULFATE 3 ML: .5; 3 SOLUTION RESPIRATORY (INHALATION) at 19:13

## 2017-11-14 RX ADMIN — FAMOTIDINE 20 MG: 20 TABLET, FILM COATED ORAL at 08:16

## 2017-11-14 RX ADMIN — SODIUM CHLORIDE 125 MG: 9 INJECTION, SOLUTION INTRAVENOUS at 08:16

## 2017-11-14 RX ADMIN — IPRATROPIUM BROMIDE AND ALBUTEROL SULFATE 3 ML: .5; 3 SOLUTION RESPIRATORY (INHALATION) at 23:45

## 2017-11-14 RX ADMIN — IPRATROPIUM BROMIDE AND ALBUTEROL SULFATE 3 ML: .5; 3 SOLUTION RESPIRATORY (INHALATION) at 03:29

## 2017-11-14 RX ADMIN — ALPRAZOLAM 0.25 MG: 0.25 TABLET ORAL at 07:18

## 2017-11-14 RX ADMIN — POTASSIUM CHLORIDE 40 MEQ: 750 CAPSULE, EXTENDED RELEASE ORAL at 17:23

## 2017-11-14 RX ADMIN — IPRATROPIUM BROMIDE AND ALBUTEROL SULFATE 3 ML: .5; 3 SOLUTION RESPIRATORY (INHALATION) at 11:34

## 2017-11-14 RX ADMIN — ALPRAZOLAM 0.25 MG: 0.25 TABLET ORAL at 17:23

## 2017-11-14 RX ADMIN — HEPARIN SODIUM 5000 UNITS: 5000 INJECTION, SOLUTION INTRAVENOUS; SUBCUTANEOUS at 21:06

## 2017-11-14 RX ADMIN — IPRATROPIUM BROMIDE AND ALBUTEROL SULFATE 3 ML: .5; 3 SOLUTION RESPIRATORY (INHALATION) at 07:41

## 2017-11-14 RX ADMIN — HEPARIN SODIUM 5000 UNITS: 5000 INJECTION, SOLUTION INTRAVENOUS; SUBCUTANEOUS at 14:07

## 2017-11-14 RX ADMIN — HEPARIN SODIUM 5000 UNITS: 5000 INJECTION, SOLUTION INTRAVENOUS; SUBCUTANEOUS at 06:23

## 2017-11-14 RX ADMIN — POTASSIUM CHLORIDE 40 MEQ: 750 CAPSULE, EXTENDED RELEASE ORAL at 06:47

## 2017-11-14 RX ADMIN — IPRATROPIUM BROMIDE AND ALBUTEROL SULFATE 3 ML: .5; 3 SOLUTION RESPIRATORY (INHALATION) at 15:55

## 2017-11-14 RX ADMIN — CEFTRIAXONE SODIUM 1 G: 1 INJECTION, SOLUTION INTRAVENOUS at 14:08

## 2017-11-14 RX ADMIN — POTASSIUM CHLORIDE 40 MEQ: 750 CAPSULE, EXTENDED RELEASE ORAL at 10:45

## 2017-11-14 NOTE — PROGRESS NOTES
Continued Stay Note  [facility]     Patient Name: Jaclyn Garcia  MRN: [id number]  Today's Date: 11/14/2017    Admit Date: 11/8/2017          Discharge Plan       11/14/17 1457    Case Management/Social Work Plan    Plan [facility]    Patient/Family In Agreement With Plan yes    Additional Comments Spoke c PT/OT team, and they suggest she transfer to [facility] for more therapy.  Talked to  and Mrs. Garcia and they are in agreement to transfer there if needed.  Referral given to Margy fuller [facility] to evaluate.               Discharge Codes     None        Expected Discharge Date and Time     Expected Discharge Date Expected Discharge Time    Nov 17, 2017             Aung Watson RN

## 2017-11-14 NOTE — PROGRESS NOTES
"Critical Care Note     LOS: 6 days   Patient Care Team:  No Known Provider as PCP - General    Chief Complaint/Reason for visit:  Sepsis, renal failure      Subjective     83-year-old woman, nonsmoker with hypertension, dyslipidemia, GERD hospitalized November 6 with watery diarrhea for hypotension. She had gram-negative miles bacteremia with Escherichia coli in her urine. She was transferred to the ICU on BiPAP with volume overload and hypotension on a dopamine drip. Deep line was placed and she was changed to norepinephrine. Oral midodrine was initiated and pressors weaned. Nephrology evaluated.  Interval History:   Yesterday she became extremely short of air with labored respirations and wheezing and crackles on examination. Nephrology ordered Bumex with adequate urine output. She also received a dose of Ativan 0.5 mg which helped with her anxiety. She could not tolerate BiPAP. This morning she continued to have some shortness of air but it was improved. However this afternoon she became dyspneic again. Bumex was again ordered and she needed a dose of Ativan.    Review of Systems:    All systems were reviewed and negative except as noted in subjective.    Medical history, surgical history, social history, family history reviewed    Objective     Intake/Output:    Intake/Output Summary (Last 24 hours) at 11/14/17 0854  Last data filed at 11/14/17 0600   Gross per 24 hour   Intake           246.98 ml   Output             2925 ml   Net         -2678.02 ml       Nutrition:  Diet Regular    Infusions:           Telemetry:  Sinus Rhythm: normal sinus rhythm          Vital Signs  Blood pressure 134/63, pulse 89, temperature 98.8 °F (37.1 °C), temperature source Axillary, resp. rate 26, height 63\" (160 cm), weight 166 lb 0.1 oz (75.3 kg), SpO2 100 %.    Physical Exam:  General Appearance:  Elderly white woman in mild respiratory distress    Head:  Normocephalic, atraumatic    Eyes:          Pupils equal reactive to light, " no jaundice, conjunctiva pink    Ears:     Throat: Oral mucosa moist    Neck: left IJ line. Trachea midline    Back:   Thoracic spine kyphosis    Lungs:   Breath sounds are bilateral, With diffuse expiratory wheezing and bibasilar crackles    Heart:  Regular, S1, S2 auscultated,    Abdomen:   Bowel sounds present, nondistended, soft    Rectal:   Deferred   Extremities:  edema of her upper extremities, Buttocks and thighs    Pulses: Pedal pulses present    Skin: Warm and dry    Lymph nodes:    Neurologic: Alert, oriented, cooperative, handgrip symmetric       Results Review:     I reviewed the patient's new clinical results.     Results from last 7 days  Lab Units 11/14/17  0427 11/13/17  0732 11/12/17  0438  11/09/17  0402   SODIUM mmol/L 140 143 141  < > 136   POTASSIUM mmol/L 2.9* 3.5 3.4*  < > 3.5   CHLORIDE mmol/L 106 108 105  < > 102   CO2 mmol/L 25.0 22.0 23.0  < > 22.0   BUN mg/dL 62* 64* 69*  < > 70*   CREATININE mg/dL 3.80* 4.40* 4.40*  < > 4.60*   CALCIUM mg/dL 8.9 8.9 7.8*  < > 6.7*   BILIRUBIN mg/dL  --   --   --   --  0.4   ALK PHOS U/L  --   --   --   --  67   ALT (SGPT) U/L  --   --   --   --  8   AST (SGOT) U/L  --   --   --   --  17   GLUCOSE mg/dL 128* 101* 92  < > 112*   < > = values in this interval not displayed.    Results from last 7 days  Lab Units 11/13/17  0732 11/12/17 0438 11/11/17  0355   WBC 10*3/mm3 10.65 10.61 13.36*   HEMOGLOBIN g/dL 8.6* 8.2* 10.2*   HEMATOCRIT % 26.7* 24.8* 31.0*   PLATELETS 10*3/mm3 178 170 207       Results from last 7 days  Lab Units 11/12/17  1347   PH, ARTERIAL pH units 7.331*   PO2 ART mm Hg 77.3*   PCO2, ARTERIAL mm Hg 39.3   HCO3 ART mmol/L 20.8     Lab Results   Component Value Date    BLOODCX No growth at 5 days 11/08/2017     Lab Results   Component Value Date    URINECX 10,000-20,000 CFU/mL Escherichia coli (A) 11/08/2017   Escherichia coli is pansensitive  Stool for C. difficile toxin negative  Blood cx from [place] are negative    I reviewed the  patient's new imaging including images and reports.          FINDINGS: Portable chest reveals low lung volumes. Small bilateral  pleural effusions with ill-defined opacification lung bases. The heart  is borderline large. Deep line catheter unchanged on the left.          IMPRESSION:  Stable chest as above.      D:  11/13/2017    Interpretation Summary      · Left atrial cavity size is borderline dilated.  · Mild to moderate tricuspid valve regurgitation is present.  · Calculated right ventricular systolic pressure from tricuspid regurgitation is 40 mmHg.  · Mild aortic valve regurgitation is present.  · Mild mitral valve regurgitation is present.  · Left ventricular systolic function is normal. Estimated EF = 65%.  · There is no evidence of pericardial effusion.  · Mild pulmonary hypertension is present.  · The aortic valve exhibits sclerosis.  · Normal right ventricular cavity size, wall thickness, systolic function and septal motion noted.  · Left ventricular diastolic function is normal.           All medications reviewed.     ceftriaxone 1 g Intravenous Q24H   famotidine 20 mg Oral Daily   ferric gluconate (FERRLECIT) IVPB 125 mg Intravenous Daily   heparin (porcine) 5,000 Units Subcutaneous Q8H   ipratropium-albuterol 3 mL Nebulization Q4H - RT         Assessment/Plan     Principal Problem:    Sepsis, probable source is urine  Active Problems:    UTI (urinary tract infection)    Acute renal failure (ARF)    Gastroenteritis    83-year-old woman who presented with sepsis, Escherichia coli urinary tract infection, diarrhea. Diarrhea resolved. Appetite is poor.  She was hypotensive and in acute renal failure. Serum creatinine finally starting to decline, now 3.8, peaked at 4.7.  She developed volume overload, responding nicely to Bumex. Edema is significantly improved but respiratory exam has persistent wheezing.  She has normal LVEF. She remains afebrile on Rocephin for the Escherichia coli in her urine. She may  also have an additional pneumonia.    PLAN:    Incentive spirometry  Midodrine? stop   Rocephin  Replace potassium  Bumex per nephrology  Recheck CXR  IV iron completed  Echocardiogram  Ativan PRN anxiety    VTE Prophylaxis: subcutaneous heparin    Stress Ulcer Prophylaxis: none    Angeles Mcgill MD  11/14/17  8:54 AM     Addendum: X-ray reveals decreased atelectasis at the bases. Persistent volume loss particularly in the left base but improved compared to yesterday. Effusions also decreased in size. No pulmonary edema.    Time: 30min  I personally provided care to this critically ill patient as documented above.  Critical care time does not include time spent on separately billed procedures.  Non of my critical care time was concurrent with other critical care providers.

## 2017-11-14 NOTE — PROGRESS NOTES
Adult Nutrition  Assessment/PES    Patient Name:  Jaclyn Garcia  YOB: 1934  MRN: [id number]  Admit Date:  11/8/2017    Assessment Date:  11/14/2017    Comments:  Will provide Mighty Shake supplement twice daily          Reason for Assessment       11/14/17 1604    Reason for Assessment    Reason For Assessment/Visit multidisciplinary rounds;follow up protocol    Identified At Risk By Screening Criteria MST SCORE 2+    Time Spent (min) 20    Diagnosis --   per notes of this adm              Nutrition/Diet History       11/14/17 1605    Nutrition/Diet History    Reported/Observed By RN;Patient    Appetite Improved    Other Pt eating and drinking better this am; resp staus improved.  Pt decided on mighty shake for supplement.              Labs/Tests/Procedures/Meds       11/14/17 1606    Labs/Tests/Procedures/Meds    Labs/Tests Review Reviewed     Results from last 7 days  Lab Units 11/14/17  0427  11/09/17  0402   SODIUM mmol/L 140  < > 136   POTASSIUM mmol/L 2.9*  < > 3.5   CHLORIDE mmol/L 106  < > 102   CO2 mmol/L 25.0  < > 22.0   BUN mg/dL 62*  < > 70*   CREATININE mg/dL 3.80*  < > 4.60*   CALCIUM mg/dL 8.9  < > 6.7*   BILIRUBIN mg/dL  --   --  0.4   ALK PHOS U/L  --   --  67   ALT (SGPT) U/L  --   --  8   AST (SGOT) U/L  --   --  17   GLUCOSE mg/dL 128*  < > 112*   < > = values in this interval not displayed.                 Nutrition Prescription Ordered       11/14/17 1606    Nutrition Prescription PO    Current PO Diet Regular    Supplement Boost Breeze    Supplement Frequency 3 times a day              Problem/Interventions:                  Intervention Goal       11/14/17 1607    Intervention Goal    General Nutrition support treatment            Nutrition Intervention       11/14/17 1607    Nutrition Intervention    RD/Tech Action Adjusted supplement            Nutrition Prescription       11/14/17 1607    Nutrition Prescription PO    PO Prescription Begin/change supplement    Supplement  Mighty Shake    Supplement Frequency 2 times a day    New PO Prescription Ordered? Yes   Pt agreeable to mighty shake supplement; alll other supplements dc'd            Education/Evaluation       11/14/17 5006    Monitor/Evaluation    Monitor Per protocol;PO intake;Supplement intake;Pertinent labs;Symptoms        Electronically signed by:  Pau Bhatia RD  11/14/17 4:08 PM

## 2017-11-14 NOTE — PLAN OF CARE
Problem: Patient Care Overview (Adult)  Goal: Plan of Care Review  Outcome: Ongoing (interventions implemented as appropriate)  Goal: Adult Individualization and Mutuality  Outcome: Ongoing (interventions implemented as appropriate)  Goal: Discharge Needs Assessment  Outcome: Ongoing (interventions implemented as appropriate)    Problem: Renal Failure/Kidney Injury, Acute (Adult)  Goal: Signs and Symptoms of Listed Potential Problems Will be Absent or Manageable (Renal Failure/Kidney Injury, Acute)  Outcome: Ongoing (interventions implemented as appropriate)    Problem: Sepsis (Adult)  Goal: Signs and Symptoms of Listed Potential Problems Will be Absent or Manageable (Sepsis)  Outcome: Ongoing (interventions implemented as appropriate)    Problem: Pressure Ulcer Risk (Tal Scale) (Adult,Obstetrics,Pediatric)  Goal: Identify Related Risk Factors and Signs and Symptoms  Outcome: Ongoing (interventions implemented as appropriate)  Goal: Skin Integrity  Outcome: Ongoing (interventions implemented as appropriate)    Problem: Fluid Volume Excess (Adult,Obstetrics,Pediatric)  Goal: Identify Related Risk Factors and Signs and Symptoms  Outcome: Ongoing (interventions implemented as appropriate)  Goal: Stable Weight  Outcome: Ongoing (interventions implemented as appropriate)  Goal: Balanced Intake/Output  Outcome: Ongoing (interventions implemented as appropriate)    Problem: Fall Risk (Adult)  Goal: Identify Related Risk Factors and Signs and Symptoms  Outcome: Ongoing (interventions implemented as appropriate)  Goal: Absence of Falls  Outcome: Ongoing (interventions implemented as appropriate)    Problem: NPPV/CPAP (Adult)  Goal: Signs and Symptoms of Listed Potential Problems Will be Absent or Manageable (NPPV/CPAP)  Outcome: Ongoing (interventions implemented as appropriate)

## 2017-11-14 NOTE — PROGRESS NOTES
"   LOS: 6 days    Patient Care Team:  No Known Provider as PCP - General    Reason For Visit:  F/U ELLE  Subjective           Review of Systems:    Pulm: No soa   CV:  No CP      Objective       ceftriaxone 1 g Intravenous Q24H   famotidine 20 mg Oral Daily   ferric gluconate (FERRLECIT) IVPB 125 mg Intravenous Daily   heparin (porcine) 5,000 Units Subcutaneous Q8H   ipratropium-albuterol 3 mL Nebulization Q4H - RT   potassium chloride 20 mEq Oral Once            Vital Signs:  Blood pressure 123/63, pulse 84, temperature 97.5 °F (36.4 °C), temperature source Oral, resp. rate 24, height 63\" (160 cm), weight 166 lb 0.1 oz (75.3 kg), SpO2 93 %.    Flowsheet Rows         First Filed Value    Admission Height  63\" (160 cm) Documented at 11/08/2017 2018    Admission Weight  166 lb 0.1 oz (75.3 kg) Documented at 11/08/2017 2018 11/13 0701 - 11/14 0700  In: 247 [I.V.:247]  Out: 2925 [Urine:2925]    Physical Exam:    General Appearance: NAD, alert and cooperative, Ox3  Eyes: PER, conjunctivae and sclerae normal, no icterus  Lungs: respirations regular and unlabored, no crepitus, clear to auscultation  Heart/CV: regular rhythm & normal rate, no murmur, no gallop, no rub and TR edema  Abdomen: not distended, soft, non-tender, no masses,  bowel sounds present  Skin: No rash, Warm and dry    Radiology:            Labs:    Results from last 7 days  Lab Units 11/13/17  0732 11/12/17  0438 11/11/17  0355   WBC 10*3/mm3 10.65 10.61 13.36*   HEMOGLOBIN g/dL 8.6* 8.2* 10.2*   HEMATOCRIT % 26.7* 24.8* 31.0*   PLATELETS 10*3/mm3 178 170 207       Results from last 7 days  Lab Units 11/14/17  0427 11/13/17  0732 11/12/17  0438 11/11/17  0355  11/10/17  0431  11/09/17  0402   SODIUM mmol/L 140 143 141 136  --  140  --  136   POTASSIUM mmol/L 2.9* 3.5 3.4* 3.9  < > 3.0*  < > 3.5   CHLORIDE mmol/L 106 108 105 102  --  112*  --  102   CO2 mmol/L 25.0 22.0 23.0 21.0  --  22.0  --  22.0   BUN mg/dL 62* 64* 69* 61*  --  61*  --  70* "   CREATININE mg/dL 3.80* 4.40* 4.40* 4.70*  --  3.80*  --  4.60*   CALCIUM mg/dL 8.9 8.9 7.8* 7.7*  --  5.4*  --  6.7*   PHOSPHORUS mg/dL 5.2* 6.0* 5.8* 6.2*  --  4.5  --  5.3*   MAGNESIUM mg/dL  --   --  2.7 2.7  --  1.5  --  1.9   ALBUMIN g/dL 3.30 3.50 3.40 2.70*  --   --   --  2.50*   < > = values in this interval not displayed.    Results from last 7 days  Lab Units 11/14/17  0427   GLUCOSE mg/dL 128*         Results from last 7 days  Lab Units 11/09/17  0402   ALK PHOS U/L 67   BILIRUBIN mg/dL 0.4   ALT (SGPT) U/L 8   AST (SGOT) U/L 17       Results from last 7 days  Lab Units 11/12/17  1347   PH, ARTERIAL pH units 7.331*   PO2 ART mm Hg 77.3*   PCO2, ARTERIAL mm Hg 39.3   HCO3 ART mmol/L 20.8         Results from last 7 days  Lab Units 11/08/17  2131   COLOR UA  Yellow   CLARITY UA  Cloudy*   PH, URINE  6.0   SPECIFIC GRAVITY, URINE  1.009   GLUCOSE UA  Negative   KETONES UA  Negative   BILIRUBIN UA  Negative   PROTEIN UA  30 mg/dL (1+)*   BLOOD UA  Large (3+)*   LEUKOCYTES UA  Moderate (2+)*   NITRITE UA  Negative       Estimated Creatinine Clearance: 10.9 mL/min (by C-G formula based on Cr of 3.8).      Assessment     Principal Problem:    Sepsis, probable source is urine  Active Problems:    Acute renal failure (ARF)    UTI (urinary tract infection)    Gastroenteritis            Impression: ELLE WITH IMPROVED GFR. HYPOKALEMIA THAT IS BEING SUPPLEMENTED.            Recommendations: LAB AM.      Orlando Melvin MD  11/14/17  11:06 AM

## 2017-11-14 NOTE — THERAPY TREATMENT NOTE
Acute Care - Physical Therapy Treatment Note  [facility]     Patient Name: Jaclyn Garcia  : 1934  MRN: [id number]  Today's Date: 2017  Onset of Illness/Injury or Date of Surgery Date: 17  Date of Referral to PT: 11/10/17  Referring Physician: MD Romelia    Admit Date: 2017    Visit Dx:    ICD-10-CM ICD-9-CM   1. Sepsis due to Escherichia coli A41.51 038.42     995.91   2. Acute renal failure, unspecified acute renal failure type N17.9 584.9   3. Impaired functional mobility, balance, gait, and endurance Z74.09 V49.89     Patient Active Problem List   Diagnosis   • Sepsis, probable source is urine   • Acute renal failure (ARF)   • UTI (urinary tract infection)   • Gastroenteritis               Adult Rehabilitation Note       17 1145          Rehab Assessment/Intervention    Discipline physical therapist  -LS      Document Type therapy note (daily note)  -LS      Subjective Information agree to therapy;complains of;weakness;fatigue  -LS      Patient Effort, Rehab Treatment good  -LS      Symptoms Noted During/After Treatment fatigue  -LS      Precautions/Limitations fall precautions;oxygen therapy device and L/min  -LS      Recorded by [LS] Buffy Xiao, PT      Vital Signs    Pre Systolic BP Rehab 153  -LS      Pre Treatment Diastolic BP 96  -LS      Post Systolic BP Rehab 167  -LS      Post Treatment Diastolic BP 85  -LS      Pretreatment Heart Rate (beats/min) 98  -LS      Posttreatment Heart Rate (beats/min) 95  -LS      Pre SpO2 (%) 99  -LS      O2 Delivery Pre Treatment supplemental O2  -LS      Post SpO2 (%) 98  -LS      O2 Delivery Post Treatment supplemental O2  -LS      Pre Patient Position Sitting  -LS      Intra Patient Position Standing  -LS      Post Patient Position Sitting  -LS      Recorded by [LS] Buffy Xiao, PT      Pain Assessment    Pain Assessment 0-10  -LS      Pain Score 0  -LS      Post Pain Score 0  -LS      Recorded by [LS] Buffy Xiao, PT       Cognitive Assessment/Intervention    Current Cognitive/Communication Assessment functional  -LS      Orientation Status oriented x 4;required verbal cueing (specifiy in comments)   cues for name of hospital  -LS      Follows Commands/Answers Questions able to follow single-step instructions;100% of the time;needs cueing  -LS      Personal Safety mild impairment;decreased awareness, need for safety;decreased insight to deficits  -LS      Personal Safety Interventions fall prevention program maintained;gait belt;nonskid shoes/slippers when out of bed  -LS      Recorded by [LS] Buffy Xiao, PT      Bed Mobility, Assessment/Treatment    Bed Mobility, Comment UIC  -LS      Recorded by [LS] Buffy Xiao, PT      Transfer Assessment/Treatment    Transfers, Sit-Stand [place] verbal cues required;moderate assist (50% patient effort);2 person assist required  -LS      Transfers, Stand-Sit [place] verbal cues required;moderate assist (50% patient effort);2 person assist required  -LS      Transfers, Sit-Stand-Sit, Assist Device rolling walker  -LS      Transfer, Impairments impaired balance;strength decreased  -LS      Transfer, Comment VC's for hand placement and upright posture.   -LS      Recorded by [LS] Buffy Xiao, PT      Gait Assessment/Treatment    Gait, [place] Level minimum assist (75% patient effort);1 person + 1 person to manage equipment;verbal cues required  -LS      Gait, Assistive Device rolling walker  -LS      Gait, Distance (Feet) 70  -LS      Gait, Gait Deviations savanah decreased;decreased heel strike;step length decreased  -LS      Gait, Comment VC's for upright posture, increasing step length and maintaining straight pathway in hallway.  -LS      Recorded by [LS] Buffy Xiao, PT      Motor Skills/Interventions    Additional Documentation Balance Skills Training (Group)  -LS      Recorded by [LS] Buffy Xiao, PT      Balance Skills Training    Sitting-Level of Assistance Contact  guard  -LS      Sitting-Balance Support Feet supported  -LS      Standing-Level of Assistance Contact guard  -LS      Static Standing Balance Support assistive device  -LS      Gait Balance-Level of Assistance Minimum assistance  -LS      Gait Balance Support assistive device  -LS      Recorded by [LS] Buffy Xiao, PT      Therapy Exercises    Bilateral Lower Extremities AROM:;10 reps;sitting;ankle pumps/circles;quad sets;LAQ  -LS      Recorded by [LS] Buffy Xiao, PT      Positioning and Restraints    Pre-Treatment Position sitting in chair/recliner  -LS      Post Treatment Position chair  -LS      In Chair notified nsg;reclined;call light within reach;encouraged to call for assist;with family/caregiver;RUE elevated;LUE elevated;legs elevated;heels elevated  -LS      Recorded by [LS] Buffy Xiao, PT        User Key  (r) = Recorded By, (t) = Taken By, (c) = Cosigned By    Initials Name Effective Dates    LS Buffy Xiao, PT 06/19/15 -                 IP PT Goals       11/14/17 1213 11/13/17 1006       Bed Mobility PT LTG    Bed Mobility PT LTG, Date Established  11/13/17  -LS     Bed Mobility PT LTG, Time to Achieve  2 wks  -LS     Bed Mobility PT LTG, Activity Type  supine to sit/sit to supine  -LS     Bed Mobility PT LTG, [place] Level  supervision required  -LS     Bed Mobility PT LTG, Outcome goal ongoing  -LS      Transfer Training PT LTG    Transfer Training PT LTG, Date Established  11/13/17  -LS     Transfer Training PT LTG, Time to Achieve  2 wks  -LS     Transfer Training PT LTG, Activity Type  sit to stand/stand to sit  -LS     Transfer Training PT LTG, [place] Level  supervision required  -LS     Transfer Training PT LTG, Assist Device  walker, rolling  -LS     Transfer Training PT LTG, Outcome goal ongoing  -LS      Gait Training PT LTG    Gait Training Goal PT LTG, Date Established  11/13/17  -LS     Gait Training Goal PT LTG, Time to Achieve  2 wks  -LS     Gait Training Goal PT LTG,  [place] Level  supervision required  -LS     Gait Training Goal PT LTG, Assist Device  walker, rolling  -LS     Gait Training Goal PT LTG, Distance to Achieve  200  -LS     Gait Training Goal PT LTG, Outcome goal ongoing  -LS      Stair Training PT LTG    Stair Training Goal PT LTG, Date Established  11/13/17  -LS     Stair Training Goal PT LTG, Time to Achieve  2 wks  -LS     Stair Training Goal PT LTG, Number of Steps  2  -LS     Stair Training Goal PT LTG, [place] Level  contact guard assist  -LS     Stair Training Goal PT LTG, Outcome goal ongoing  -LS        User Key  (r) = Recorded By, (t) = Taken By, (c) = Cosigned By    Initials Name Provider Type    LS Buffy Xiao, PT Physical Therapist          Physical Therapy Education     Title: PT OT SLP Therapies (Active)     Topic: Physical Therapy (Active)     Point: Mobility training (Active)    Learning Progress Summary    Learner Readiness Method Response Comment Documented by Status   Patient Acceptance E,D NR   11/14/17 1213 Active    Acceptance E VU,NR  AC 11/14/17 0414 Done    Acceptance E,D NR   11/13/17 1005 Active   Family Acceptance E VU,NR  AC 11/14/17 0414 Done    Acceptance E,D NR   11/13/17 1005 Active   Significant Other Acceptance E,D NR   11/14/17 1213 Active               Point: Home exercise program (Active)    Learning Progress Summary    Learner Readiness Method Response Comment Documented by Status   Patient Acceptance E,D NR   11/14/17 1213 Active    Acceptance E VU,NR  AC 11/14/17 0414 Done   Family Acceptance E VU,NR  AC 11/14/17 0414 Done   Significant Other Acceptance E,D NR   11/14/17 1213 Active               Point: Body mechanics (Active)    Learning Progress Summary    Learner Readiness Method Response Comment Documented by Status   Patient Acceptance E,D NR   11/14/17 1213 Active    Acceptance E VU,NR  AC 11/14/17 0414 Done    Acceptance E,D NR   11/13/17 1005 Active   Family Acceptance E VU,NR  AC  11/14/17 0414 Done    Acceptance E,D NR  LS 11/13/17 1005 Active   Significant Other Acceptance E,D NR  LS 11/14/17 1213 Active               Point: Precautions (Active)    Learning Progress Summary    Learner Readiness Method Response Comment Documented by Status   Patient Acceptance E,D NR  LS 11/14/17 1213 Active    Acceptance E VU,NR  AC 11/14/17 0414 Done    Acceptance E,D NR  LS 11/13/17 1005 Active   Family Acceptance E VU,NR  AC 11/14/17 0414 Done    Acceptance E,D NR  LS 11/13/17 1005 Active   Significant Other Acceptance E,D NR  LS 11/14/17 1213 Active                      User Key     Initials Effective Dates Name Provider Type Discipline     06/19/15 -  Buffy Xiao, PT Physical Therapist PT     07/03/17 -  Claudia Dietrich, RN Registered Nurse Nurse                    PT Recommendation and Plan  Anticipated Discharge Disposition: skilled nursing facility  PT Frequency: daily  Plan of Care Review  Plan Of Care Reviewed With: patient, spouse  Progress: progress toward functional goals is gradual  Outcome Summary/Follow up Plan: Pt required increased assist for STS transfer today; gait distance limited by fatigue. Pt gave good effort with seated ther ex and mobility despite fatigue. Will cont to progress as clinically warranted.           Outcome Measures       11/14/17 1145 11/13/17 0836       How much help from another person do you currently need...    Turning from your back to your side while in flat bed without using bedrails? 3  -LS 3  -LS     Moving from lying on back to sitting on the side of a flat bed without bedrails? 2  -LS 2  -LS     Moving to and from a bed to a chair (including a wheelchair)? 3  -LS 3  -LS     Standing up from a chair using your arms (e.g., wheelchair, bedside chair)? 2  -LS 3  -LS     Climbing 3-5 steps with a railing? 1  -LS 2  -LS     To walk in hospital room? 3  -LS 3  -LS     AM-PAC 6 Clicks Score 14  -LS 16  -LS     Functional Assessment    Outcome Measure Options  AM-PAC 6 Clicks Basic Mobility (PT)  -LS AM-PAC 6 Clicks Basic Mobility (PT)  -LS       User Key  (r) = Recorded By, (t) = Taken By, (c) = Cosigned By    Initials Name Provider Type    WILMER Xiao PT Physical Therapist           Time Calculation:         PT Charges       11/14/17 1215          Time Calculation    Start Time 1145  -LS      PT Received On 11/14/17  -LS      PT Goal Re-Cert Due Date 11/23/17  -LS      Time Calculation- PT    Total Timed Code Minutes- PT 23 minute(s)  -LS        User Key  (r) = Recorded By, (t) = Taken By, (c) = Cosigned By    Initials Name Provider Type    WILMER Xiao PT Physical Therapist          Therapy Charges for Today     Code Description Service Date Service Provider Modifiers Qty    85063725595 HC PT EVAL MOD COMPLEXITY 4 11/13/2017 Buffy Xiao, PT GP 1    35321392285 HC PT THER SUPP EA 15 MIN 11/13/2017 Buffy Xiao, PT GP 2    79844721388 HC GAIT TRAINING EA 15 MIN 11/14/2017 Buffy Xiao, PT GP 1    09350321191 HC PT THER PROC EA 15 MIN 11/14/2017 Buffy Xiao, PT GP 1    30015219059 HC PT THER SUPP EA 15 MIN 11/14/2017 Buffy Xiao, PT GP 2          PT G-Codes  Outcome Measure Options: AM-PAC 6 Clicks Basic Mobility (PT)    Buffy Xiao, PT  11/14/2017

## 2017-11-14 NOTE — PLAN OF CARE
Problem: Patient Care Overview (Adult)  Goal: Plan of Care Review  Outcome: Ongoing (interventions implemented as appropriate)    11/14/17 1213   Coping/Psychosocial Response Interventions   Plan Of Care Reviewed With patient;spouse   Patient Care Overview   Progress progress toward functional goals is gradual   Outcome Evaluation   Outcome Summary/Follow up Plan Pt required increased assist for STS transfer today; gait distance limited by fatigue. Pt gave good effort with seated ther ex and mobility despite fatigue. Will cont to progress as clinically warranted.          Problem: Inpatient Physical Therapy  Goal: Bed Mobility Goal LTG- PT  Outcome: Ongoing (interventions implemented as appropriate)    11/13/17 1006 11/14/17 1213   Bed Mobility PT LTG   Bed Mobility PT LTG, Date Established 11/13/17 --    Bed Mobility PT LTG, Time to Achieve 2 wks --    Bed Mobility PT LTG, Activity Type supine to sit/sit to supine --    Bed Mobility PT LTG, [place] Level supervision required --    Bed Mobility PT LTG, Outcome --  goal ongoing       Goal: Transfer Training Goal 1 LTG- PT  Outcome: Ongoing (interventions implemented as appropriate)    11/13/17 1006 11/14/17 1213   Transfer Training PT LTG   Transfer Training PT LTG, Date Established 11/13/17 --    Transfer Training PT LTG, Time to Achieve 2 wks --    Transfer Training PT LTG, Activity Type sit to stand/stand to sit --    Transfer Training PT LTG, [place] Level supervision required --    Transfer Training PT LTG, Assist Device walker, rolling --    Transfer Training PT LTG, Outcome --  goal ongoing       Goal: Gait Training Goal LTG- PT  Outcome: Ongoing (interventions implemented as appropriate)    11/13/17 1006 11/14/17 1213   Gait Training PT LTG   Gait Training Goal PT LTG, Date Established 11/13/17 --    Gait Training Goal PT LTG, Time to Achieve 2 wks --    Gait Training Goal PT LTG, [place] Level supervision required --    Gait Training Goal PT  LTG, Assist Device walker, rolling --    Gait Training Goal PT LTG, Distance to Achieve 200 --    Gait Training Goal PT LTG, Outcome --  goal ongoing       Goal: Stair Training Goal LTG- PT  Outcome: Ongoing (interventions implemented as appropriate)    11/13/17 1006 11/14/17 1213   Stair Training PT LTG   Stair Training Goal PT LTG, Date Established 11/13/17 --    Stair Training Goal PT LTG, Time to Achieve 2 wks --    Stair Training Goal PT LTG, Number of Steps 2 --    Stair Training Goal PT LTG, [place] Level contact guard assist --    Stair Training Goal PT LTG, Outcome --  goal ongoing

## 2017-11-14 NOTE — PLAN OF CARE
Problem: Patient Care Overview (Adult)  Goal: Plan of Care Review  Outcome: Ongoing (interventions implemented as appropriate)    11/14/17 1800 11/14/17 1853   Coping/Psychosocial Response Interventions   Plan Of Care Reviewed With patient --    Patient Care Overview   Progress --  improving   Outcome Evaluation   Outcome Summary/Follow up Plan --  LUKE omer discontinued, awaiting transfer to [facility]         Problem: Renal Failure/Kidney Injury, Acute (Adult)  Goal: Signs and Symptoms of Listed Potential Problems Will be Absent or Manageable (Renal Failure/Kidney Injury, Acute)  Outcome: Ongoing (interventions implemented as appropriate)    Problem: Sepsis (Adult)  Goal: Signs and Symptoms of Listed Potential Problems Will be Absent or Manageable (Sepsis)  Outcome: Ongoing (interventions implemented as appropriate)    Problem: Pressure Ulcer Risk (Tal Scale) (Adult,Obstetrics,Pediatric)  Goal: Identify Related Risk Factors and Signs and Symptoms  Outcome: Ongoing (interventions implemented as appropriate)  Goal: Skin Integrity  Outcome: Ongoing (interventions implemented as appropriate)    Problem: Fluid Volume Excess (Adult,Obstetrics,Pediatric)  Goal: Identify Related Risk Factors and Signs and Symptoms  Outcome: Ongoing (interventions implemented as appropriate)  Goal: Stable Weight  Outcome: Ongoing (interventions implemented as appropriate)  Goal: Balanced Intake/Output  Outcome: Ongoing (interventions implemented as appropriate)    Problem: Fall Risk (Adult)  Goal: Identify Related Risk Factors and Signs and Symptoms  Outcome: Ongoing (interventions implemented as appropriate)  Goal: Absence of Falls  Outcome: Ongoing (interventions implemented as appropriate)

## 2017-11-15 LAB
ALBUMIN SERPL-MCNC: 3.3 G/DL (ref 3.2–4.8)
ANION GAP SERPL CALCULATED.3IONS-SCNC: 11 MMOL/L (ref 3–11)
BASOPHILS # BLD MANUAL: 0.11 10*3/MM3 (ref 0–0.2)
BASOPHILS NFR BLD AUTO: 1 % (ref 0–1)
BUN BLD-MCNC: 49 MG/DL (ref 9–23)
BUN/CREAT SERPL: 15.8 (ref 7–25)
CALCIUM SPEC-SCNC: 8.7 MG/DL (ref 8.7–10.4)
CHLORIDE SERPL-SCNC: 108 MMOL/L (ref 99–109)
CO2 SERPL-SCNC: 24 MMOL/L (ref 20–31)
CREAT BLD-MCNC: 3.1 MG/DL (ref 0.6–1.3)
DEPRECATED RDW RBC AUTO: 53.6 FL (ref 37–54)
EOSINOPHIL # BLD MANUAL: 0.65 10*3/MM3 (ref 0.1–0.3)
EOSINOPHIL NFR BLD MANUAL: 6 % (ref 0–3)
ERYTHROCYTE [DISTWIDTH] IN BLOOD BY AUTOMATED COUNT: 15.1 % (ref 11.3–14.5)
GFR SERPL CREATININE-BSD FRML MDRD: 14 ML/MIN/1.73
GLUCOSE BLD-MCNC: 106 MG/DL (ref 70–100)
HCT VFR BLD AUTO: 27.6 % (ref 34.5–44)
HGB BLD-MCNC: 9 G/DL (ref 11.5–15.5)
LYMPHOCYTES # BLD MANUAL: 1.31 10*3/MM3 (ref 0.6–4.8)
LYMPHOCYTES NFR BLD MANUAL: 12 % (ref 24–44)
LYMPHOCYTES NFR BLD MANUAL: 3 % (ref 0–12)
MAGNESIUM SERPL-MCNC: 2 MG/DL (ref 1.3–2.7)
MCH RBC QN AUTO: 31.7 PG (ref 27–31)
MCHC RBC AUTO-ENTMCNC: 32.6 G/DL (ref 32–36)
MCV RBC AUTO: 97.2 FL (ref 80–99)
METAMYELOCYTES NFR BLD MANUAL: 3 % (ref 0–0)
MONOCYTES # BLD AUTO: 0.33 10*3/MM3 (ref 0–1)
NEUTROPHILS # BLD AUTO: 8.18 10*3/MM3 (ref 1.5–8.3)
NEUTROPHILS NFR BLD MANUAL: 70 % (ref 41–71)
NEUTS BAND NFR BLD MANUAL: 5 % (ref 0–5)
PHOSPHATE SERPL-MCNC: 4.2 MG/DL (ref 2.4–5.1)
PLAT MORPH BLD: NORMAL
PLATELET # BLD AUTO: 217 10*3/MM3 (ref 150–450)
PMV BLD AUTO: 9.7 FL (ref 6–12)
POTASSIUM BLD-SCNC: 3.5 MMOL/L (ref 3.5–5.5)
RBC # BLD AUTO: 2.84 10*6/MM3 (ref 3.89–5.14)
RBC MORPH BLD: NORMAL
SODIUM BLD-SCNC: 143 MMOL/L (ref 132–146)
WBC MORPH BLD: NORMAL
WBC NRBC COR # BLD: 10.9 10*3/MM3 (ref 3.5–10.8)

## 2017-11-15 PROCEDURE — 94799 UNLISTED PULMONARY SVC/PX: CPT

## 2017-11-15 PROCEDURE — 80069 RENAL FUNCTION PANEL: CPT | Performed by: INTERNAL MEDICINE

## 2017-11-15 PROCEDURE — 94640 AIRWAY INHALATION TREATMENT: CPT

## 2017-11-15 PROCEDURE — 97116 GAIT TRAINING THERAPY: CPT

## 2017-11-15 PROCEDURE — 85025 COMPLETE CBC W/AUTO DIFF WBC: CPT | Performed by: INTERNAL MEDICINE

## 2017-11-15 PROCEDURE — 85007 BL SMEAR W/DIFF WBC COUNT: CPT | Performed by: INTERNAL MEDICINE

## 2017-11-15 PROCEDURE — 99233 SBSQ HOSP IP/OBS HIGH 50: CPT | Performed by: INTERNAL MEDICINE

## 2017-11-15 PROCEDURE — 25010000002 HEPARIN (PORCINE) PER 1000 UNITS: Performed by: INTERNAL MEDICINE

## 2017-11-15 PROCEDURE — 25010000002 NA FERRIC GLUC CPLX PER 12.5 MG: Performed by: INTERNAL MEDICINE

## 2017-11-15 PROCEDURE — 94760 N-INVAS EAR/PLS OXIMETRY 1: CPT

## 2017-11-15 PROCEDURE — 97110 THERAPEUTIC EXERCISES: CPT

## 2017-11-15 PROCEDURE — 25010000002 CEFTRIAXONE PER 250 MG: Performed by: INTERNAL MEDICINE

## 2017-11-15 PROCEDURE — 83735 ASSAY OF MAGNESIUM: CPT | Performed by: INTERNAL MEDICINE

## 2017-11-15 RX ORDER — IPRATROPIUM BROMIDE AND ALBUTEROL SULFATE 2.5; .5 MG/3ML; MG/3ML
SOLUTION RESPIRATORY (INHALATION)
Status: COMPLETED
Start: 2017-11-15 | End: 2017-11-15

## 2017-11-15 RX ORDER — IPRATROPIUM BROMIDE AND ALBUTEROL SULFATE 2.5; .5 MG/3ML; MG/3ML
3 SOLUTION RESPIRATORY (INHALATION)
Status: DISCONTINUED | OUTPATIENT
Start: 2017-11-15 | End: 2017-11-17

## 2017-11-15 RX ORDER — ALPRAZOLAM 0.25 MG/1
TABLET ORAL
Status: COMPLETED
Start: 2017-11-15 | End: 2017-11-15

## 2017-11-15 RX ORDER — BUMETANIDE 0.25 MG/ML
1 INJECTION INTRAMUSCULAR; INTRAVENOUS ONCE
Status: COMPLETED | OUTPATIENT
Start: 2017-11-15 | End: 2017-11-15

## 2017-11-15 RX ADMIN — ALPRAZOLAM 0.25 MG: 0.25 TABLET ORAL at 11:14

## 2017-11-15 RX ADMIN — IPRATROPIUM BROMIDE AND ALBUTEROL SULFATE: .5; 3 SOLUTION RESPIRATORY (INHALATION) at 03:12

## 2017-11-15 RX ADMIN — ALPRAZOLAM 0.25 MG: 0.25 TABLET ORAL at 04:35

## 2017-11-15 RX ADMIN — HEPARIN SODIUM 5000 UNITS: 5000 INJECTION, SOLUTION INTRAVENOUS; SUBCUTANEOUS at 06:16

## 2017-11-15 RX ADMIN — FAMOTIDINE 20 MG: 20 TABLET, FILM COATED ORAL at 11:11

## 2017-11-15 RX ADMIN — HEPARIN SODIUM 5000 UNITS: 5000 INJECTION, SOLUTION INTRAVENOUS; SUBCUTANEOUS at 13:01

## 2017-11-15 RX ADMIN — BUMETANIDE 1 MG: 0.25 INJECTION INTRAMUSCULAR; INTRAVENOUS at 12:15

## 2017-11-15 RX ADMIN — Medication 5 MG: at 22:21

## 2017-11-15 RX ADMIN — IPRATROPIUM BROMIDE AND ALBUTEROL SULFATE 3 ML: .5; 3 SOLUTION RESPIRATORY (INHALATION) at 08:59

## 2017-11-15 RX ADMIN — HEPARIN SODIUM 5000 UNITS: 5000 INJECTION, SOLUTION INTRAVENOUS; SUBCUTANEOUS at 21:08

## 2017-11-15 RX ADMIN — IPRATROPIUM BROMIDE AND ALBUTEROL SULFATE 3 ML: .5; 3 SOLUTION RESPIRATORY (INHALATION) at 12:40

## 2017-11-15 RX ADMIN — ACETAMINOPHEN 650 MG: 325 TABLET ORAL at 08:13

## 2017-11-15 RX ADMIN — CEFTRIAXONE SODIUM 1 G: 1 INJECTION, SOLUTION INTRAVENOUS at 13:01

## 2017-11-15 RX ADMIN — GUAIFENESIN 200 MG: 100 SOLUTION ORAL at 06:18

## 2017-11-15 RX ADMIN — SODIUM CHLORIDE 125 MG: 9 INJECTION, SOLUTION INTRAVENOUS at 08:13

## 2017-11-15 RX ADMIN — ALPRAZOLAM 0.25 MG: 0.25 TABLET ORAL at 21:08

## 2017-11-15 RX ADMIN — IPRATROPIUM BROMIDE AND ALBUTEROL SULFATE 3 ML: .5; 3 SOLUTION RESPIRATORY (INHALATION) at 16:06

## 2017-11-15 NOTE — THERAPY TREATMENT NOTE
Acute Care - Physical Therapy Treatment Note  [facility]     Patient Name: Jaclyn Garcia  : 1934  MRN: [id number]  Today's Date: 11/15/2017  Onset of Illness/Injury or Date of Surgery Date: 17  Date of Referral to PT: 11/10/17  Referring Physician: MD Romelia    Admit Date: 2017    Visit Dx:    ICD-10-CM ICD-9-CM   1. Sepsis due to Escherichia coli A41.51 038.42     995.91   2. Acute renal failure, unspecified acute renal failure type N17.9 584.9   3. Impaired functional mobility, balance, gait, and endurance Z74.09 V49.89     Patient Active Problem List   Diagnosis   • Sepsis, probable source is urine   • Acute renal failure (ARF)   • UTI (urinary tract infection)   • Gastroenteritis               Adult Rehabilitation Note       11/15/17 0815 17 1145       Rehab Assessment/Intervention    Discipline physical therapist  -LS physical therapist  -LS     Document Type therapy note (daily note)  -LS therapy note (daily note)  -LS     Subjective Information agree to therapy;complains of;weakness;fatigue  -LS agree to therapy;complains of;weakness;fatigue  -LS     Patient Effort, Rehab Treatment good  -LS good  -LS     Symptoms Noted During/After Treatment fatigue  -LS fatigue  -LS     Precautions/Limitations fall precautions;oxygen therapy device and L/min  -LS fall precautions;oxygen therapy device and L/min  -LS     Recorded by [LS] Buffy Xiao, PT [LS] Buffy Xiao, PT     Vital Signs    Pre Systolic BP Rehab 163  -  -LS     Pre Treatment Diastolic BP 92  -LS 96  -LS     Post Systolic BP Rehab 155  -  -LS     Post Treatment Diastolic BP 94  -LS 85  -LS     Pretreatment Heart Rate (beats/min) 105  -LS 98  -LS     Posttreatment Heart Rate (beats/min) 107  -LS 95  -LS     Pre SpO2 (%) 100  -LS 99  -LS     O2 Delivery Pre Treatment supplemental O2  -LS supplemental O2  -LS     Post SpO2 (%) 100  -LS 98  -LS     O2 Delivery Post Treatment supplemental O2  -LS supplemental O2   -LS     Pre Patient Position Sitting  -LS Sitting  -LS     Intra Patient Position Standing  -LS Standing  -LS     Post Patient Position Sitting  -LS Sitting  -LS     Recorded by [LS] Buffy Xiao, PT [LS] Buffy Xiao, PT     Pain Assessment    Pain Assessment 0-10  -LS 0-10  -LS     Pain Score 0  -LS 0  -LS     Post Pain Score 0  -LS 0  -LS     Recorded by [LS] Buffy Xiao, PT [LS] Buffy Xiao PT     Cognitive Assessment/Intervention    Current Cognitive/Communication Assessment functional  -LS functional  -LS     Orientation Status oriented x 4  -LS oriented x 4;required verbal cueing (specifiy in comments)   cues for name of hospital  -LS     Follows Commands/Answers Questions able to follow single-step instructions;100% of the time;needs cueing;needs increased time;needs repetition  -LS able to follow single-step instructions;100% of the time;needs cueing  -LS     Personal Safety mild impairment;decreased awareness, need for assist;decreased awareness, need for safety  -LS mild impairment;decreased awareness, need for safety;decreased insight to deficits  -LS     Personal Safety Interventions fall prevention program maintained;gait belt;nonskid shoes/slippers when out of bed  -LS fall prevention program maintained;gait belt;nonskid shoes/slippers when out of bed  -LS     Recorded by [LS] Buffy Xiao, PT [LS] Buffy Xiao PT     Bed Mobility, Assessment/Treatment    Bed Mobility, Comment Sitting UIC upon arrival.   -LS UIC  -LS     Recorded by [LS] Buffy Xiao, PT [LS] Buffy Xiao, PT     Transfer Assessment/Treatment    Transfers, Sit-Stand [place] minimum assist (75% patient effort);2 person assist required;verbal cues required  -LS verbal cues required;moderate assist (50% patient effort);2 person assist required  -LS     Transfers, Stand-Sit [place] minimum assist (75% patient effort);2 person assist required;verbal cues required  -LS verbal cues required;moderate assist (50% patient  effort);2 person assist required  -LS     Transfers, Sit-Stand-Sit, Assist Device rolling walker  -LS rolling walker  -LS     Transfer, Impairments strength decreased  -LS impaired balance;strength decreased  -LS     Transfer, Comment VC's for hand placement and trunk extension to achieve upright posture.   -LS VC's for hand placement and upright posture.   -LS     Recorded by [LS] Buffy Xiao, PT [LS] Buffy Xiao, PT     Gait Assessment/Treatment    Gait, [place] Level minimum assist (75% patient effort);1 person + 1 person to manage equipment;verbal cues required  -LS minimum assist (75% patient effort);1 person + 1 person to manage equipment;verbal cues required  -LS     Gait, Assistive Device rolling walker  -LS rolling walker  -LS     Gait, Distance (Feet) 100  -LS 70  -LS     Gait, Gait Deviations [place] decreased;decreased heel strike;forward flexed posture;step length decreased  -LS [place] decreased;decreased heel strike;step length decreased  -LS     Gait, Comment Constant vc's for increasing step length; required significant encouragement to progress at 2 brief standing rest breaks. Followed with recliner.   -LS VC's for upright posture, increasing step length and maintaining straight pathway in hallway.  -LS     Recorded by [LS] Buffy Xiao, PT [LS] Buffy Xiao, PT     Motor Skills/Interventions    Additional Documentation Balance Skills Training (Group)  -LS Balance Skills Training (Group)  -LS     Recorded by [LS] Buffy Xiao, PT [LS] Buffy Xiao, PT     Balance Skills Training    Sitting-Level of Assistance Contact guard  -LS Contact guard  -LS     Sitting-Balance Support Feet supported  -LS Feet supported  -LS     Standing-Level of Assistance Contact guard  -LS Contact guard  -LS     Static Standing Balance Support assistive device  -LS assistive device  -LS     Gait Balance-Level of Assistance Minimum assistance  -LS Minimum assistance  -LS     Gait Balance Support assistive device   -LS assistive device  -LS     Recorded by [LS] Buffy Xiao, PT [LS] Buffy Xiao, PT     Therapy Exercises    Bilateral Lower Extremities AROM:;10 reps;sitting;ankle pumps/circles;hip flexion;LAQ  -LS AROM:;10 reps;sitting;ankle pumps/circles;quad sets;LAQ  -LS     Bilateral Upper Extremity AAROM:;10 reps;sitting;elbow flexion/extension;shoulder extension/flexion;hand pumps  -LS      Recorded by [LS] Buffy Xiao, PT [LS] Buffy Xiao, PT     Positioning and Restraints    Pre-Treatment Position sitting in chair/recliner  -LS sitting in chair/recliner  -LS     Post Treatment Position chair  -LS chair  -LS     In Chair notified nsg;reclined;call light within reach;encouraged to call for assist;exit alarm on;RUE elevated;LUE elevated;legs elevated;heels elevated  -LS notified nsg;reclined;call light within reach;encouraged to call for assist;with family/caregiver;RUE elevated;LUE elevated;legs elevated;heels elevated  -LS     Recorded by [LS] Buffy Xiao, PT [LS] Buffy Xiao, PT       User Key  (r) = Recorded By, (t) = Taken By, (c) = Cosigned By    Initials Name Effective Dates    WILMER Xiao PT 06/19/15 -                 IP PT Goals       11/15/17 0923 11/14/17 1213 11/13/17 1006    Bed Mobility PT LTG    Bed Mobility PT LTG, Date Established   11/13/17  -LS    Bed Mobility PT LTG, Time to Achieve   2 wks  -LS    Bed Mobility PT LTG, Activity Type   supine to sit/sit to supine  -LS    Bed Mobility PT LTG, [place] Level   supervision required  -LS    Bed Mobility PT LTG, Outcome goal ongoing  -LS goal ongoing  -LS     Transfer Training PT LTG    Transfer Training PT LTG, Date Established   11/13/17  -LS    Transfer Training PT LTG, Time to Achieve   2 wks  -LS    Transfer Training PT LTG, Activity Type   sit to stand/stand to sit  -LS    Transfer Training PT LTG, [place] Level   supervision required  -LS    Transfer Training PT LTG, Assist Device   walker, rolling  -LS    Transfer Training PT  LTG, Outcome goal ongoing  -LS goal ongoing  -LS     Gait Training PT LTG    Gait Training Goal PT LTG, Date Established   11/13/17  -LS    Gait Training Goal PT LTG, Time to Achieve   2 wks  -LS    Gait Training Goal PT LTG, [place] Level   supervision required  -LS    Gait Training Goal PT LTG, Assist Device   walker, rolling  -LS    Gait Training Goal PT LTG, Distance to Achieve   200  -LS    Gait Training Goal PT LTG, Outcome goal ongoing  -LS goal ongoing  -LS     Stair Training PT LTG    Stair Training Goal PT LTG, Date Established   11/13/17  -LS    Stair Training Goal PT LTG, Time to Achieve   2 wks  -LS    Stair Training Goal PT LTG, Number of Steps   2  -LS    Stair Training Goal PT LTG, [place] Level   contact guard assist  -LS    Stair Training Goal PT LTG, Outcome goal ongoing  -LS goal ongoing  -LS       User Key  (r) = Recorded By, (t) = Taken By, (c) = Cosigned By    Initials Name Provider Type    LS Buffy Xiao, PT Physical Therapist          Physical Therapy Education     Title: PT OT SLP Therapies (Active)     Topic: Physical Therapy (Active)     Point: Mobility training (Active)    Learning Progress Summary    Learner Readiness Method Response Comment Documented by Status   Patient Acceptance E,D NR Discussed at length benefit of progressing mobility to promote PLOF. Needs encouragement.  11/15/17 0922 Active    Acceptance E VU,NR   11/14/17 2348 Done    Acceptance E,D NR   11/14/17 1213 Active    Acceptance E VU,NR   11/14/17 0414 Done    Acceptance E,D NR   11/13/17 1005 Active   Family Acceptance E VU,NR   11/14/17 2348 Done    Acceptance E VU,NR   11/14/17 0414 Done    Acceptance E,D NR   11/13/17 1005 Active   Significant Other Acceptance E,D NR   11/14/17 1213 Active               Point: Home exercise program (Active)    Learning Progress Summary    Learner Readiness Method Response Comment Documented by Status   Patient Acceptance E,D NR Discussed at length  benefit of progressing mobility to promote PLOF. Needs encouragement.  11/15/17 0922 Active    Acceptance E VU,NR  AC 11/14/17 2348 Done    Acceptance E,D NR   11/14/17 1213 Active    Acceptance E VU,NR  AC 11/14/17 0414 Done   Family Acceptance E VU,NR  AC 11/14/17 2348 Done    Acceptance E VU,NR  AC 11/14/17 0414 Done   Significant Other Acceptance E,D NR   11/14/17 1213 Active               Point: Body mechanics (Active)    Learning Progress Summary    Learner Readiness Method Response Comment Documented by Status   Patient Acceptance E,D NR Discussed at length benefit of progressing mobility to promote PLOF. Needs encouragement.  11/15/17 0922 Active    Acceptance E VU,NR   11/14/17 2348 Done    Acceptance E,D NR   11/14/17 1213 Active    Acceptance E VU,NR   11/14/17 0414 Done    Acceptance E,D NR   11/13/17 1005 Active   Family Acceptance E VU,NR   11/14/17 2348 Done    Acceptance E VU,NR   11/14/17 0414 Done    Acceptance E,D NR   11/13/17 1005 Active   Significant Other Acceptance E,D NR   11/14/17 1213 Active               Point: Precautions (Active)    Learning Progress Summary    Learner Readiness Method Response Comment Documented by Status   Patient Acceptance E,D NR Discussed at length benefit of progressing mobility to promote PLOF. Needs encouragement.  11/15/17 0922 Active    Acceptance E VU,NR  AC 11/14/17 2348 Done    Acceptance E,D NR   11/14/17 1213 Active    Acceptance E VU,NR   11/14/17 0414 Done    Acceptance E,D NR   11/13/17 1005 Active   Family Acceptance E VU,NR   11/14/17 2348 Done    Acceptance E VU,NR   11/14/17 0414 Done    Acceptance E,D NR   11/13/17 1005 Active   Significant Other Acceptance E,D NR   11/14/17 1213 Active                      User Key     Initials Effective Dates Name Provider Type Discipline     06/19/15 -  Buffy Xiao, PT Physical Therapist PT     07/03/17 -  Claudia Dietrich, RN Registered Nurse Nurse                     PT Recommendation and Plan  Anticipated Discharge Disposition: skilled nursing facility  PT Frequency: daily  Plan of Care Review  Plan Of Care Reviewed With: patient  Progress: progress toward functional goals is gradual  Outcome Summary/Follow up Plan: Pt demonstrated increased indep with STS compared to last PT session; progressed forward ambulation distance to 100 total ft with use of RWx. Pt with down disposition today, requiring significant encouragement re: benefit of progressing mobility. [facility] pt re: HEP for seated ther ex.           Outcome Measures       11/15/17 0815 11/14/17 1145 11/13/17 0836    How much help from another person do you currently need...    Turning from your back to your side while in flat bed without using bedrails? 3  -LS 3  -LS 3  -LS    Moving from lying on back to sitting on the side of a flat bed without bedrails? 2  -LS 2  -LS 2  -LS    Moving to and from a bed to a chair (including a wheelchair)? 3  -LS 3  -LS 3  -LS    Standing up from a chair using your arms (e.g., wheelchair, bedside chair)? 3  -LS 2  -LS 3  -LS    Climbing 3-5 steps with a railing? 1  -LS 1  -LS 2  -LS    To walk in hospital room? 3  -LS 3  -LS 3  -LS    AM-PAC 6 Clicks Score 15  -LS 14  -LS 16  -LS    Functional Assessment    Outcome Measure Options AM-PAC 6 Clicks Basic Mobility (PT)  -LS AM-PAC 6 Clicks Basic Mobility (PT)  -LS AM-PAC 6 Clicks Basic Mobility (PT)  -LS      User Key  (r) = Recorded By, (t) = Taken By, (c) = Cosigned By    Initials Name Provider Type    WILMER Xiao PT Physical Therapist           Time Calculation:         PT Charges       11/15/17 0928          Time Calculation    Start Time 0815  -LS      PT Received On 11/15/17  -      PT Goal Re-Cert Due Date 11/23/17  -      Time Calculation- PT    Total Timed Code Minutes- PT 25 minute(s)  -        User Key  (r) = Recorded By, (t) = Taken By, (c) = Cosigned By    Initials Name Provider Type    WILMER Xiao PT  Physical Therapist          Therapy Charges for Today     Code Description Service Date Service Provider Modifiers Qty    00053189325 HC GAIT TRAINING EA 15 MIN 11/14/2017 Buffy Xiao, PT GP 1    66481555797 HC PT THER PROC EA 15 MIN 11/14/2017 Buffy Xiao, PT GP 1    65108017998 HC PT THER SUPP EA 15 MIN 11/14/2017 Buffy Xiao, PT GP 2    36400132417 HC GAIT TRAINING EA 15 MIN 11/15/2017 Buffy Xiao, PT GP 1    30826755356 HC PT THER PROC EA 15 MIN 11/15/2017 Buffy Xiao, PT GP 1    55335269488 HC PT THER SUPP EA 15 MIN 11/15/2017 Buffy Xiao, PT GP 2          PT G-Codes  Outcome Measure Options: AM-PAC 6 Clicks Basic Mobility (PT)    Buffy Xiao, PT  11/15/2017

## 2017-11-15 NOTE — PROGRESS NOTES
"Critical Care Note     LOS: 7 days   Patient Care Team:  No Known Provider as PCP - General    Chief Complaint/Reason for visit:  Sepsis, renal failure      Subjective     83-year-old woman, nonsmoker with hypertension, dyslipidemia, GERD hospitalized November 6 with watery diarrhea for hypotension. She had gram-negative miles bacteremia with Escherichia coli in her urine. She was transferred to the ICU on BiPAP with volume overload and hypotension on a dopamine drip. Deep line was placed and she was changed to norepinephrine. Oral midodrine was initiated and pressors weaned. Nephrology evaluated for her acute renal insufficiency. She became volume overloaded, tachypneic requiring BiPAP and Bumex. Symptoms improved though she still has cough and shortness of air.  Interval History:   Ambulated with physical therapy, and a walker. He did a lot of encouragement. Appetite remains extremely poor. Minutes that she feels shaky    Review of Systems:    All systems were reviewed and negative except as noted in subjective.    Medical history, surgical history, social history, family history reviewed    Objective     Intake/Output:    Intake/Output Summary (Last 24 hours) at 11/15/17 5768  Last data filed at 11/15/17 1437   Gross per 24 hour   Intake           402.93 ml   Output             2200 ml   Net         -1797.07 ml       Nutrition:  Diet Regular    Infusions:           Telemetry:  Sinus Rhythm: normal sinus rhythm          Vital Signs  Blood pressure 165/91, pulse 102, temperature 98.2 °F (36.8 °C), temperature source Tympanic, resp. rate 20, height 63\" (160 cm), weight 166 lb 0.1 oz (75.3 kg), SpO2 100 %.    Physical Exam:  General Appearance:  Elderly white woman in no respiratory distress    Head:  Normocephalic, atraumatic    Eyes:          Pupils equal reactive to light, no jaundice, conjunctiva pink    Ears:     Throat: Oral mucosa moist    Neck: left IJ line. Trachea midline    Back:   Thoracic spine kyphosis  "   Lungs:   Breath sounds are bilateral,Decreased at the bases, expiratory wheezing improved     Heart:  Regular, S1, S2 auscultated,    Abdomen:   Bowel sounds present, nondistended, soft    Rectal:   Deferred   Extremities:  edema Present, improved    Pulses: Pedal pulses present    Skin: Warm and dry    Lymph nodes:    Neurologic: Alert, oriented, cooperative, handgrip symmetric       Results Review:     I reviewed the patient's new clinical results.     Results from last 7 days  Lab Units 11/15/17  0415 11/14/17  1912 11/14/17  0427 11/13/17  0732  11/09/17  0402   SODIUM mmol/L 143  --  140 143  < > 136   POTASSIUM mmol/L 3.5 3.5 2.9* 3.5  < > 3.5   CHLORIDE mmol/L 108  --  106 108  < > 102   CO2 mmol/L 24.0  --  25.0 22.0  < > 22.0   BUN mg/dL 49*  --  62* 64*  < > 70*   CREATININE mg/dL 3.10*  --  3.80* 4.40*  < > 4.60*   CALCIUM mg/dL 8.7  --  8.9 8.9  < > 6.7*   BILIRUBIN mg/dL  --   --   --   --   --  0.4   ALK PHOS U/L  --   --   --   --   --  67   ALT (SGPT) U/L  --   --   --   --   --  8   AST (SGOT) U/L  --   --   --   --   --  17   GLUCOSE mg/dL 106*  --  128* 101*  < > 112*   < > = values in this interval not displayed.    Results from last 7 days  Lab Units 11/15/17  0415 11/13/17  0732 11/12/17  0438   WBC 10*3/mm3 10.90* 10.65 10.61   HEMOGLOBIN g/dL 9.0* 8.6* 8.2*   HEMATOCRIT % 27.6* 26.7* 24.8*   PLATELETS 10*3/mm3 217 178 170   MONOCYTES % % 3.0  --   --        Results from last 7 days  Lab Units 11/12/17  1347   PH, ARTERIAL pH units 7.331*   PO2 ART mm Hg 77.3*   PCO2, ARTERIAL mm Hg 39.3   HCO3 ART mmol/L 20.8     Lab Results   Component Value Date    BLOODCX No growth at 5 days 11/08/2017     Lab Results   Component Value Date    URINECX 10,000-20,000 CFU/mL Escherichia coli (A) 11/08/2017   Escherichia coli is pansensitive  Stool for C. difficile toxin negative  Blood cx from [place] are negative    I reviewed the patient's new imaging including images and reports.          FINDINGS:  Portable chest reveals low lung volumes. Small bilateral  pleural effusions with ill-defined opacification lung bases. The heart  is borderline large. Deep line catheter unchanged on the left.          IMPRESSION:  Stable chest as above.      D:  11/13/2017    Interpretation Summary      · Left atrial cavity size is borderline dilated.  · Mild to moderate tricuspid valve regurgitation is present.  · Calculated right ventricular systolic pressure from tricuspid regurgitation is 40 mmHg.  · Mild aortic valve regurgitation is present.  · Mild mitral valve regurgitation is present.  · Left ventricular systolic function is normal. Estimated EF = 65%.  · There is no evidence of pericardial effusion.  · Mild pulmonary hypertension is present.  · The aortic valve exhibits sclerosis.  · Normal right ventricular cavity size, wall thickness, systolic function and septal motion noted.  · Left ventricular diastolic function is normal.           All medications reviewed.     ceftriaxone 1 g Intravenous Q24H   famotidine 20 mg Oral Daily   ferric gluconate (FERRLECIT) IVPB 125 mg Intravenous Daily   heparin (porcine) 5,000 Units Subcutaneous Q8H   ipratropium-albuterol 3 mL Nebulization Q4H - RT         Assessment/Plan     Principal Problem:    Sepsis, probable source is urine  Active Problems:    UTI (urinary tract infection)    Acute renal failure (ARF)    Gastroenteritis    83-year-old woman who presented with sepsis, Escherichia coli urinary tract infection, diarrhea. Diarrhea resolved. Appetite is poor.  She was hypotensive and in acute renal failure. Serum creatinine finally starting to decline, now 3.1, peaked at 4.7.  She developed volume overload, responding nicely to Bumex. Edema is significantly improved but respiratory exam has persistent wheezing.  She has normal LVEF. She remains afebrile on Rocephin for the Escherichia coli in her urine. She may also have an additional pneumonia; Although particularly her left base  is improving post diuresis.    PLAN:    Incentive spirometry  Stop midodrine   Rocephin  Replace potassium  Bumex per nephrology  Ativan PRN anxiety  Ambulate daily with physical therapy  [place] for another 1-2 days then removed    VTE Prophylaxis: subcutaneous heparin    Stress Ulcer Prophylaxis: none    Angeles Mcgill MD  11/15/17  5:48 PM      Time: 30min  I personally provided care to this critically ill patient as documented above.  Critical care time does not include time spent on separately billed procedures.  Non of my critical care time was concurrent with other critical care providers.

## 2017-11-15 NOTE — PROGRESS NOTES
Continued Stay Note  [facility]     Patient Name: Jaclyn Garcia  MRN: [id number]  Today's Date: 11/15/2017    Admit Date: 11/8/2017          Discharge Plan       11/15/17 0911    Case Management/Social Work Plan    Plan [facility]    Patient/Family In Agreement With Plan yes    Additional Comments Goal is to transfer to [facility] when medically stable.                Discharge Codes     None        Expected Discharge Date and Time     Expected Discharge Date Expected Discharge Time    Nov 17, 2017             Aung Watson RN

## 2017-11-15 NOTE — PLAN OF CARE
Problem: Patient Care Overview (Adult)  Goal: Plan of Care Review  Outcome: Ongoing (interventions implemented as appropriate)    11/15/17 0923   Coping/Psychosocial Response Interventions   Plan Of Care Reviewed With patient   Patient Care Overview   Progress progress toward functional goals is gradual   Outcome Evaluation   Outcome Summary/Follow up Plan Pt demonstrated increased indep with STS compared to last PT session; progressed forward ambulation distance to 100 total ft with use of RWx. Pt with down disposition today, requiring significant encouragement re: benefit of progressing mobility. Edu pt re: HEP for seated ther ex.          Problem: Inpatient Physical Therapy  Goal: Bed Mobility Goal LTG- PT  Outcome: Ongoing (interventions implemented as appropriate)    11/13/17 1006 11/15/17 0923   Bed Mobility PT LTG   Bed Mobility PT LTG, Date Established 11/13/17 --    Bed Mobility PT LTG, Time to Achieve 2 wks --    Bed Mobility PT LTG, Activity Type supine to sit/sit to supine --    Bed Mobility PT LTG, [place] Level supervision required --    Bed Mobility PT LTG, Outcome --  goal ongoing       Goal: Transfer Training Goal 1 LTG- PT  Outcome: Ongoing (interventions implemented as appropriate)    11/13/17 1006 11/15/17 0923   Transfer Training PT LTG   Transfer Training PT LTG, Date Established 11/13/17 --    Transfer Training PT LTG, Time to Achieve 2 wks --    Transfer Training PT LTG, Activity Type sit to stand/stand to sit --    Transfer Training PT LTG, [place] Level supervision required --    Transfer Training PT LTG, Assist Device walker, rolling --    Transfer Training PT LTG, Outcome --  goal ongoing       Goal: Gait Training Goal LTG- PT  Outcome: Ongoing (interventions implemented as appropriate)    11/13/17 1006 11/15/17 0923   Gait Training PT LTG   Gait Training Goal PT LTG, Date Established 11/13/17 --    Gait Training Goal PT LTG, Time to Achieve 2 wks --    Gait Training Goal PT  LTG, [place] Level supervision required --    Gait Training Goal PT LTG, Assist Device walker, rolling --    Gait Training Goal PT LTG, Distance to Achieve 200 --    Gait Training Goal PT LTG, Outcome --  goal ongoing       Goal: Stair Training Goal LTG- PT  Outcome: Ongoing (interventions implemented as appropriate)    11/13/17 1006 11/15/17 0923   Stair Training PT LTG   Stair Training Goal PT LTG, Date Established 11/13/17 --    Stair Training Goal PT LTG, Time to Achieve 2 wks --    Stair Training Goal PT LTG, Number of Steps 2 --    Stair Training Goal PT LTG, [place] Level contact guard assist --    Stair Training Goal PT LTG, Outcome --  goal ongoing

## 2017-11-15 NOTE — PROGRESS NOTES
Multidisciplinary Rounds    Time: 20min  Patient Name: Jaclyn Garcia  Date of Encounter: 11/15/17 12:10 PM  MRN: [id number]  Admission date: 11/8/2017      Reason for visit: MDR.     Additional information obtained during MDR:  Pt not eating well, willing to drink supplements; nsg reports pt seems depressed.    Current diet: Diet Regular      Intervention:  Follow treatment plan  Care plan reviewed    Follow up:   RD to follow per protocol      Pau Bhatia RD  12:10 PM

## 2017-11-15 NOTE — PROGRESS NOTES
"   LOS: 7 days    Patient Care Team:  No Known Provider as PCP - General    Reason For Visit:  F/U ELLE.  Subjective           Review of Systems:    Pulm: SOME soa   CV:  No CP      Objective       bumetanide 1 mg Intravenous Once   ceftriaxone 1 g Intravenous Q24H   famotidine 20 mg Oral Daily   ferric gluconate (FERRLECIT) IVPB 125 mg Intravenous Daily   heparin (porcine) 5,000 Units Subcutaneous Q8H   ipratropium-albuterol      ipratropium-albuterol 3 mL Nebulization Q4H - RT   potassium chloride 20 mEq Oral Once            Vital Signs:  Blood pressure 142/81, pulse 96, temperature 98.6 °F (37 °C), resp. rate 20, height 63\" (160 cm), weight 166 lb 0.1 oz (75.3 kg), SpO2 97 %.    Flowsheet Rows         First Filed Value    Admission Height  63\" (160 cm) Documented at 11/08/2017 2018    Admission Weight  166 lb 0.1 oz (75.3 kg) Documented at 11/08/2017 2018 11/14 0701 - 11/15 0700  In: 1260.9 [P.O.:840; I.V.:120.9]  Out: 3205 [Urine:3205]    Physical Exam:    General Appearance: NAD, alert and cooperative, Ox3  Eyes: PER, conjunctivae and sclerae normal, no icterus  Lungs: FEW CRACKLES  Heart/CV: regular rhythm & normal rate, no murmur, no gallop, no rub and TR-1+ edema  Abdomen: not distended, soft, non-tender, no masses,  bowel sounds present  Skin: No rash, Warm and dry    Radiology:            Labs:    Results from last 7 days  Lab Units 11/15/17  0415 11/13/17  0732 11/12/17  0438   WBC 10*3/mm3 10.90* 10.65 10.61   HEMOGLOBIN g/dL 9.0* 8.6* 8.2*   HEMATOCRIT % 27.6* 26.7* 24.8*   PLATELETS 10*3/mm3 217 178 170       Results from last 7 days  Lab Units 11/15/17  0415 11/14/17  1912 11/14/17  0427 11/13/17  0732 11/12/17  0438 11/11/17  0355  11/10/17  0431   SODIUM mmol/L 143  --  140 143 141 136  --  140   POTASSIUM mmol/L 3.5 3.5 2.9* 3.5 3.4* 3.9  < > 3.0*   CHLORIDE mmol/L 108  --  106 108 105 102  --  112*   CO2 mmol/L 24.0  --  25.0 22.0 23.0 21.0  --  22.0   BUN mg/dL 49*  --  62* 64* 69* 61*  " --  61*   CREATININE mg/dL 3.10*  --  3.80* 4.40* 4.40* 4.70*  --  3.80*   CALCIUM mg/dL 8.7  --  8.9 8.9 7.8* 7.7*  --  5.4*   PHOSPHORUS mg/dL 4.2  --  5.2* 6.0* 5.8* 6.2*  --  4.5   MAGNESIUM mg/dL 2.0  --   --   --  2.7 2.7  --  1.5   ALBUMIN g/dL 3.30  --  3.30 3.50 3.40 2.70*  --   --    < > = values in this interval not displayed.    Results from last 7 days  Lab Units 11/15/17  0415   GLUCOSE mg/dL 106*         Results from last 7 days  Lab Units 11/09/17  0402   ALK PHOS U/L 67   BILIRUBIN mg/dL 0.4   ALT (SGPT) U/L 8   AST (SGOT) U/L 17       Results from last 7 days  Lab Units 11/12/17  1347   PH, ARTERIAL pH units 7.331*   PO2 ART mm Hg 77.3*   PCO2, ARTERIAL mm Hg 39.3   HCO3 ART mmol/L 20.8         Results from last 7 days  Lab Units 11/08/17  2131   COLOR UA  Yellow   CLARITY UA  Cloudy*   PH, URINE  6.0   SPECIFIC GRAVITY, URINE  1.009   GLUCOSE UA  Negative   KETONES UA  Negative   BILIRUBIN UA  Negative   PROTEIN UA  30 mg/dL (1+)*   BLOOD UA  Large (3+)*   LEUKOCYTES UA  Moderate (2+)*   NITRITE UA  Negative       Estimated Creatinine Clearance: 13.4 mL/min (by C-G formula based on Cr of 3.1).      Assessment     Principal Problem:    Sepsis, probable source is urine  Active Problems:    Acute renal failure (ARF)    UTI (urinary tract infection)    Gastroenteritis            Impression: NONOLIGURIC ELLE WITH IMPROVING RENAL FXN. ANEMIA. EDEMA.            Recommendations: BUMEX 1 MG IV X 1.      Orlando Melvin MD  11/15/17  9:59 AM

## 2017-11-15 NOTE — PLAN OF CARE
Problem: Patient Care Overview (Adult)  Goal: Plan of Care Review  Outcome: Ongoing (interventions implemented as appropriate)    11/15/17 1705   Coping/Psychosocial Response Interventions   Plan Of Care Reviewed With patient   Patient Care Overview   Progress improving   Outcome Evaluation   Outcome Summary/Follow up Plan Pt transfered from ICU to floor today         Problem: Renal Failure/Kidney Injury, Acute (Adult)  Goal: Signs and Symptoms of Listed Potential Problems Will be Absent or Manageable (Renal Failure/Kidney Injury, Acute)  Outcome: Ongoing (interventions implemented as appropriate)    Problem: Sepsis (Adult)  Goal: Signs and Symptoms of Listed Potential Problems Will be Absent or Manageable (Sepsis)  Outcome: Ongoing (interventions implemented as appropriate)    Problem: Pressure Ulcer Risk (Tal Scale) (Adult,Obstetrics,Pediatric)  Goal: Identify Related Risk Factors and Signs and Symptoms  Outcome: Ongoing (interventions implemented as appropriate)    Problem: Fluid Volume Excess (Adult,Obstetrics,Pediatric)  Goal: Identify Related Risk Factors and Signs and Symptoms  Outcome: Ongoing (interventions implemented as appropriate)    Problem: Fall Risk (Adult)  Goal: Identify Related Risk Factors and Signs and Symptoms  Outcome: Ongoing (interventions implemented as appropriate)

## 2017-11-16 ENCOUNTER — APPOINTMENT (OUTPATIENT)
Dept: GENERAL RADIOLOGY | Facility: HOSPITAL | Age: 82
End: 2017-11-16

## 2017-11-16 LAB
ALBUMIN SERPL-MCNC: 3.2 G/DL (ref 3.2–4.8)
ANION GAP SERPL CALCULATED.3IONS-SCNC: 9 MMOL/L (ref 3–11)
BUN BLD-MCNC: 39 MG/DL (ref 9–23)
BUN/CREAT SERPL: 15 (ref 7–25)
CALCIUM SPEC-SCNC: 9 MG/DL (ref 8.7–10.4)
CHLORIDE SERPL-SCNC: 106 MMOL/L (ref 99–109)
CO2 SERPL-SCNC: 25 MMOL/L (ref 20–31)
CREAT BLD-MCNC: 2.6 MG/DL (ref 0.6–1.3)
GFR SERPL CREATININE-BSD FRML MDRD: 18 ML/MIN/1.73
GLUCOSE BLD-MCNC: 128 MG/DL (ref 70–100)
PHOSPHATE SERPL-MCNC: 3.9 MG/DL (ref 2.4–5.1)
POTASSIUM BLD-SCNC: 3.4 MMOL/L (ref 3.5–5.5)
SODIUM BLD-SCNC: 140 MMOL/L (ref 132–146)

## 2017-11-16 PROCEDURE — 80069 RENAL FUNCTION PANEL: CPT | Performed by: INTERNAL MEDICINE

## 2017-11-16 PROCEDURE — 97116 GAIT TRAINING THERAPY: CPT

## 2017-11-16 PROCEDURE — 94799 UNLISTED PULMONARY SVC/PX: CPT

## 2017-11-16 PROCEDURE — 25010000002 HEPARIN (PORCINE) PER 1000 UNITS: Performed by: INTERNAL MEDICINE

## 2017-11-16 PROCEDURE — 25010000002 CEFTRIAXONE PER 250 MG: Performed by: INTERNAL MEDICINE

## 2017-11-16 PROCEDURE — 99232 SBSQ HOSP IP/OBS MODERATE 35: CPT | Performed by: INTERNAL MEDICINE

## 2017-11-16 PROCEDURE — 94640 AIRWAY INHALATION TREATMENT: CPT

## 2017-11-16 PROCEDURE — 25010000002 NA FERRIC GLUC CPLX PER 12.5 MG: Performed by: INTERNAL MEDICINE

## 2017-11-16 PROCEDURE — 94760 N-INVAS EAR/PLS OXIMETRY 1: CPT

## 2017-11-16 PROCEDURE — 97110 THERAPEUTIC EXERCISES: CPT

## 2017-11-16 PROCEDURE — 74000 HC ABDOMEN KUB: CPT

## 2017-11-16 RX ORDER — POTASSIUM CHLORIDE 750 MG/1
40 CAPSULE, EXTENDED RELEASE ORAL ONCE
Status: COMPLETED | OUTPATIENT
Start: 2017-11-16 | End: 2017-11-16

## 2017-11-16 RX ORDER — BUMETANIDE 0.25 MG/ML
2 INJECTION INTRAMUSCULAR; INTRAVENOUS ONCE
Status: COMPLETED | OUTPATIENT
Start: 2017-11-16 | End: 2017-11-16

## 2017-11-16 RX ADMIN — CEFTRIAXONE SODIUM 1 G: 1 INJECTION, SOLUTION INTRAVENOUS at 15:03

## 2017-11-16 RX ADMIN — HEPARIN SODIUM 5000 UNITS: 5000 INJECTION, SOLUTION INTRAVENOUS; SUBCUTANEOUS at 15:02

## 2017-11-16 RX ADMIN — POTASSIUM CHLORIDE 40 MEQ: 750 CAPSULE, EXTENDED RELEASE ORAL at 15:02

## 2017-11-16 RX ADMIN — ALPRAZOLAM 0.25 MG: 0.25 TABLET ORAL at 20:28

## 2017-11-16 RX ADMIN — Medication 5 MG: at 20:28

## 2017-11-16 RX ADMIN — HEPARIN SODIUM 5000 UNITS: 5000 INJECTION, SOLUTION INTRAVENOUS; SUBCUTANEOUS at 06:03

## 2017-11-16 RX ADMIN — SODIUM CHLORIDE 125 MG: 9 INJECTION, SOLUTION INTRAVENOUS at 08:52

## 2017-11-16 RX ADMIN — IPRATROPIUM BROMIDE AND ALBUTEROL SULFATE 3 ML: .5; 3 SOLUTION RESPIRATORY (INHALATION) at 19:00

## 2017-11-16 RX ADMIN — IPRATROPIUM BROMIDE AND ALBUTEROL SULFATE 3 ML: .5; 3 SOLUTION RESPIRATORY (INHALATION) at 07:22

## 2017-11-16 RX ADMIN — POTASSIUM CHLORIDE 40 MEQ: 750 CAPSULE, EXTENDED RELEASE ORAL at 06:53

## 2017-11-16 RX ADMIN — FAMOTIDINE 20 MG: 20 TABLET, FILM COATED ORAL at 08:52

## 2017-11-16 RX ADMIN — ACETAMINOPHEN 650 MG: 325 TABLET ORAL at 06:03

## 2017-11-16 RX ADMIN — IPRATROPIUM BROMIDE AND ALBUTEROL SULFATE 3 ML: .5; 3 SOLUTION RESPIRATORY (INHALATION) at 16:57

## 2017-11-16 RX ADMIN — BUMETANIDE 2 MG: 0.25 INJECTION INTRAMUSCULAR; INTRAVENOUS at 15:00

## 2017-11-16 RX ADMIN — HEPARIN SODIUM 5000 UNITS: 5000 INJECTION, SOLUTION INTRAVENOUS; SUBCUTANEOUS at 20:28

## 2017-11-16 NOTE — PROGRESS NOTES
[facility] Medicine Services  PROGRESS NOTE    Patient Name: Jaclyn Garcia  : 1934  MRN: [id number]    Date of Admission: 2017  Length of Stay: 8  Primary Care Physician: No Known Provider    Subjective   Subjective     CC: abdominal pain and diarrhea    Subjective:  Resting in a chair in no acute distress and looks calm and comfortable.  Complains of lower quadrant pain and tenderness.  Also complains of mild shortness of breath.  No fever or chills.  No nausea, vomiting, diarrhea.  No headache or visual changes.  No difficulty swallowing.  No focal weakness or numbness.  No rashes or hives    Review of Systems      Otherwise ROS is negative except as mentioned above.    Objective   Objective     Vital Signs:   Temp:  [98.2 °F (36.8 °C)-98.6 °F (37 °C)] 98.2 °F (36.8 °C)  Heart Rate:  [] 103  Resp:  [18-20] 18  BP: (112-165)/(78-86) 113/80        Physical Exam:  Constitutional: No acute distress, awake, alert  Eyes: PERRLA, sclerae anicteric, no conjunctival injection  HENT: NCAT, mucous membranes moist  Neck: Supple, no thyromegaly, no lymphadenopathy, trachea midline  Respiratory: Clear to auscultation bilaterally, nonlabored respirations.  Patient is on 2 L of nasal cannula and saturating well   Cardiovascular: RRR, no murmurs, rubs, or gallops, palpable pedal pulses bilaterally  Gastrointestinal: Very obese, Positive bowel sounds, soft, mild lower quadrant tenderness, nondistended  Musculoskeletal: No bilateral ankle edema, no clubbing or cyanosis to extremities  Psychiatric: Appropriate affect, cooperative  Neurologic: Oriented x 3, strength symmetric in all extremities, Cranial Nerves grossly intact to confrontation, speech clear  Skin: No rashes  Results Reviewed:  I have personally reviewed current lab, radiology, and data and agree.      Results from last 7 days  Lab Units 11/15/17  0415 17  0732 17  0438   WBC 10*3/mm3 10.90* 10.65 10.61    HEMOGLOBIN g/dL 9.0* 8.6* 8.2*   HEMATOCRIT % 27.6* 26.7* 24.8*   PLATELETS 10*3/mm3 217 178 170   INR   --   --  1.32       Results from last 7 days  Lab Units 11/16/17  0427 11/15/17  0415 11/14/17  1912 11/14/17  0427  11/12/17  1326   SODIUM mmol/L 140 143  --  140  < >  --    POTASSIUM mmol/L 3.4* 3.5 3.5 2.9*  < >  --    CHLORIDE mmol/L 106 108  --  106  < >  --    CO2 mmol/L 25.0 24.0  --  25.0  < >  --    BUN mg/dL 39* 49*  --  62*  < >  --    CREATININE mg/dL 2.60* 3.10*  --  3.80*  < >  --    GLUCOSE mg/dL 128* 106*  --  128*  < >  --    CALCIUM mg/dL 9.0 8.7  --  8.9  < >  --    TROPONIN I ng/mL  --   --   --   --   --  0.055*   < > = values in this interval not displayed.  No results found for: BNP  No results found for: PHART    Microbiology Results Abnormal     Procedure Component Value - Date/Time    Blood Culture - Blood, [[id number]]  (Normal) Collected:  11/08/17 2049    Lab Status:  Final result Specimen:  Blood from Hand, Left Updated:  11/13/17 2216     Blood Culture No growth at 5 days    Blood Culture - Blood, [[id number]]  (Normal) Collected:  11/08/17 2055    Lab Status:  Final result Specimen:  Blood from Hand, Right Updated:  11/13/17 2216     Blood Culture No growth at 5 days    Eosinophil Smear - Urine, Urine, Clean Catch [[id number]]  (Normal) Collected:  11/11/17 1030    Lab Status:  Final result Specimen:  Urine from Urine, Clean Catch Updated:  11/11/17 1547     Eosinophil Smear 0 % EOS/100 Cells     Narrative:       No eosinophil seen    Urine Culture - Urine, Urine, Catheter [[id number]]  (Abnormal)  (Susceptibility) Collected:  11/08/17 2131    Lab Status:  Final result Specimen:  Urine from Urine, Catheter Updated:  11/11/17 1157     Urine Culture --      10,000-20,000 CFU/mL Escherichia coli (A)    Susceptibility      Escherichia coli     CHERYL     Ampicillin <=8 ug/ml Susceptible     Ampicillin + Sulbactam <=8/4 ug/ml Susceptible     Aztreonam <=8 ug/ml Susceptible     Cefepime <=8  ug/ml Susceptible     Cefotaxime <=2 ug/ml Susceptible     Ceftriaxone <=8 ug/ml Susceptible     Cefuroxime sodium <=4 ug/ml Susceptible     Cephalothin <=8 ug/ml Susceptible     Ertapenem <=1 ug/ml Susceptible     Gentamicin <=4 ug/ml Susceptible     Levofloxacin <=2 ug/ml Susceptible     Meropenem <=1 ug/ml Susceptible     Nitrofurantoin <=32 ug/ml Susceptible     Piperacillin + Tazobactam <=16 ug/ml Susceptible     Tetracycline <=4 ug/ml Susceptible     Tobramycin <=4 ug/ml Susceptible     Trimethoprim + Sulfamethoxazole <=2/38 ug/ml Susceptible                    Clostridium Difficile Toxin - Stool, Per Rectum [[id number]] Collected:  11/09/17 0701    Lab Status:  Final result Specimen:  Stool from Per Rectum Updated:  11/09/17 0922    Narrative:       The following orders were created for panel order Clostridium Difficile Toxin - Stool, Per Rectum.  Procedure                               Abnormality         Status                     ---------                               -----------         ------                     Clostridium Difficile To...[[id number]]  Normal              Final result                 Please view results for these tests on the individual orders.    Clostridium Difficile Toxin, PCR - Stool, Per Rectum [[id number]]  (Normal) Collected:  11/09/17 0701    Lab Status:  Final result Specimen:  Stool from Per Rectum Updated:  11/09/17 0922     C. Difficile Toxins by PCR Not Detected    Narrative:         Performance characteristics of test not established for patients <2 years of age.  Negative for Toxigenic C. Difficile          Imaging Results (last 24 hours)     ** No results found for the last 24 hours. **        Results for orders placed during the hospital encounter of 11/08/17   Adult Transthoracic Echo Complete W/ Cont if Necessary Per Protocol    Narrative · Left atrial cavity size is borderline dilated.  · Mild to moderate tricuspid valve regurgitation is present.  · Calculated right  ventricular systolic pressure from tricuspid   regurgitation is 40 mmHg.  · Mild aortic valve regurgitation is present.  · Mild mitral valve regurgitation is present.  · Left ventricular systolic function is normal. Estimated EF = 65%.  · There is no evidence of pericardial effusion.  · Mild pulmonary hypertension is present.  · The aortic valve exhibits sclerosis.  · Normal right ventricular cavity size, wall thickness, systolic function   and septal motion noted.  · Left ventricular diastolic function is normal.          I have reviewed the medications.    Assessment/Plan   Assessment / Plan     Hospital Problem List     * (Principal)Sepsis, probable source is urine    Acute renal failure (ARF)    UTI (urinary tract infection)    Gastroenteritis             Brief Hospital Course to date:  Jaclyn Garcia is a 83 y.o. female with past medical history significant for hypertension, hyperlipidemia, GERD.  Patient was brought to the emergency room after few days of diarrhea.  Patient was initially admitted to ICU for sepsis and UTI and also for acute kidney injury.  On admission creatinine was above 4 but it has come down since then.  Patient was transferred from ICU to the floor on 11/15/2017.      Assessment & Plan:  * Urinary tract infection.    * Sepsis secondary to above improved    * Acute kidney injury with creatinine above 4 improving.    *Recent history of diarrhea and abdominal pain.  Abdominal pain has not completely resolved.    * Severe generalized weakness    DVT Prophylaxis:      CODE STATUS: Conditional Code    Disposition: TBD.    Dangelo Lopez MD  11/16/17  3:56 PM

## 2017-11-16 NOTE — PLAN OF CARE
Problem: Patient Care Overview (Adult)  Goal: Plan of Care Review  Outcome: Ongoing (interventions implemented as appropriate)    11/16/17 1407   Coping/Psychosocial Response Interventions   Plan Of Care Reviewed With patient;family   Patient Care Overview   Progress progress toward functional goals is gradual   Outcome Evaluation   Outcome Summary/Follow up Plan pt up in chair,fatigue.ambulat 60 ft only,did exercises sitting in chair         Problem: Inpatient Physical Therapy  Goal: Bed Mobility Goal LTG- PT  Outcome: Ongoing (interventions implemented as appropriate)    11/13/17 1006 11/16/17 1407   Bed Mobility PT LTG   Bed Mobility PT LTG, Date Established 11/13/17 --    Bed Mobility PT LTG, Time to Achieve 2 wks --    Bed Mobility PT LTG, Activity Type supine to sit/sit to supine --    Bed Mobility PT LTG, [place] Level supervision required --    Bed Mobility PT LTG, Outcome --  goal ongoing       Goal: Transfer Training Goal 1 LTG- PT  Outcome: Ongoing (interventions implemented as appropriate)    11/13/17 1006 11/16/17 1407   Transfer Training PT LTG   Transfer Training PT LTG, Date Established 11/13/17 --    Transfer Training PT LTG, Time to Achieve 2 wks --    Transfer Training PT LTG, Activity Type sit to stand/stand to sit --    Transfer Training PT LTG, [place] Level supervision required --    Transfer Training PT LTG, Assist Device walker, rolling --    Transfer Training PT LTG, Outcome --  goal ongoing       Goal: Gait Training Goal LTG- PT  Outcome: Ongoing (interventions implemented as appropriate)    11/13/17 1006 11/16/17 1407   Gait Training PT LTG   Gait Training Goal PT LTG, Date Established 11/13/17 --    Gait Training Goal PT LTG, Time to Achieve 2 wks --    Gait Training Goal PT LTG, [place] Level supervision required --    Gait Training Goal PT LTG, Assist Device walker, rolling --    Gait Training Goal PT LTG, Distance to Achieve 200 --    Gait Training Goal PT LTG, Outcome  --  goal ongoing       Goal: Stair Training Goal LTG- PT  Outcome: Ongoing (interventions implemented as appropriate)    11/13/17 1006 11/16/17 1407   Stair Training PT LTG   Stair Training Goal PT LTG, Date Established 11/13/17 --    Stair Training Goal PT LTG, Time to Achieve 2 wks --    Stair Training Goal PT LTG, Number of Steps 2 --    Stair Training Goal PT LTG, Door Level contact guard assist --    Stair Training Goal PT LTG, Outcome --  goal ongoing

## 2017-11-16 NOTE — PROGRESS NOTES
"Adult Nutrition  Assessment/PES    Patient Name:  Jaclyn Gacria  YOB: 1934  MRN: [id number]  Admit Date:  11/8/2017    Assessment Date:  11/16/2017 11/16/17 1424    Reason for Assessment    Reason For Assessment/Visit follow up protocol    Time Spent (min) 20    Diagnosis Diagnosis   per notes this admission    Gastrointestinal Gastroenteritis              Nutrition/Diet History       11/16/17 1451    Nutrition/Diet History    Reported/Observed By Patient;Family    Appetite Poor    Food Habit/Preferences Uses Supplements    Other Patient asleep at time of visit. Family in room reports patients appetite has been poor. Has mostly just been picking at meals since transfer to floor. Observed patient did not eat much of lunch tray. Family reports patient has been drinking supplements            Anthropometrics       11/16/17 1425    Anthropometrics (Special Considerations)    Height Used for Calculations 1.6 m (5' 3\")    Weight Used for Calculations 69.6 kg (153 lb 7 oz)   standing scale weight per charting 11/16            Labs/Tests/Procedures/Meds       11/16/17 1425    Labs/Tests/Procedures/Meds    Labs/Tests Review Reviewed                Nutrition Prescription Ordered       11/16/17 1426    Nutrition Prescription PO    Current PO Diet Regular    Supplement Mighty Shake    Supplement Frequency 2 times a day            Evaluation of Received Nutrient/Fluid Intake       11/16/17 1453    PO Evaluation    Number of Days PO Intake Evaluated --   PO intake recorded from (11/14 - 11/15)    Number of Meals 4    % PO Intake 38%   per RN charting, patient has had 100%/1 nutrition supplement          Problem/Interventions:        Problem 1       11/16/17 1454    Nutrition Diagnoses Problem 1    Problem 1 Inadequate Intake/Infusion    Inadequate Intake Type Oral    Etiology (related to) --   clinical condition/poor appetite    Signs/Symptoms (evidenced by) PO Intake    Percent (%) intake recorded 38 %    " Over number of meals 4   patient also drinking nutrition supplements                  Intervention Goal       11/16/17 1454    Intervention Goal    General Nutrition support treatment    PO Increase intake            Nutrition Intervention       11/16/17 1454    Nutrition Intervention    RD/Tech Action Advise alternate selection;Encourage intake;Follow Tx progress;Care plan reviewd   encouraged full use of nutrition supplements              Education/Evaluation       11/16/17 1453    Monitor/Evaluation    Monitor Per protocol;PO intake;Supplement intake        Electronically signed by:  Jennie Mccarty  11/16/17 2:55 PM

## 2017-11-16 NOTE — PROGRESS NOTES
Continued Stay Note  [facility]     Patient Name: Jaclyn Garcia  MRN: [id number]  Today's Date: 11/16/2017    Admit Date: 11/8/2017          Discharge Plan     Consent obtained for the participation in the [facility] Transitions Program. Jamilah Keita RN                Discharge Codes     None        Expected Discharge Date and Time     Expected Discharge Date Expected Discharge Time    Nov 17, 2017             Jamilah Keita RN

## 2017-11-16 NOTE — PROGRESS NOTES
"   LOS: 8 days    Patient Care Team:  No Known Provider as PCP - General    Reason For Visit:  F/U ELLE  Subjective           Review of Systems:    Pulm: SOME soa   CV:  No CP      Objective       bumetanide 2 mg Intravenous Once   ceftriaxone 1 g Intravenous Q24H   famotidine 20 mg Oral Daily   ferric gluconate (FERRLECIT) IVPB 125 mg Intravenous Daily   heparin (porcine) 5,000 Units Subcutaneous Q8H   ipratropium-albuterol 3 mL Nebulization 4x Daily - RT   potassium chloride 40 mEq Oral Once            Vital Signs:  Blood pressure 112/78, pulse 103, temperature 98.2 °F (36.8 °C), temperature source Oral, resp. rate 18, height 63\" (160 cm), weight 153 lb 6.4 oz (69.6 kg), SpO2 98 %.    Flowsheet Rows         First Filed Value    Admission Height  63\" (160 cm) Documented at 11/08/2017 2018    Admission Weight  166 lb 0.1 oz (75.3 kg) Documented at 11/08/2017 2018          11/15 0701 - 11/16 0700  In: 610 [P.O.:610]  Out: 2600 [Urine:2600]    Physical Exam:    General Appearance: NAD, alert and cooperative, Ox3  Eyes: PER, conjunctivae and sclerae normal, no icterus  Lungs: FEW CRACKLES.  Heart/CV: regular rhythm & normal rate, no murmur, no gallop, no rub and 1+ edema  Abdomen: not distended, soft, non-tender, no masses,  bowel sounds present  Skin: No rash, Warm and dry    Radiology:            Labs:    Results from last 7 days  Lab Units 11/15/17  0415 11/13/17  0732 11/12/17  0438   WBC 10*3/mm3 10.90* 10.65 10.61   HEMOGLOBIN g/dL 9.0* 8.6* 8.2*   HEMATOCRIT % 27.6* 26.7* 24.8*   PLATELETS 10*3/mm3 217 178 170       Results from last 7 days  Lab Units 11/16/17  0427 11/15/17  0415 11/14/17  1912 11/14/17 0427 11/13/17  0732 11/12/17  0438 11/11/17  0355  11/10/17  0431   SODIUM mmol/L 140 143  --  140 143 141 136  --  140   POTASSIUM mmol/L 3.4* 3.5 3.5 2.9* 3.5 3.4* 3.9  < > 3.0*   CHLORIDE mmol/L 106 108  --  106 108 105 102  --  112*   CO2 mmol/L 25.0 24.0  --  25.0 22.0 23.0 21.0  --  22.0   BUN mg/dL 39* " 49*  --  62* 64* 69* 61*  --  61*   CREATININE mg/dL 2.60* 3.10*  --  3.80* 4.40* 4.40* 4.70*  --  3.80*   CALCIUM mg/dL 9.0 8.7  --  8.9 8.9 7.8* 7.7*  --  5.4*   PHOSPHORUS mg/dL 3.9 4.2  --  5.2* 6.0* 5.8* 6.2*  --  4.5   MAGNESIUM mg/dL  --  2.0  --   --   --  2.7 2.7  --  1.5   ALBUMIN g/dL 3.20 3.30  --  3.30 3.50 3.40 2.70*  < >  --    < > = values in this interval not displayed.    Results from last 7 days  Lab Units 11/16/17  0427   GLUCOSE mg/dL 128*             Results from last 7 days  Lab Units 11/12/17  1347   PH, ARTERIAL pH units 7.331*   PO2 ART mm Hg 77.3*   PCO2, ARTERIAL mm Hg 39.3   HCO3 ART mmol/L 20.8             Estimated Creatinine Clearance: 15.3 mL/min (by C-G formula based on Cr of 2.6).      Assessment     Principal Problem:    Sepsis, probable source is urine  Active Problems:    Acute renal failure (ARF)    UTI (urinary tract infection)    Gastroenteritis            Impression: ELLE SECONDARY TO NSAIA USE AND SEPSIS. GFR RAPIDLY IMPROVING. VOLUME EXCESS. ANEMIA. HYPOKALEMIA.             Recommendations: IV BUMEX. KCL. ? TO REHAB SOON. WE CAN F/U IN OUR [place] AFTER DISCHARGE.       Orlando Melvin MD  11/16/17  12:48 PM

## 2017-11-16 NOTE — THERAPY TREATMENT NOTE
Acute Care - Physical Therapy Treatment Note  [facility]     Patient Name: Jaclyn Garcia  : 1934  MRN: [id number]  Today's Date: 2017  Onset of Illness/Injury or Date of Surgery Date: 17  Date of Referral to PT: 11/10/17  Referring Physician: MD Romelia    Admit Date: 2017    Visit Dx:    ICD-10-CM ICD-9-CM   1. Sepsis due to Escherichia coli A41.51 038.42     995.91   2. Acute renal failure, unspecified acute renal failure type N17.9 584.9   3. Impaired functional mobility, balance, gait, and endurance Z74.09 V49.89     Patient Active Problem List   Diagnosis   • Sepsis, probable source is urine   • Acute renal failure (ARF)   • UTI (urinary tract infection)   • Gastroenteritis               Adult Rehabilitation Note       17 1330 11/15/17 0815 17 1145    Rehab Assessment/Intervention    Discipline physical therapy assistant  -UD physical therapist  -LS physical therapist  -LS    Document Type therapy note (daily note)  -UD therapy note (daily note)  -LS therapy note (daily note)  -LS    Subjective Information agree to therapy;complains of;weakness;pain  -UD agree to therapy;complains of;weakness;fatigue  -LS agree to therapy;complains of;weakness;fatigue  -LS    Patient Effort, Rehab Treatment good  -UD good  -LS good  -LS    Symptoms Noted During/After Treatment fatigue  -UD fatigue  -LS fatigue  -LS    Precautions/Limitations fall precautions;oxygen therapy device and L/min  -UD fall precautions;oxygen therapy device and L/min  -LS fall precautions;oxygen therapy device and L/min  -LS    Recorded by [UD] Bharti Masters, PTA [LS] Buffy Xiao, PT [LS] Buffy Xiao, PT    Vital Signs    Pre Systolic BP Rehab  163  -  -LS    Pre Treatment Diastolic BP  92  -LS 96  -LS    Post Systolic BP Rehab  155  -  -LS    Post Treatment Diastolic BP  94  -LS 85  -LS    Pretreatment Heart Rate (beats/min)  105  -LS 98  -LS    Posttreatment Heart Rate (beats/min)  107  -LS 95   -LS    Pre SpO2 (%)  100  -LS 99  -LS    O2 Delivery Pre Treatment  supplemental O2  -LS supplemental O2  -LS    Post SpO2 (%)  100  -LS 98  -LS    O2 Delivery Post Treatment supplemental O2  -UD supplemental O2  -LS supplemental O2  -LS    Pre Patient Position Sitting  -UD Sitting  -LS Sitting  -LS    Intra Patient Position Standing  -UD Standing  -LS Standing  -LS    Post Patient Position Supine  -UD Sitting  -LS Sitting  -LS    Recorded by [UD] Bharti Masters, PTA [LS] Buffy Xiao, PT [LS] Buffy Xiao, PT    Pain Assessment    Pain Assessment 0-10  -UD 0-10  -LS 0-10  -LS    Pain Score 2  -UD 0  -LS 0  -LS    Post Pain Score 2  -UD 0  -LS 0  -LS    Pain Type Acute pain  -UD      Pain Location Abdomen  -UD      Pain Intervention(s) Repositioned  -UD      Recorded by [UD] Bharti Masters, PTA [LS] Buffy Xiao, PT [LS] Buffy Xiao, PT    Cognitive Assessment/Intervention    Current Cognitive/Communication Assessment  functional  -LS functional  -LS    Orientation Status oriented x 4  -UD oriented x 4  -LS oriented x 4;required verbal cueing (specifiy in comments)   cues for name of hospital  -LS    Follows Commands/Answers Questions able to follow single-step instructions  -UD able to follow single-step instructions;100% of the time;needs cueing;needs increased time;needs repetition  -LS able to follow single-step instructions;100% of the time;needs cueing  -    Personal Safety  mild impairment;decreased awareness, need for assist;decreased awareness, need for safety  -LS mild impairment;decreased awareness, need for safety;decreased insight to deficits  -LS    Personal Safety Interventions fall prevention program maintained  -UD fall prevention program maintained;gait belt;nonskid shoes/slippers when out of bed  -LS fall prevention program maintained;gait belt;nonskid shoes/slippers when out of bed  -LS    Recorded by [UD] Bharti Masters, PTA [LS] Buffy Xiao, PT [LS] Buffy Xiao, PT    Bed Mobility,  Assessment/Treatment    Bed Mob, Sit to Supine, [place] minimum assist (75% patient effort)  -UD      Bed Mobility, Comment  Sitting UIC upon arrival.   -LS UIC  -LS    Recorded by [UD] Bharti Masters PTA [LS] Buffy Xiao, PT [LS] Buffy Xiao, PT    Transfer Assessment/Treatment    Transfers, Sit-Stand [place] contact guard assist;2 person assist required  -UD minimum assist (75% patient effort);2 person assist required;verbal cues required  -LS verbal cues required;moderate assist (50% patient effort);2 person assist required  -LS    Transfers, Stand-Sit [place] contact guard assist;2 person assist required  -UD minimum assist (75% patient effort);2 person assist required;verbal cues required  -LS verbal cues required;moderate assist (50% patient effort);2 person assist required  -LS    Transfers, Sit-Stand-Sit, Assist Device rolling walker  -UD rolling walker  -LS rolling walker  -LS    Toilet Transfer, [place] minimum assist (75% patient effort)  -UD      Toilet Transfer, Assistive Device rolling walker  -UD      Transfer, Impairments  strength decreased  -LS impaired balance;strength decreased  -LS    Transfer, Comment  VC's for hand placement and trunk extension to achieve upright posture.   -LS VC's for hand placement and upright posture.   -LS    Recorded by [UD] Bharti Masters PTA [LS] Buffy Xiao, PT [LS] Buffy Xiao, PT    Gait Assessment/Treatment    Gait, [place] Level contact guard assist;2 person assist required  -UD minimum assist (75% patient effort);1 person + 1 person to manage equipment;verbal cues required  -LS minimum assist (75% patient effort);1 person + 1 person to manage equipment;verbal cues required  -LS    Gait, Assistive Device rolling walker  -UD rolling walker  -LS rolling walker  -LS    Gait, Distance (Feet) 60  -  -LS 70  -LS    Gait, Gait Deviations step length decreased  -UD [place] decreased;decreased heel strike;forward flexed posture;step  length decreased  -LS [place] decreased;decreased heel strike;step length decreased  -LS    Gait, Safety Issues supplemental O2;step length decreased  -UD      Gait, Impairments strength decreased  -UD      Gait, Comment  Constant vc's for increasing step length; required significant encouragement to progress at 2 brief standing rest breaks. Followed with recliner.   -LS VC's for upright posture, increasing step length and maintaining straight pathway in hallway.  -LS    Recorded by [UD] Bharti Masters, PTA [LS] Buffy Xiao, PT [LS] Buffy Xiao, PT    Motor Skills/Interventions    Additional Documentation  Balance Skills Training (Group)  -LS Balance Skills Training (Group)  -LS    Recorded by  [LS] Buffy Xiao, PT [LS] Buffy Xiao PT    Balance Skills Training    Sitting-Level of Assistance  Contact guard  -LS Contact guard  -LS    Sitting-Balance Support  Feet supported  -LS Feet supported  -LS    Standing-Level of Assistance  Contact guard  -LS Contact guard  -LS    Static Standing Balance Support  assistive device  -LS assistive device  -LS    Gait Balance-Level of Assistance  Minimum assistance  -LS Minimum assistance  -LS    Gait Balance Support  assistive device  -LS assistive device  -LS    Recorded by  [LS] Buffy Xiao, PT [LS] Buffy Xiao, PT    Therapy Exercises    Bilateral Lower Extremities AROM:;10 reps;sitting  -UD AROM:;10 reps;sitting;ankle pumps/circles;hip flexion;LAQ  -LS AROM:;10 reps;sitting;ankle pumps/circles;quad sets;LAQ  -LS    Bilateral Upper Extremity  AAROM:;10 reps;sitting;elbow flexion/extension;shoulder extension/flexion;hand pumps  -LS     Recorded by [UD] Bharti Masters, PTA [LS] Buffy Xiao, PT [LS] Buffy Xiao, PT    Positioning and Restraints    Pre-Treatment Position sitting in chair/recliner  -UD sitting in chair/recliner  -LS sitting in chair/recliner  -LS    Post Treatment Position bed  -UD chair  -LS chair  -LS    In Bed notified nsg;supine;call light within  reach;exit alarm on;with family/caregiver;side rails up x2  -UD      In Chair  notified nsg;reclined;call light within reach;encouraged to call for assist;exit alarm on;RUE elevated;LUE elevated;legs elevated;heels elevated  -LS notified nsg;reclined;call light within reach;encouraged to call for assist;with family/caregiver;RUE elevated;LUE elevated;legs elevated;heels elevated  -LS    Recorded by [UD] Bharti Masters, PTA [LS] Buffy Xiao, PT [LS] Buffy Xiao, PT      User Key  (r) = Recorded By, (t) = Taken By, (c) = Cosigned By    Initials Name Effective Dates    UD Bharti Masters, PTA 06/22/15 -     LS Buffy Xiao, PT 06/19/15 -                 IP PT Goals       11/16/17 1407 11/15/17 0923 11/14/17 1213    Bed Mobility PT LTG    Bed Mobility PT LTG, Outcome goal ongoing  -UD goal ongoing  -LS goal ongoing  -LS    Transfer Training PT LTG    Transfer Training PT LTG, Outcome goal ongoing  -UD goal ongoing  -LS goal ongoing  -LS    Gait Training PT LTG    Gait Training Goal PT LTG, Outcome goal ongoing  -UD goal ongoing  -LS goal ongoing  -LS    Stair Training PT LTG    Stair Training Goal PT LTG, Outcome goal ongoing  -UD goal ongoing  -LS goal ongoing  -LS      11/13/17 1006          Bed Mobility PT LTG    Bed Mobility PT LTG, Date Established 11/13/17  -LS      Bed Mobility PT LTG, Time to Achieve 2 wks  -LS      Bed Mobility PT LTG, Activity Type supine to sit/sit to supine  -LS      Bed Mobility PT LTG, [place] Level supervision required  -LS      Transfer Training PT LTG    Transfer Training PT LTG, Date Established 11/13/17  -LS      Transfer Training PT LTG, Time to Achieve 2 wks  -LS      Transfer Training PT LTG, Activity Type sit to stand/stand to sit  -LS      Transfer Training PT LTG, [place] Level supervision required  -LS      Transfer Training PT LTG, Assist Device walker, rolling  -LS      Gait Training PT LTG    Gait Training Goal PT LTG, Date Established 11/13/17  -LS      Gait  Training Goal PT LTG, Time to Achieve 2 wks  -LS      Gait Training Goal PT LTG, [place] Level supervision required  -LS      Gait Training Goal PT LTG, Assist Device walker, rolling  -LS      Gait Training Goal PT LTG, Distance to Achieve 200  -LS      Stair Training PT LTG    Stair Training Goal PT LTG, Date Established 11/13/17  -LS      Stair Training Goal PT LTG, Time to Achieve 2 wks  -LS      Stair Training Goal PT LTG, Number of Steps 2  -LS      Stair Training Goal PT LTG, [place] Level contact guard assist  -LS        User Key  (r) = Recorded By, (t) = Taken By, (c) = Cosigned By    Initials Name Provider Type    UD Bharti Masters, PTA Physical Therapy Assistant    LS Buffy Xiao, PT Physical Therapist          Physical Therapy Education     Title: PT OT SLP Therapies (Active)     Topic: Physical Therapy (Active)     Point: Mobility training (Active)    Learning Progress Summary    Learner Readiness Method Response Comment Documented by Status   Patient Acceptance E,D DU,NR   11/16/17 1407 Done    Acceptance E,D NR Discussed at length benefit of progressing mobility to promote PLOF. Needs encouragement.  11/15/17 0922 Active    Acceptance E VU,NR   11/14/17 2348 Done    Acceptance E,D NR   11/14/17 1213 Active    Acceptance E VU,NR   11/14/17 0414 Done    Acceptance E,D NR   11/13/17 1005 Active   Family Acceptance E,D DU,NR   11/16/17 1407 Done    Acceptance E VU,NR   11/14/17 2348 Done    Acceptance E VU,NR   11/14/17 0414 Done    Acceptance E,D NR   11/13/17 1005 Active   Significant Other Acceptance E,D NR   11/14/17 1213 Active               Point: Home exercise program (Active)    Learning Progress Summary    Learner Readiness Method Response Comment Documented by Status   Patient Acceptance E,D DU,NR  UD 11/16/17 1407 Done    Acceptance E,D NR Discussed at length benefit of progressing mobility to promote PLOF. Needs encouragement.  11/15/17 0922 Active     Acceptance E VU,NR   11/14/17 2348 Done    Acceptance E,D NR   11/14/17 1213 Active    Acceptance E VU,NR   11/14/17 0414 Done   Family Acceptance E,D DU,NR  UD 11/16/17 1407 Done    Acceptance E VU,NR   11/14/17 2348 Done    Acceptance E VU,NR   11/14/17 0414 Done   Significant Other Acceptance E,D NR  LS 11/14/17 1213 Active               Point: Body mechanics (Active)    Learning Progress Summary    Learner Readiness Method Response Comment Documented by Status   Patient Acceptance E,D DU,NR  UD 11/16/17 1407 Done    Acceptance E,D NR Discussed at length benefit of progressing mobility to promote PLOF. Needs encouragement.  11/15/17 0922 Active    Acceptance E VU,NR   11/14/17 2348 Done    Acceptance E,D NR   11/14/17 1213 Active    Acceptance E VU,NR   11/14/17 0414 Done    Acceptance E,D NR   11/13/17 1005 Active   Family Acceptance E,D DU,NR   11/16/17 1407 Done    Acceptance E VU,NR   11/14/17 2348 Done    Acceptance E VU,NR   11/14/17 0414 Done    Acceptance E,D NR   11/13/17 1005 Active   Significant Other Acceptance E,D NR   11/14/17 1213 Active               Point: Precautions (Active)    Learning Progress Summary    Learner Readiness Method Response Comment Documented by Status   Patient Acceptance E,D DU,NR  UD 11/16/17 1407 Done    Acceptance E,D NR Discussed at length benefit of progressing mobility to promote PLOF. Needs encouragement.  11/15/17 0922 Active    Acceptance E VU,NR   11/14/17 2348 Done    Acceptance E,D NR   11/14/17 1213 Active    Acceptance E VU,NR   11/14/17 0414 Done    Acceptance E,D NR   11/13/17 1005 Active   Family Acceptance E,D DU,NR  UD 11/16/17 1407 Done    Acceptance E VU,NR   11/14/17 2348 Done    Acceptance E VU,NR   11/14/17 0414 Done    Acceptance E,D NR   11/13/17 1005 Active   Significant Other Acceptance E,D NR   11/14/17 1213 Active                      User Key     Initials Effective Dates Name Provider Type  Discipline    UD 06/22/15 -  Bharti Masters PTA Physical Therapy Assistant PT     06/19/15 -  Buffy Xiao, PT Physical Therapist PT    AC 07/03/17 -  Claudia Dietrich, RN Registered Nurse Nurse                    PT Recommendation and Plan  Anticipated Discharge Disposition: skilled nursing facility  PT Frequency: daily  Plan of Care Review  Plan Of Care Reviewed With: patient, family  Progress: progress toward functional goals is gradual  Outcome Summary/Follow up Plan: pt up in chair,fatigue.ambulat 60 ft only,did exercises sitting in chair          Outcome Measures       11/16/17 1330 11/15/17 0815 11/14/17 1145    How much help from another person do you currently need...    Turning from your back to your side while in flat bed without using bedrails? 3  -UD 3  -LS 3  -LS    Moving from lying on back to sitting on the side of a flat bed without bedrails? 3  -UD 2  -LS 2  -LS    Moving to and from a bed to a chair (including a wheelchair)? 3  -UD 3  -LS 3  -LS    Standing up from a chair using your arms (e.g., wheelchair, bedside chair)? 3  -UD 3  -LS 2  -LS    Climbing 3-5 steps with a railing? 2  -UD 1  -LS 1  -LS    To walk in hospital room? 3  -UD 3  -LS 3  -LS    AM-PAC 6 Clicks Score 17  -UD 15  -LS 14  -LS    Functional Assessment    Outcome Measure Options  AM-PAC 6 Clicks Basic Mobility (PT)  -LS AM-PAC 6 Clicks Basic Mobility (PT)  -LS      User Key  (r) = Recorded By, (t) = Taken By, (c) = Cosigned By    Initials Name Provider Type    VIELKA Masters PTA Physical Therapy Assistant     Buffy Xiao, PT Physical Therapist           Time Calculation:         PT Charges       11/16/17 1409          Time Calculation    PT Received On 11/16/17  -UD      PT Goal Re-Cert Due Date 11/23/17  -UD      Time Calculation- PT    Total Timed Code Minutes- PT 24 minute(s)  -        User Key  (r) = Recorded By, (t) = Taken By, (c) = Cosigned By    Initials Name Provider Type    VIELKA Masters PTA Physical  Therapy Assistant          Therapy Charges for Today     Code Description Service Date Service Provider Modifiers Qty    41060641040 HC PT THER PROC EA 15 MIN 11/16/2017 Bharti Masters, PTA GP 1    45781817677 HC GAIT TRAINING EA 15 MIN 11/16/2017 Bharti Masters, PTA GP 1    87589108350 HC PT THER SUPP EA 15 MIN 11/16/2017 Bharti Masters, PTA GP 1          PT G-Codes  Outcome Measure Options: AM-PAC 6 Clicks Basic Mobility (PT)    Bharti Masters PTA  11/16/2017

## 2017-11-17 LAB
ABO GROUP BLD: NORMAL
ALBUMIN SERPL-MCNC: 3.2 G/DL (ref 3.2–4.8)
ANION GAP SERPL CALCULATED.3IONS-SCNC: 9 MMOL/L (ref 3–11)
BACTERIA UR QL AUTO: ABNORMAL /HPF
BASOPHILS # BLD AUTO: 0.12 10*3/MM3 (ref 0–0.2)
BASOPHILS NFR BLD AUTO: 0.8 % (ref 0–1)
BILIRUB UR QL STRIP: NEGATIVE
BLD GP AB SCN SERPL QL: NEGATIVE
BNP SERPL-MCNC: 90 PG/ML (ref 0–100)
BUN BLD-MCNC: 33 MG/DL (ref 9–23)
BUN/CREAT SERPL: 15 (ref 7–25)
CALCIUM SPEC-SCNC: 8.7 MG/DL (ref 8.7–10.4)
CHLORIDE SERPL-SCNC: 107 MMOL/L (ref 99–109)
CLARITY UR: CLEAR
CO2 SERPL-SCNC: 25 MMOL/L (ref 20–31)
COLOR UR: YELLOW
CREAT BLD-MCNC: 2.2 MG/DL (ref 0.6–1.3)
D-LACTATE SERPL-SCNC: 1 MMOL/L (ref 0.5–2)
DEPRECATED RDW RBC AUTO: 54 FL (ref 37–54)
EOSINOPHIL # BLD AUTO: 0.35 10*3/MM3 (ref 0–0.3)
EOSINOPHIL NFR BLD AUTO: 2.4 % (ref 0–3)
ERYTHROCYTE [DISTWIDTH] IN BLOOD BY AUTOMATED COUNT: 15.8 % (ref 11.3–14.5)
FERRITIN SERPL-MCNC: 1308 NG/ML (ref 10–291)
FOLATE SERPL-MCNC: 5.5 NG/ML (ref 3.2–20)
GFR SERPL CREATININE-BSD FRML MDRD: 21 ML/MIN/1.73
GLUCOSE BLD-MCNC: 114 MG/DL (ref 70–100)
GLUCOSE UR STRIP-MCNC: NEGATIVE MG/DL
HCT VFR BLD AUTO: 17.9 % (ref 34.5–44)
HCT VFR BLD AUTO: 24.8 % (ref 34.5–44)
HGB BLD-MCNC: 5.7 G/DL (ref 11.5–15.5)
HGB BLD-MCNC: 8 G/DL (ref 11.5–15.5)
HGB UR QL STRIP.AUTO: ABNORMAL
HYALINE CASTS UR QL AUTO: ABNORMAL /LPF
IMM GRANULOCYTES # BLD: 0.18 10*3/MM3 (ref 0–0.03)
IMM GRANULOCYTES NFR BLD: 1.2 % (ref 0–0.6)
INR PPP: 1.21
IRON 24H UR-MRATE: 37 MCG/DL (ref 50–175)
IRON SATN MFR SERPL: 20 % (ref 15–50)
KETONES UR QL STRIP: NEGATIVE
LDH SERPL-CCNC: 163 U/L (ref 120–246)
LEUKOCYTE ESTERASE UR QL STRIP.AUTO: ABNORMAL
LYMPHOCYTES # BLD AUTO: 2.02 10*3/MM3 (ref 0.6–4.8)
LYMPHOCYTES NFR BLD AUTO: 13.9 % (ref 24–44)
MAGNESIUM SERPL-MCNC: 1.7 MG/DL (ref 1.3–2.7)
MCH RBC QN AUTO: 32 PG (ref 27–31)
MCHC RBC AUTO-ENTMCNC: 31.8 G/DL (ref 32–36)
MCV RBC AUTO: 100.6 FL (ref 80–99)
MONOCYTES # BLD AUTO: 1.16 10*3/MM3 (ref 0–1)
MONOCYTES NFR BLD AUTO: 8 % (ref 0–12)
NEUTROPHILS # BLD AUTO: 10.74 10*3/MM3 (ref 1.5–8.3)
NEUTROPHILS NFR BLD AUTO: 73.7 % (ref 41–71)
NITRITE UR QL STRIP: NEGATIVE
PH UR STRIP.AUTO: 6.5 [PH] (ref 5–8)
PHOSPHATE SERPL-MCNC: 3.9 MG/DL (ref 2.4–5.1)
PLATELET # BLD AUTO: 286 10*3/MM3 (ref 150–450)
PMV BLD AUTO: 9.8 FL (ref 6–12)
POTASSIUM BLD-SCNC: 3.8 MMOL/L (ref 3.5–5.5)
PROT UR QL STRIP: ABNORMAL
PROTHROMBIN TIME: 13.3 SECONDS (ref 9.6–11.5)
RBC # BLD AUTO: 1.78 10*6/MM3 (ref 3.89–5.14)
RBC # UR: ABNORMAL /HPF
REF LAB TEST METHOD: ABNORMAL
RETICS/RBC NFR AUTO: 6.04 % (ref 0.5–1.5)
RH BLD: POSITIVE
SODIUM BLD-SCNC: 141 MMOL/L (ref 132–146)
SP GR UR STRIP: <=1.005 (ref 1–1.03)
SQUAMOUS #/AREA URNS HPF: ABNORMAL /HPF
TIBC SERPL-MCNC: 186 MCG/DL (ref 250–450)
UROBILINOGEN UR QL STRIP: ABNORMAL
VIT B12 BLD-MCNC: 690 PG/ML (ref 211–911)
WBC NRBC COR # BLD: 14.57 10*3/MM3 (ref 3.5–10.8)
WBC UR QL AUTO: ABNORMAL /HPF

## 2017-11-17 PROCEDURE — 86900 BLOOD TYPING SEROLOGIC ABO: CPT

## 2017-11-17 PROCEDURE — P9016 RBC LEUKOCYTES REDUCED: HCPCS

## 2017-11-17 PROCEDURE — 85025 COMPLETE CBC W/AUTO DIFF WBC: CPT | Performed by: INTERNAL MEDICINE

## 2017-11-17 PROCEDURE — 85045 AUTOMATED RETICULOCYTE COUNT: CPT | Performed by: NURSE PRACTITIONER

## 2017-11-17 PROCEDURE — 83605 ASSAY OF LACTIC ACID: CPT | Performed by: NURSE PRACTITIONER

## 2017-11-17 PROCEDURE — 25010000002 NA FERRIC GLUC CPLX PER 12.5 MG: Performed by: INTERNAL MEDICINE

## 2017-11-17 PROCEDURE — 36430 TRANSFUSION BLD/BLD COMPNT: CPT

## 2017-11-17 PROCEDURE — 86900 BLOOD TYPING SEROLOGIC ABO: CPT | Performed by: INTERNAL MEDICINE

## 2017-11-17 PROCEDURE — 80069 RENAL FUNCTION PANEL: CPT | Performed by: INTERNAL MEDICINE

## 2017-11-17 PROCEDURE — 99233 SBSQ HOSP IP/OBS HIGH 50: CPT | Performed by: INTERNAL MEDICINE

## 2017-11-17 PROCEDURE — 81001 URINALYSIS AUTO W/SCOPE: CPT | Performed by: NURSE PRACTITIONER

## 2017-11-17 PROCEDURE — 25010000002 FUROSEMIDE PER 20 MG: Performed by: INTERNAL MEDICINE

## 2017-11-17 PROCEDURE — 87086 URINE CULTURE/COLONY COUNT: CPT | Performed by: NURSE PRACTITIONER

## 2017-11-17 PROCEDURE — 85014 HEMATOCRIT: CPT | Performed by: INTERNAL MEDICINE

## 2017-11-17 PROCEDURE — 83550 IRON BINDING TEST: CPT | Performed by: NURSE PRACTITIONER

## 2017-11-17 PROCEDURE — 83735 ASSAY OF MAGNESIUM: CPT | Performed by: INTERNAL MEDICINE

## 2017-11-17 PROCEDURE — 82728 ASSAY OF FERRITIN: CPT | Performed by: NURSE PRACTITIONER

## 2017-11-17 PROCEDURE — 86923 COMPATIBILITY TEST ELECTRIC: CPT

## 2017-11-17 PROCEDURE — 83880 ASSAY OF NATRIURETIC PEPTIDE: CPT | Performed by: NURSE PRACTITIONER

## 2017-11-17 PROCEDURE — 85610 PROTHROMBIN TIME: CPT | Performed by: INTERNAL MEDICINE

## 2017-11-17 PROCEDURE — 82607 VITAMIN B-12: CPT | Performed by: NURSE PRACTITIONER

## 2017-11-17 PROCEDURE — 83615 LACTATE (LD) (LDH) ENZYME: CPT | Performed by: NURSE PRACTITIONER

## 2017-11-17 PROCEDURE — 85018 HEMOGLOBIN: CPT | Performed by: INTERNAL MEDICINE

## 2017-11-17 PROCEDURE — 86901 BLOOD TYPING SEROLOGIC RH(D): CPT | Performed by: INTERNAL MEDICINE

## 2017-11-17 PROCEDURE — 25010000002 CEFTRIAXONE PER 250 MG: Performed by: INTERNAL MEDICINE

## 2017-11-17 PROCEDURE — 82746 ASSAY OF FOLIC ACID SERUM: CPT | Performed by: NURSE PRACTITIONER

## 2017-11-17 PROCEDURE — 86850 RBC ANTIBODY SCREEN: CPT | Performed by: INTERNAL MEDICINE

## 2017-11-17 PROCEDURE — 83540 ASSAY OF IRON: CPT | Performed by: NURSE PRACTITIONER

## 2017-11-17 RX ORDER — FUROSEMIDE 10 MG/ML
40 INJECTION INTRAMUSCULAR; INTRAVENOUS DAILY
Status: DISCONTINUED | OUTPATIENT
Start: 2017-11-17 | End: 2017-11-19

## 2017-11-17 RX ORDER — IPRATROPIUM BROMIDE AND ALBUTEROL SULFATE 2.5; .5 MG/3ML; MG/3ML
3 SOLUTION RESPIRATORY (INHALATION) EVERY 6 HOURS PRN
Status: DISCONTINUED | OUTPATIENT
Start: 2017-11-17 | End: 2017-11-21 | Stop reason: HOSPADM

## 2017-11-17 RX ORDER — PANTOPRAZOLE SODIUM 40 MG/1
40 TABLET, DELAYED RELEASE ORAL
Status: DISCONTINUED | OUTPATIENT
Start: 2017-11-17 | End: 2017-11-21 | Stop reason: HOSPADM

## 2017-11-17 RX ADMIN — SODIUM CHLORIDE 125 MG: 9 INJECTION, SOLUTION INTRAVENOUS at 08:46

## 2017-11-17 RX ADMIN — Medication 5 MG: at 21:17

## 2017-11-17 RX ADMIN — CEFTRIAXONE SODIUM 1 G: 1 INJECTION, SOLUTION INTRAVENOUS at 14:01

## 2017-11-17 RX ADMIN — PANTOPRAZOLE SODIUM 40 MG: 40 TABLET, DELAYED RELEASE ORAL at 17:19

## 2017-11-17 RX ADMIN — ALPRAZOLAM 0.25 MG: 0.25 TABLET ORAL at 18:34

## 2017-11-17 RX ADMIN — PANTOPRAZOLE SODIUM 40 MG: 40 TABLET, DELAYED RELEASE ORAL at 10:29

## 2017-11-17 RX ADMIN — FUROSEMIDE 40 MG: 10 INJECTION, SOLUTION INTRAMUSCULAR; INTRAVENOUS at 08:49

## 2017-11-17 NOTE — DISCHARGE PLACEMENT REQUEST
"Case management [phone]  Needs short term skilled bed.  Blake Davenport (83 y.o. Female)     Date of Birth Social Security Number Address Home Phone MRN    [id number]  [id number] [facility] [phone] [id number]    [employer] Marital Status          Evangel[employer]        Admission Date Admission Type Admitting Provider Attending Provider Department, Room/Bed    11/8/17 Elective Dangelo Lopez MD [place], Masoud, MD [facility] 4H, S479/1    Discharge Date Discharge Disposition Discharge Destination                      Attending Provider: Dangelo Lopez MD     Allergies:  Niacin And Related    Isolation:  None   Infection:  None   Code Status:  Conditional    Ht:  63\" (160 cm)   Wt:  156 lb 3.2 oz (70.9 kg)    Admission Cmt:  None   Principal Problem:  Sepsis, probable source is urine [A41.9]                 Active Insurance as of 11/8/2017     Primary Coverage     Payor Plan Insurance Group Employer/Plan Group    [facility] MEDICARE REPLACEMENT [place] [id number]     Payor Plan Address Payor Plan Phone Number Effective From Effective To    [address] [address]  6/1/2017     [place] [address]       Subscriber Name Subscriber Birth Date Member ID       BLAKE DAVENPORT 1934 [id number]                 Emergency Contacts      (Rel.) Home Phone Work Phone Mobile Phone    Ralph Davenport (Spouse) [phone] -- [phone]    Gus Davenport (Son) -- -- [phone]               History & Physical      Finesse Yost MD at 11/8/2017  9:04 PM          INTENSIVIST / PULMONARY INITIAL VISIT (CONSULT / H&P) NOTE     Hospital:  LOS: 0 days   Ms. Blake Davenport, 83 y.o. female is followed for:   No chief complaint on file.    Principal Problem:    Sepsis  Active Problems:    Acute renal failure (ARF)         History of Present Illness   84 y/o WF w/ h/o HTN, HLD, GERD, diuretic use (Triamterene/HCTZ), lifetime non-smoker, who presented " to [facility] 11/6 after having several days of profuse watery diarrhea.  She also had weakness, decreased po intake, and continued to take her diuretics for HTN.  She also reported taking aleve but couldn't tell me when.  She was found to have E. Coli UTI and GNR bacteremia.  She was felt to be in septic shock and was treated with pip-tazo.  She also received vancomycin at one point.  She was given IVF, and apparently today had to be transferred to ICU on bipap with dopamine gtt and bumex gtt.  We were called to accept patient for possible CRRT.  Note all history basically comes from the chart which is very sparse, nor did I receive a direct report.  Patient is confused and can give a limited history.    Notable studies performed at [facility] showed:    No fevers during stay, multiple low BP's documented HD#1    Labs:  WBC 14.3, Bicarb 15, k 4.0, BUN 53, Cr 4.7, ABG 7.221/31.7/84 BE -15 on 3 lpm, Cortisol 15.8, Ur Cr 87.3, Ur Prot 289, UA Large blood, neg nit, large LE, > 100 WBC, > 100 RBC    Imaging:  Renal U/S - no hydro, echogenic right kidney 12.0 cm renal cortex c/w intrinsic medical renal disease, right 2.1 cm uncomplicated cyst.  Left kidney normal, 10.7 cm.    CXR: LLL infiltrate, pulmonary edema    Echo:  EF 60-65%, no WMA, Grade 3 Diastolic Dysfunction, PASP 50, valves normal, no effusion    CT Abdomen:  Left lower lobe atelectasis vs pneumonia as well as perinephric stranding.  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     RUQ U/S - Distended GB w/ sludge, no stones or biliary dilatation      No past medical history on file.  No past surgical history on file.  No family history on file.  Social History     Social History   • Marital status:      Spouse name: N/A   • Number of children: N/A   • Years of education: N/A     Occupational History   • Not on file.     Social History Main Topics   • Smoking status: Not on file    • Smokeless tobacco: Not on file   • Alcohol use Not on file   • Drug use: Not on file   • Sexual activity: Not on file     Other Topics Concern   • Not on file     Social History Narrative     Allergies   Allergen Reactions   • Niacin And Related      No current facility-administered medications on file prior to encounter.      No current outpatient prescriptions on file prior to encounter.       ROS:  Per HPI, all other systems were reviewed and were negative        OBJECTIVE     Vitals:    11/08/17 2020   BP: 99/71   Pulse: 86   Temp:    SpO2: 100%     General Appearance:  Conversant, in no acute distress  Eyes:  No scleral icterus or pallor, PERRLA  Ears, Nose, Mouth, Throat:  Atraumatic, oropharynx clear  Neck:  Trachea midline, thyroid normal  Respiratory:  Clear to auscultation bilaterally, normal effort, no tenderness to palpation  Cardiovascular:  Regular rate and rhythm, no murmurs, no peripheral edema, no thrill  Gastrointestinal:  Mildly firm, non-tender, non-distended, no hepatosplenomegaly  Skin:  Normal temperature, no rash  Psychiatric:  Mildly confused, able to answer most questions appropriately  Neuro:  No new focal neurologic deficits observed    Relevant imaging studies and labs from 11/08/17 were reviewed and interpreted by me    Assessment/Plan   IMPRESSION / PLAN     Inpatient Problem List:  83 y.o.female:  Principal Problem:    Sepsis  Active Problems:    Acute renal failure (ARF)       Impression:  83 y.o.female with relevant PMH of HLD, HTN (on triamterene/HCTZ) admitted to [facility] 11/6 w/ several days of profuse watery diarrhea, confusion, ELLE, and shock.  Overall c/w gastroenteritis that developed into UTI and Urosepsis with E. Coli in Urine and GNR bacteremia (culture pending).  ELLE complicated by Diastolic HF, iatrogenic fluid overload, continued diuretic use despite volume depletion from diarrhea,  And septic shock with probably some component of ATN.    Plan:  -ELLE - consult  nephrology, bedside ultrasound to assess volume status.  I was not able to visualize IVC but her RIJ completely collapsed with inspiration suggesting continued volume depletion.  Will restart bicarb gtt, hold diuresis.  Nephrology consult.  May end up needing Dialysis.  New omer placed.    -Diastolic HF Grade 3 - may complicate fluid resuscitation, monitor for HTN / pulmonary edema / etc    -E. Coli Urosepsis / GNR Bacteremia - no sensitivities, no risk factors for ESBL.  Continue Pip-Tazo, check vanco level, d/c vancomycin, re-check blood cultures.  On very low dose of levophed, will start po midodrine and try to wean it off overnight.  Cortisol at OSH normal.    -follow up labs    -SQ Heparin, SCD's    -Renal Diet    Critical care time: 60 min       Finesse Yost MD  Intensive Care Medicine  17 9:04 PM          Electronically signed by Finesse Yost MD at 2017  9:44 PM           Physical Therapy Notes (most recent note)      Bharti Masters PTA at 2017  2:10 PM  Version 1 of 1         Acute Care - Physical Therapy Treatment Note  [facility]     Patient Name: Jaclyn Garcia  : 1934  MRN: [id number]  Today's Date: 2017  Onset of Illness/Injury or Date of Surgery Date: 17  Date of Referral to PT: 11/10/17  Referring Physician: MD Romelia    Admit Date: 2017    Visit Dx:    ICD-10-CM ICD-9-CM   1. Sepsis due to Escherichia coli A41.51 038.42     995.91   2. Acute renal failure, unspecified acute renal failure type N17.9 584.9   3. Impaired functional mobility, balance, gait, and endurance Z74.09 V49.89     Patient Active Problem List   Diagnosis   • Sepsis, probable source is urine   • Acute renal failure (ARF)   • UTI (urinary tract infection)   • Gastroenteritis               Adult Rehabilitation Note       17 1330 11/15/17 0815 17 1145    Rehab Assessment/Intervention    Discipline physical therapy assistant  -VIELKA physical therapist  -LS  physical therapist  -LS    Document Type therapy note (daily note)  -UD therapy note (daily note)  -LS therapy note (daily note)  -LS    Subjective Information agree to therapy;complains of;weakness;pain  -UD agree to therapy;complains of;weakness;fatigue  -LS agree to therapy;complains of;weakness;fatigue  -LS    Patient Effort, Rehab Treatment good  -UD good  -LS good  -LS    Symptoms Noted During/After Treatment fatigue  -UD fatigue  -LS fatigue  -LS    Precautions/Limitations fall precautions;oxygen therapy device and L/min  -UD fall precautions;oxygen therapy device and L/min  -LS fall precautions;oxygen therapy device and L/min  -LS    Recorded by [UD] Bharti Masters PTA [LS] Buffy Xiao, PT [LS] Buffy Xiao, PT    Vital Signs    Pre Systolic BP Rehab  163  -  -LS    Pre Treatment Diastolic BP  92  -LS 96  -LS    Post Systolic BP Rehab  155  -  -LS    Post Treatment Diastolic BP  94  -LS 85  -LS    Pretreatment Heart Rate (beats/min)  105  -LS 98  -LS    Posttreatment Heart Rate (beats/min)  107  -LS 95  -LS    Pre SpO2 (%)  100  -LS 99  -LS    O2 Delivery Pre Treatment  supplemental O2  -LS supplemental O2  -LS    Post SpO2 (%)  100  -LS 98  -LS    O2 Delivery Post Treatment supplemental O2  -UD supplemental O2  -LS supplemental O2  -LS    Pre Patient Position Sitting  -UD Sitting  -LS Sitting  -LS    Intra Patient Position Standing  -UD Standing  -LS Standing  -LS    Post Patient Position Supine  -UD Sitting  -LS Sitting  -LS    Recorded by [UD] Bharti Masters PTA [LS] Buffy Xiao, PT [LS] Buffy Xiao, PT    Pain Assessment    Pain Assessment 0-10  -UD 0-10  -LS 0-10  -LS    Pain Score 2  -UD 0  -LS 0  -LS    Post Pain Score 2  -UD 0  -LS 0  -LS    Pain Type Acute pain  -UD      Pain Location Abdomen  -UD      Pain Intervention(s) Repositioned  -UD      Recorded by [UD] Bharti Masters PTA [LS] Buffy Xiao, PT [LS] Buffy Xiao, PT    Cognitive Assessment/Intervention    Current  Cognitive/Communication Assessment  functional  -LS functional  -LS    Orientation Status oriented x 4  -UD oriented x 4  -LS oriented x 4;required verbal cueing (specifiy in comments)   cues for name of hospital  -LS    Follows Commands/Answers Questions able to follow single-step instructions  -UD able to follow single-step instructions;100% of the time;needs cueing;needs increased time;needs repetition  -LS able to follow single-step instructions;100% of the time;needs cueing  -LS    Personal Safety  mild impairment;decreased awareness, need for assist;decreased awareness, need for safety  -LS mild impairment;decreased awareness, need for safety;decreased insight to deficits  -LS    Personal Safety Interventions fall prevention program maintained  -UD fall prevention program maintained;gait belt;nonskid shoes/slippers when out of bed  -LS fall prevention program maintained;gait belt;nonskid shoes/slippers when out of bed  -LS    Recorded by [UD] Bharti Masters, PTA [LS] Buffy Xiao, PT [LS] Buffy Xiao, PT    Bed Mobility, Assessment/Treatment    Bed Mob, Sit to Supine, [place] minimum assist (75% patient effort)  -UD      Bed Mobility, Comment  Sitting UIC upon arrival.   -LS UIC  -LS    Recorded by [UD] Bharti Masters, PTA [LS] Buffy Xiao, PT [LS] Buffy Xiao, PT    Transfer Assessment/Treatment    Transfers, Sit-Stand [place] contact guard assist;2 person assist required  -UD minimum assist (75% patient effort);2 person assist required;verbal cues required  -LS verbal cues required;moderate assist (50% patient effort);2 person assist required  -LS    Transfers, Stand-Sit [place] contact guard assist;2 person assist required  -UD minimum assist (75% patient effort);2 person assist required;verbal cues required  -LS verbal cues required;moderate assist (50% patient effort);2 person assist required  -LS    Transfers, Sit-Stand-Sit, Assist Device rolling walker  -UD rolling walker  -LS rolling  walker  -LS    Toilet Transfer, [place] minimum assist (75% patient effort)  -UD      Toilet Transfer, Assistive Device rolling walker  -UD      Transfer, Impairments  strength decreased  -LS impaired balance;strength decreased  -LS    Transfer, Comment  VC's for hand placement and trunk extension to achieve upright posture.   -LS VC's for hand placement and upright posture.   -LS    Recorded by [UD] Bharti Masters PTA [LS] Buffy Xiao, PT [LS] Buffy Xiao, PT    Gait Assessment/Treatment    Gait, [place] Level contact guard assist;2 person assist required  -UD minimum assist (75% patient effort);1 person + 1 person to manage equipment;verbal cues required  -LS minimum assist (75% patient effort);1 person + 1 person to manage equipment;verbal cues required  -LS    Gait, Assistive Device rolling walker  -UD rolling walker  -LS rolling walker  -LS    Gait, Distance (Feet) 60  -  -LS 70  -LS    Gait, Gait Deviations step length decreased  -UD [place] decreased;decreased heel strike;forward flexed posture;step length decreased  -LS [place] decreased;decreased heel strike;step length decreased  -LS    Gait, Safety Issues supplemental O2;step length decreased  -UD      Gait, Impairments strength decreased  -UD      Gait, Comment  Constant vc's for increasing step length; required significant encouragement to progress at 2 brief standing rest breaks. Followed with recliner.   -LS VC's for upright posture, increasing step length and maintaining straight pathway in hallway.  -LS    Recorded by [UD] Bharti Masters PTA [LS] Buffy Xiao, PT [LS] Buffy Xiao, PT    Motor Skills/Interventions    Additional Documentation  Balance Skills Training (Group)  -LS Balance Skills Training (Group)  -LS    Recorded by  [LS] Buffy Xiao, PT [LS] Buffy Xiao, PT    Balance Skills Training    Sitting-Level of Assistance  Contact guard  -LS Contact guard  -LS    Sitting-Balance Support  Feet supported  -LS Feet  supported  -LS    Standing-Level of Assistance  Contact guard  -LS Contact guard  -LS    Static Standing Balance Support  assistive device  -LS assistive device  -LS    Gait Balance-Level of Assistance  Minimum assistance  -LS Minimum assistance  -LS    Gait Balance Support  assistive device  -LS assistive device  -LS    Recorded by  [LS] Buffy Xiao, PT [LS] Buffy Xiao, PT    Therapy Exercises    Bilateral Lower Extremities AROM:;10 reps;sitting  -UD AROM:;10 reps;sitting;ankle pumps/circles;hip flexion;LAQ  -LS AROM:;10 reps;sitting;ankle pumps/circles;quad sets;LAQ  -LS    Bilateral Upper Extremity  AAROM:;10 reps;sitting;elbow flexion/extension;shoulder extension/flexion;hand pumps  -LS     Recorded by [UD] Bharti Masters, PTA [LS] Buffy Xiao, PT [LS] Buffy Xiao, PT    Positioning and Restraints    Pre-Treatment Position sitting in chair/recliner  -UD sitting in chair/recliner  -LS sitting in chair/recliner  -LS    Post Treatment Position bed  -UD chair  -LS chair  -LS    In Bed notified nsg;supine;call light within reach;exit alarm on;with family/caregiver;side rails up x2  -UD      In Chair  notified nsg;reclined;call light within reach;encouraged to call for assist;exit alarm on;RUE elevated;LUE elevated;legs elevated;heels elevated  -LS notified nsg;reclined;call light within reach;encouraged to call for assist;with family/caregiver;RUE elevated;LUE elevated;legs elevated;heels elevated  -LS    Recorded by [UD] Bharti Masters, PTA [LS] Buffy Xiao, PT [LS] Buffy Xiao, PT      User Key  (r) = Recorded By, (t) = Taken By, (c) = Cosigned By    Initials Name Effective Dates    UD Bharti Masters PTA 06/22/15 -     LS Buffy Xiao, PT 06/19/15 -                 IP PT Goals       11/16/17 1407 11/15/17 0923 11/14/17 1213    Bed Mobility PT LTG    Bed Mobility PT LTG, Outcome goal ongoing  -UD goal ongoing  -LS goal ongoing  -LS    Transfer Training PT LTG    Transfer Training PT LTG, Outcome goal  ongoing  -UD goal ongoing  -LS goal ongoing  -LS    Gait Training PT LTG    Gait Training Goal PT LTG, Outcome goal ongoing  -UD goal ongoing  -LS goal ongoing  -LS    Stair Training PT LTG    Stair Training Goal PT LTG, Outcome goal ongoing  -UD goal ongoing  -LS goal ongoing  -LS      11/13/17 1006          Bed Mobility PT LTG    Bed Mobility PT LTG, Date Established 11/13/17  -LS      Bed Mobility PT LTG, Time to Achieve 2 wks  -LS      Bed Mobility PT LTG, Activity Type supine to sit/sit to supine  -LS      Bed Mobility PT LTG, [place] Level supervision required  -LS      Transfer Training PT LTG    Transfer Training PT LTG, Date Established 11/13/17  -LS      Transfer Training PT LTG, Time to Achieve 2 wks  -LS      Transfer Training PT LTG, Activity Type sit to stand/stand to sit  -LS      Transfer Training PT LTG, [place] Level supervision required  -LS      Transfer Training PT LTG, Assist Device walker, rolling  -LS      Gait Training PT LTG    Gait Training Goal PT LTG, Date Established 11/13/17  -LS      Gait Training Goal PT LTG, Time to Achieve 2 wks  -LS      Gait Training Goal PT LTG, [place] Level supervision required  -LS      Gait Training Goal PT LTG, Assist Device walker, rolling  -LS      Gait Training Goal PT LTG, Distance to Achieve 200  -LS      Stair Training PT LTG    Stair Training Goal PT LTG, Date Established 11/13/17  -LS      Stair Training Goal PT LTG, Time to Achieve 2 wks  -LS      Stair Training Goal PT LTG, Number of Steps 2  -LS      Stair Training Goal PT LTG, [place] Level contact guard assist  -LS        User Key  (r) = Recorded By, (t) = Taken By, (c) = Cosigned By    Initials Name Provider Type    VIELKA Masters, PTA Physical Therapy Assistant    WILMER Xiao, PT Physical Therapist          Physical Therapy Education     Title: PT OT SLP Therapies (Active)     Topic: Physical Therapy (Active)     Point: Mobility training (Active)    Learning Progress  Summary    Learner Readiness Method Response Comment Documented by Status   Patient Acceptance E,D DU,NR  UD 11/16/17 1407 Done    Acceptance E,D NR Discussed at length benefit of progressing mobility to promote PLOF. Needs encouragement.  11/15/17 0922 Active    Acceptance E VU,NR  AC 11/14/17 2348 Done    Acceptance E,D NR   11/14/17 1213 Active    Acceptance E VU,NR   11/14/17 0414 Done    Acceptance E,D NR   11/13/17 1005 Active   Family Acceptance E,D DU,NR  UD 11/16/17 1407 Done    Acceptance E VU,NR  AC 11/14/17 2348 Done    Acceptance E VU,NR  AC 11/14/17 0414 Done    Acceptance E,D NR   11/13/17 1005 Active   Significant Other Acceptance E,D NR   11/14/17 1213 Active               Point: Home exercise program (Active)    Learning Progress Summary    Learner Readiness Method Response Comment Documented by Status   Patient Acceptance E,D DU,NR  UD 11/16/17 1407 Done    Acceptance E,D NR Discussed at length benefit of progressing mobility to promote PLOF. Needs encouragement.  11/15/17 0922 Active    Acceptance E VU,NR  AC 11/14/17 2348 Done    Acceptance E,D NR   11/14/17 1213 Active    Acceptance E VU,NR   11/14/17 0414 Done   Family Acceptance E,D DU,NR   11/16/17 1407 Done    Acceptance E VU,NR   11/14/17 2348 Done    Acceptance E VU,NR   11/14/17 0414 Done   Significant Other Acceptance E,D NR   11/14/17 1213 Active               Point: Body mechanics (Active)    Learning Progress Summary    Learner Readiness Method Response Comment Documented by Status   Patient Acceptance E,D DU,NR  UD 11/16/17 1407 Done    Acceptance E,D NR Discussed at length benefit of progressing mobility to promote PLOF. Needs encouragement.  11/15/17 0922 Active    Acceptance E VU,NR  AC 11/14/17 2348 Done    Acceptance E,D NR   11/14/17 1213 Active    Acceptance E VU,NR   11/14/17 0414 Done    Acceptance E,D NR   11/13/17 1005 Active   Family Acceptance E,D DU,NR   11/16/17 1407 Done     Acceptance E VU,NR   11/14/17 2348 Done    Acceptance E VU,NR   11/14/17 0414 Done    Acceptance E,D NR   11/13/17 1005 Active   Significant Other Acceptance E,D NR   11/14/17 1213 Active               Point: Precautions (Active)    Learning Progress Summary    Learner Readiness Method Response Comment Documented by Status   Patient Acceptance E,D DU,NR   11/16/17 1407 Done    Acceptance E,D NR Discussed at length benefit of progressing mobility to promote PLOF. Needs encouragement.  11/15/17 0922 Active    Acceptance E VU,NR   11/14/17 2348 Done    Acceptance E,D NR   11/14/17 1213 Active    Acceptance E VU,NR   11/14/17 0414 Done    Acceptance E,D NR   11/13/17 1005 Active   Family Acceptance E,D DU,NR   11/16/17 1407 Done    Acceptance E VU,NR   11/14/17 2348 Done    Acceptance E VU,NR   11/14/17 0414 Done    Acceptance E,D NR   11/13/17 1005 Active   Significant Other Acceptance E,D NR   11/14/17 1213 Active                      User Key     Initials Effective Dates Name Provider Type Discipline     06/22/15 -  Bharti Masters, PTA Physical Therapy Assistant PT     06/19/15 -  Buffy Xiao, PT Physical Therapist PT     07/03/17 -  Claudia Dietrich, RN Registered Nurse Nurse                    PT Recommendation and Plan  Anticipated Discharge Disposition: skilled nursing facility  PT Frequency: daily  Plan of Care Review  Plan Of Care Reviewed With: patient, family  Progress: progress toward functional goals is gradual  Outcome Summary/Follow up Plan: pt up in chair,fatigue.ambulat 60 ft only,did exercises sitting in chair          Outcome Measures       11/16/17 1330 11/15/17 0815 11/14/17 1145    How much help from another person do you currently need...    Turning from your back to your side while in flat bed without using bedrails? 3  -UD 3  -LS 3  -LS    Moving from lying on back to sitting on the side of a flat bed without bedrails? 3  -UD 2  -LS 2  -LS    Moving to and from a  bed to a chair (including a wheelchair)? 3  -UD 3  -LS 3  -LS    Standing up from a chair using your arms (e.g., wheelchair, bedside chair)? 3  -UD 3  -LS 2  -LS    Climbing 3-5 steps with a railing? 2  -UD 1  -LS 1  -LS    To walk in hospital room? 3  -UD 3  -LS 3  -LS    AM-PAC 6 Clicks Score 17  -UD 15  -LS 14  -LS    Functional Assessment    Outcome Measure Options  AM-PAC 6 Clicks Basic Mobility (PT)  -LS AM-PAC 6 Clicks Basic Mobility (PT)  -LS      User Key  (r) = Recorded By, (t) = Taken By, (c) = Cosigned By    Initials Name Provider Type    UD Bharti Masters PTA Physical Therapy Assistant    WILMER Xiao, PT Physical Therapist           Time Calculation:         PT Charges       11/16/17 1409          Time Calculation    PT Received On 11/16/17  -      PT Goal Re-Cert Due Date 11/23/17  -      Time Calculation- PT    Total Timed Code Minutes- PT 24 minute(s)  -UD        User Key  (r) = Recorded By, (t) = Taken By, (c) = Cosigned By    Initials Name Provider Type    UD Bharti Masters PTA Physical Therapy Assistant          Therapy Charges for Today     Code Description Service Date Service Provider Modifiers Qty    42082497613 HC PT THER PROC EA 15 MIN 11/16/2017 Bharti Masters PTA GP 1    74277332033 HC GAIT TRAINING EA 15 MIN 11/16/2017 Bharti Masters PTA GP 1    65978392096 HC PT THER SUPP EA 15 MIN 11/16/2017 Bharti Masters PTA GP 1          PT G-Codes  Outcome Measure Options: AM-PAC 6 Clicks Basic Mobility (PT)    Bharti Masters PTA  11/16/2017            Electronically signed by Bharti Masters PTA at 11/16/2017  2:10 PM           Occupational Therapy Notes (most recent note)      Mirna Bernardo, OT at 11/15/2017  9:12 AM  Version 1 of 1         ICU Rounds: Continue PT per treatment plan.     Electronically signed by Mirna Bernardo OT at 11/15/2017  9:12 AM

## 2017-11-17 NOTE — PROGRESS NOTES
"   LOS: 9 days    Patient Care Team:  No Known Provider as PCP - General    Reason For Visit:  F/U ELLE  Subjective   No new events comfortable    Review of Systems:    Denies any nausea vomiting no dysuria or gross hematuria, still have some shortness of breath without any chest pain.      Objective       ceftriaxone 1 g Intravenous Q24H   furosemide 40 mg Intravenous Daily   pantoprazole 40 mg Oral BID AC            Vital Signs:  Blood pressure 156/72, pulse 97, temperature 98 °F (36.7 °C), temperature source Oral, resp. rate 22, height 63\" (160 cm), weight 156 lb 3.2 oz (70.9 kg), SpO2 100 %.    Flowsheet Rows         First Filed Value    Admission Height  63\" (160 cm) Documented at 11/08/2017 2018    Admission Weight  166 lb 0.1 oz (75.3 kg) Documented at 11/08/2017 2018          11/16 0701 - 11/17 0700  In: 1970 [P.O.:1920]  Out: -     Physical Exam:    General Appearance: Alert oriented ×3 no obvious distress sitting in chair.  Eyes: PER, EOMI.  sclerae normal, no icterus  Lungs: Rhonchi's and Rales are heard at the bases.  Heart/CV: regular rhythm & normal rate, no murmur, no gallop, no rub and +1+ edema, 2+ on left edema  Abdomen: not distended, soft, non-tender, no masses,  bowel sounds present  Skin: No rash, Warm and dry    Radiology:    Labs:    Results from last 7 days  Lab Units 11/17/17  1524 11/17/17  0604 11/15/17  0415 11/13/17  0732   WBC 10*3/mm3  --  14.57* 10.90* 10.65   HEMOGLOBIN g/dL 8.0* 5.7* 9.0* 8.6*   HEMATOCRIT % 24.8* 17.9* 27.6* 26.7*   PLATELETS 10*3/mm3  --  286 217 178       Results from last 7 days  Lab Units 11/17/17  0401 11/16/17  0427 11/15/17  0415 11/14/17  1912 11/14/17  0427  11/12/17  0438 11/11/17  0355   SODIUM mmol/L 141 140 143  --  140  < > 141 136   POTASSIUM mmol/L 3.8 3.4* 3.5 3.5 2.9*  < > 3.4* 3.9   CHLORIDE mmol/L 107 106 108  --  106  < > 105 102   CO2 mmol/L 25.0 25.0 24.0  --  25.0  < > 23.0 21.0   BUN mg/dL 33* 39* 49*  --  62*  < > 69* 61*   CREATININE " mg/dL 2.20* 2.60* 3.10*  --  3.80*  < > 4.40* 4.70*   CALCIUM mg/dL 8.7 9.0 8.7  --  8.9  < > 7.8* 7.7*   PHOSPHORUS mg/dL 3.9 3.9 4.2  --  5.2*  < > 5.8* 6.2*   MAGNESIUM mg/dL 1.7  --  2.0  --   --   --  2.7 2.7   ALBUMIN g/dL 3.20 3.20 3.30  --  3.30  < > 3.40 2.70*   < > = values in this interval not displayed.    Results from last 7 days  Lab Units 11/17/17  0401   GLUCOSE mg/dL 114*             Results from last 7 days  Lab Units 11/12/17  1347   PH, ARTERIAL pH units 7.331*   PO2 ART mm Hg 77.3*   PCO2, ARTERIAL mm Hg 39.3   HCO3 ART mmol/L 20.8         Results from last 7 days  Lab Units 11/17/17  0946   COLOR UA  Yellow   CLARITY UA  Clear   PH, URINE  6.5   SPECIFIC GRAVITY, URINE  <=1.005   GLUCOSE UA  Negative   KETONES UA  Negative   BILIRUBIN UA  Negative   PROTEIN UA  Trace*   BLOOD UA  Moderate (2+)*   LEUKOCYTES UA  Small (1+)*   NITRITE UA  Negative            Assessment     Principal Problem:    Sepsis, probable source is urine  Active Problems:    Acute renal failure (ARF)    UTI (urinary tract infection)    Gastroenteritis    Impression: ELLE SECONDARY TO NSAIA USE AND SEPSIS. Renal function continues to improve slowly at this time.  Electrolytes will be replaced.  Anemia patient is getting blood transfusion..    TO REHAB SOON. WE CAN F/U IN OUR [place] [facility] AFTER DISCHARGE.       Zara Wilde MD  11/17/17  4:00 PM

## 2017-11-17 NOTE — SIGNIFICANT NOTE
RN called to report critical hgb in 5 range. This is a notable drop from coming in at 12.6 on 11/8, and 8.2-10.2 in varied order since. RN examined pt in depth again, assessment unchanged, no known bleeding observed.    Out of ICU day before yesterday. Had acute renal failure that improved with fluids, acidosis improved with bicarb as well, on pressors with hypotension. Had diarrhea, c diff neg, that improved.Then FVE and improved with bumex and bipap. Did not require dialysis, did get IV infusions. E coli UTI - finished 3 days of zosyn, one vanc, and still on rocephin with 6 doses completed thus far. Note blood in urine on 11/8 UA.    Repeat CBC stat and manual. The BMP doesn't appear overly skewed, Cr slightly improved again at 2.2. Additional diagnostics, as well as occult and check UA to look for blood still. Doesn't appear in new/worse overload but can check BNP to see if further increased but that may likely tell much with renal impairment as well. If the hgb is real, will likely plan for a stat CT abdomen r/t continued abd pain in note, and KUB deficits. Vitals appear same as they were still slightly tachycardic, tachypnea, on same O2 requirement.    Please f/u.

## 2017-11-17 NOTE — PLAN OF CARE
Problem: Patient Care Overview (Adult)  Goal: Plan of Care Review  Outcome: Ongoing (interventions implemented as appropriate)    11/17/17 1524   Coping/Psychosocial Response Interventions   Plan Of Care Reviewed With patient;spouse   Patient Care Overview   Progress no change   Outcome Evaluation   Outcome Summary/Follow up Plan pt cooperative and alert. pt had critica H&H this AM. dramatic drop. 2 units PRBC ordered to infuse slowly and monitor status. H&H q8H.          Problem: Renal Failure/Kidney Injury, Acute (Adult)  Goal: Signs and Symptoms of Listed Potential Problems Will be Absent or Manageable (Renal Failure/Kidney Injury, Acute)  Outcome: Ongoing (interventions implemented as appropriate)    Problem: Sepsis (Adult)  Goal: Signs and Symptoms of Listed Potential Problems Will be Absent or Manageable (Sepsis)  Outcome: Ongoing (interventions implemented as appropriate)    Problem: Pressure Ulcer Risk (Tal Scale) (Adult,Obstetrics,Pediatric)  Goal: Identify Related Risk Factors and Signs and Symptoms  Outcome: Ongoing (interventions implemented as appropriate)  Goal: Skin Integrity  Outcome: Ongoing (interventions implemented as appropriate)    Problem: Fluid Volume Excess (Adult,Obstetrics,Pediatric)  Goal: Identify Related Risk Factors and Signs and Symptoms  Outcome: Ongoing (interventions implemented as appropriate)  Goal: Stable Weight  Outcome: Ongoing (interventions implemented as appropriate)  Goal: Balanced Intake/Output  Outcome: Ongoing (interventions implemented as appropriate)    Problem: Fall Risk (Adult)  Goal: Identify Related Risk Factors and Signs and Symptoms  Outcome: Ongoing (interventions implemented as appropriate)  Goal: Absence of Falls  Outcome: Ongoing (interventions implemented as appropriate)

## 2017-11-17 NOTE — PROGRESS NOTES
[facility] Medicine Services  PROGRESS NOTE    Patient Name: Jaclyn Garcia  : 1934  MRN: [id number]    Date of Admission: 2017  Length of Stay: 9  Primary Care Physician: No Known Provider    Subjective   Subjective     CC: abdominal pain and diarrhea    Subjective:  Resting in a chair in no acute distress and looks calm and comfortable.  Complains of lower quadrant pain and tenderness.  Also complains of mild shortness of breath.  No fever or chills.  No nausea, vomiting, diarrhea.  No headache or visual changes.  No difficulty swallowing.  No focal weakness or numbness.  No rashes or hives    Review of Systems      Otherwise ROS is negative except as mentioned above.    Objective   Objective     Vital Signs:   Temp:  [97.6 °F (36.4 °C)-98 °F (36.7 °C)] 98 °F (36.7 °C)  Heart Rate:  [] 102  Resp:  [18-24] 20  BP: (113-145)/(62-80) 145/77        Physical Exam:  Constitutional: No acute distress, awake, alert  Eyes: PERRLA, sclerae anicteric, no conjunctival injection  HENT: NCAT, mucous membranes moist  Neck: Supple, no thyromegaly, no lymphadenopathy, trachea midline  Respiratory: Clear to auscultation bilaterally, nonlabored respirations.  Patient is on 2 L of nasal cannula and saturating well   Cardiovascular: RRR, no murmurs, rubs, or gallops, palpable pedal pulses bilaterally  Gastrointestinal: Very obese, Positive bowel sounds, soft, mild lower quadrant tenderness, nondistended  Musculoskeletal: No bilateral ankle edema, no clubbing or cyanosis to extremities  Psychiatric: Appropriate affect, cooperative  Neurologic: Oriented x 3, strength symmetric in all extremities, Cranial Nerves grossly intact to confrontation, speech clear  Skin: No rashes  Results Reviewed:  I have personally reviewed current lab, radiology, and data and agree.      Results from last 7 days  Lab Units 17  0604 11/15/17  0415 17  0732 17  0438   WBC 10*3/mm3 14.57* 10.90*  10.65 10.61   HEMOGLOBIN g/dL 5.7* 9.0* 8.6* 8.2*   HEMATOCRIT % 17.9* 27.6* 26.7* 24.8*   PLATELETS 10*3/mm3 286 217 178 170   INR   --   --   --  1.32       Results from last 7 days  Lab Units 11/17/17  0401 11/16/17  0427 11/15/17  0415  11/12/17  1326   SODIUM mmol/L 141 140 143  < >  --    POTASSIUM mmol/L 3.8 3.4* 3.5  < >  --    CHLORIDE mmol/L 107 106 108  < >  --    CO2 mmol/L 25.0 25.0 24.0  < >  --    BUN mg/dL 33* 39* 49*  < >  --    CREATININE mg/dL 2.20* 2.60* 3.10*  < >  --    GLUCOSE mg/dL 114* 128* 106*  < >  --    CALCIUM mg/dL 8.7 9.0 8.7  < >  --    TROPONIN I ng/mL  --   --   --   --  0.055*   < > = values in this interval not displayed.  BNP   Date Value Ref Range Status   11/17/2017 90.0 0.0 - 100.0 pg/mL Final     No results found for: PHART    Microbiology Results Abnormal     Procedure Component Value - Date/Time    Blood Culture - Blood, [[id number]]  (Normal) Collected:  11/08/17 2049    Lab Status:  Final result Specimen:  Blood from Hand, Left Updated:  11/13/17 2216     Blood Culture No growth at 5 days    Blood Culture - Blood, [[id number]]  (Normal) Collected:  11/08/17 2055    Lab Status:  Final result Specimen:  Blood from Hand, Right Updated:  11/13/17 2216     Blood Culture No growth at 5 days    Eosinophil Smear - Urine, Urine, Clean Catch [[id number]]  (Normal) Collected:  11/11/17 1030    Lab Status:  Final result Specimen:  Urine from Urine, Clean Catch Updated:  11/11/17 1547     Eosinophil Smear 0 % EOS/100 Cells     Narrative:       No eosinophil seen    Urine Culture - Urine, Urine, Catheter [[id number]]  (Abnormal)  (Susceptibility) Collected:  11/08/17 2131    Lab Status:  Final result Specimen:  Urine from Urine, Catheter Updated:  11/11/17 1157     Urine Culture --      10,000-20,000 CFU/mL Escherichia coli (A)    Susceptibility      Escherichia coli     CHERYL     Ampicillin <=8 ug/ml Susceptible     Ampicillin + Sulbactam <=8/4 ug/ml Susceptible     Aztreonam <=8 ug/ml  Susceptible     Cefepime <=8 ug/ml Susceptible     Cefotaxime <=2 ug/ml Susceptible     Ceftriaxone <=8 ug/ml Susceptible     Cefuroxime sodium <=4 ug/ml Susceptible     Cephalothin <=8 ug/ml Susceptible     Ertapenem <=1 ug/ml Susceptible     Gentamicin <=4 ug/ml Susceptible     Levofloxacin <=2 ug/ml Susceptible     Meropenem <=1 ug/ml Susceptible     Nitrofurantoin <=32 ug/ml Susceptible     Piperacillin + Tazobactam <=16 ug/ml Susceptible     Tetracycline <=4 ug/ml Susceptible     Tobramycin <=4 ug/ml Susceptible     Trimethoprim + Sulfamethoxazole <=2/38 ug/ml Susceptible                    Clostridium Difficile Toxin - Stool, Per Rectum [[id number]] Collected:  11/09/17 0701    Lab Status:  Final result Specimen:  Stool from Per Rectum Updated:  11/09/17 0922    Narrative:       The following orders were created for panel order Clostridium Difficile Toxin - Stool, Per Rectum.  Procedure                               Abnormality         Status                     ---------                               -----------         ------                     Clostridium Difficile To...[[id number]]  Normal              Final result                 Please view results for these tests on the individual orders.    Clostridium Difficile Toxin, PCR - Stool, Per Rectum [[id number]]  (Normal) Collected:  11/09/17 0701    Lab Status:  Final result Specimen:  Stool from Per Rectum Updated:  11/09/17 0922     C. Difficile Toxins by PCR Not Detected    Narrative:         Performance characteristics of test not established for patients <2 years of age.  Negative for Toxigenic C. Difficile          Imaging Results (last 24 hours)     Procedure Component Value Units Date/Time    XR Abdomen KUB [[id number]] Collected:  11/16/17 1754     Updated:  11/16/17 1839    Narrative:          EXAMINATION: XR ABDOMEN KUB - 11/16/2017     INDICATION:  A41.51-Sepsis due to Escherichia coli (e. coli);  N17.9-Acute kidney failure, unspecified;  Z74.09-Other reduced mobility.      COMPARISON: None.     FINDINGS:   1. Diffuse gaseous distention of the bowel is noted involving both large  and small bowel diffusely.     2. Transition zone or mass is not identified.     3. Abnormal calcifications are not identified. The pelvis is not  included on the image.     4. There is airspace opacity left lung base with pleural effusion in the  left lower chest.       Impression:       Mild distention of the bowel diffusely involving large and  small bowel globally with no evidence of mass, transition zone or  high-grade obstruction. Pneumatosis is not identified and no other acute  conventional image findings are identified.     DICTATED:     11/16/2017  EDITED:          11/16/2017        This report was finalized on 11/16/2017 6:37 PM by Dr. Cheikh Marks MD.           Results for orders placed during the hospital encounter of 11/08/17   Adult Transthoracic Echo Complete W/ Cont if Necessary Per Protocol    Narrative · Left atrial cavity size is borderline dilated.  · Mild to moderate tricuspid valve regurgitation is present.  · Calculated right ventricular systolic pressure from tricuspid   regurgitation is 40 mmHg.  · Mild aortic valve regurgitation is present.  · Mild mitral valve regurgitation is present.  · Left ventricular systolic function is normal. Estimated EF = 65%.  · There is no evidence of pericardial effusion.  · Mild pulmonary hypertension is present.  · The aortic valve exhibits sclerosis.  · Normal right ventricular cavity size, wall thickness, systolic function   and septal motion noted.  · Left ventricular diastolic function is normal.          I have reviewed the medications.    Assessment/Plan   Assessment / Plan     Hospital Problem List     * (Principal)Sepsis, probable source is urine    Acute renal failure (ARF)    UTI (urinary tract infection)    Gastroenteritis             Brief Hospital Course to date:  Jaclyn Garcia is a 83 y.o. female  with past medical history significant for hypertension, hyperlipidemia, GERD.  Patient was brought to the emergency room after few days of diarrhea.  Patient was initially admitted to ICU for sepsis and UTI and also for acute kidney injury.  On admission creatinine was above 4 but it has come down since then.  Patient was transferred from ICU to the floor on 11/15/2017.      Assessment & Plan:    * sudden prop in H/H. spource of bleeding unknown at this point. Pt is clinically stable.    Abdominal pain. KUB shows sarah filed multiple loops of intestine. No obstruction, no pneumaotosis.    * Urinary tract infection.    * Sepsis secondary to above improved    * Acute kidney injury with creatinine above 4 improving.    *Recent history of diarrhea and abdominal pain.  Abdominal pain has not completely resolved.    * edema of LEBL.    * SOB probably mild pulmonary edema.    * Severe generalized weakness    PLAN:  - cont antibiotics  - lasix  - transfuse 2 units PRBC  - labs in am  - monitor H/H closely.  -occult blood    DVT Prophylaxis:      CODE STATUS: Conditional Code    Disposition: TBD.    Dangelo Lopez MD  11/17/17  2:50 PM

## 2017-11-17 NOTE — PLAN OF CARE
"Problem: Patient Care Overview (Adult)  Goal: Plan of Care Review  Outcome: Ongoing (interventions implemented as appropriate)    11/16/17 1407 11/16/17 2035 11/17/17 0210   Coping/Psychosocial Response Interventions   Plan Of Care Reviewed With --  family;patient --    Patient Care Overview   Progress progress toward functional goals is gradual --  --    Outcome Evaluation   Outcome Summary/Follow up Plan --  --  Patient at rest. Requires assist x 1 with ADLS and tranfers with walker and gait belt. SOA with exertion. Lung sounds varies. Continues Neb tx and encourage to use incentive spirometer and PEP. Uses call light for assistance. Offered various foods and drinks. Declines. States\"I'm just not hungry\". Family at bedside.          Problem: Renal Failure/Kidney Injury, Acute (Adult)  Goal: Signs and Symptoms of Listed Potential Problems Will be Absent or Manageable (Renal Failure/Kidney Injury, Acute)  Outcome: Ongoing (interventions implemented as appropriate)    Problem: Sepsis (Adult)  Goal: Signs and Symptoms of Listed Potential Problems Will be Absent or Manageable (Sepsis)  Outcome: Ongoing (interventions implemented as appropriate)    Problem: Pressure Ulcer Risk (Tal Scale) (Adult,Obstetrics,Pediatric)  Goal: Identify Related Risk Factors and Signs and Symptoms  Outcome: Ongoing (interventions implemented as appropriate)    Problem: Fluid Volume Excess (Adult,Obstetrics,Pediatric)  Goal: Identify Related Risk Factors and Signs and Symptoms  Outcome: Ongoing (interventions implemented as appropriate)    Problem: Fall Risk (Adult)  Goal: Identify Related Risk Factors and Signs and Symptoms  Outcome: Ongoing (interventions implemented as appropriate)      "

## 2017-11-17 NOTE — PROGRESS NOTES
Continued Stay Note   [place]     Patient Name: Jaclyn Garcia  MRN: [id number]  Today's Date: 11/17/2017    Admit Date: 11/8/2017          Discharge Plan       11/17/17 1150    Case Management/Social Work Plan    Plan skilled nursing facility    Patient/Family In Agreement With Plan yes    Additional Comments Per Jud with [facility], Mrs. Garcia is on the list for a subacute bed. However, the waiting list is extensive. I discussed this with Mrs. Garcia and her family at the bedside. They are requesting that referrals be made to facilities in [place]. Referrals have been made. This will require insurance precert.              Discharge Codes     None        Expected Discharge Date and Time     Expected Discharge Date Expected Discharge Time    Nov 20, 2017             Homer Moss

## 2017-11-18 LAB
ABO + RH BLD: NORMAL
ABO + RH BLD: NORMAL
ALBUMIN SERPL-MCNC: 3.2 G/DL (ref 3.2–4.8)
ALBUMIN/GLOB SERPL: 1.1 G/DL (ref 1.5–2.5)
ALP SERPL-CCNC: 57 U/L (ref 25–100)
ALT SERPL W P-5'-P-CCNC: 15 U/L (ref 7–40)
ANION GAP SERPL CALCULATED.3IONS-SCNC: 11 MMOL/L (ref 3–11)
AST SERPL-CCNC: 25 U/L (ref 0–33)
BASOPHILS # BLD AUTO: 0.12 10*3/MM3 (ref 0–0.2)
BASOPHILS NFR BLD AUTO: 0.8 % (ref 0–1)
BH BB BLOOD EXPIRATION DATE: NORMAL
BH BB BLOOD EXPIRATION DATE: NORMAL
BH BB BLOOD TYPE BARCODE: 600
BH BB BLOOD TYPE BARCODE: 600
BH BB DISPENSE STATUS: NORMAL
BH BB DISPENSE STATUS: NORMAL
BH BB PRODUCT CODE: NORMAL
BH BB PRODUCT CODE: NORMAL
BH BB UNIT NUMBER: NORMAL
BH BB UNIT NUMBER: NORMAL
BILIRUB SERPL-MCNC: 1.2 MG/DL (ref 0.3–1.2)
BUN BLD-MCNC: 26 MG/DL (ref 9–23)
BUN/CREAT SERPL: 13 (ref 7–25)
CALCIUM SPEC-SCNC: 8.9 MG/DL (ref 8.7–10.4)
CHLORIDE SERPL-SCNC: 107 MMOL/L (ref 99–109)
CO2 SERPL-SCNC: 24 MMOL/L (ref 20–31)
CREAT BLD-MCNC: 2 MG/DL (ref 0.6–1.3)
DEPRECATED RDW RBC AUTO: 66.7 FL (ref 37–54)
EOSINOPHIL # BLD AUTO: 0.48 10*3/MM3 (ref 0–0.3)
EOSINOPHIL NFR BLD AUTO: 3.4 % (ref 0–3)
ERYTHROCYTE [DISTWIDTH] IN BLOOD BY AUTOMATED COUNT: 20.8 % (ref 11.3–14.5)
GFR SERPL CREATININE-BSD FRML MDRD: 24 ML/MIN/1.73
GLOBULIN UR ELPH-MCNC: 3 GM/DL
GLUCOSE BLD-MCNC: 96 MG/DL (ref 70–100)
HCT VFR BLD AUTO: 25.8 % (ref 34.5–44)
HCT VFR BLD AUTO: 26.4 % (ref 34.5–44)
HCT VFR BLD AUTO: 26.9 % (ref 34.5–44)
HCT VFR BLD AUTO: 27.5 % (ref 34.5–44)
HGB BLD-MCNC: 8.7 G/DL (ref 11.5–15.5)
HGB BLD-MCNC: 8.8 G/DL (ref 11.5–15.5)
HGB BLD-MCNC: 8.8 G/DL (ref 11.5–15.5)
HGB BLD-MCNC: 9 G/DL (ref 11.5–15.5)
IMM GRANULOCYTES # BLD: 0.17 10*3/MM3 (ref 0–0.03)
IMM GRANULOCYTES NFR BLD: 1.2 % (ref 0–0.6)
LYMPHOCYTES # BLD AUTO: 1.89 10*3/MM3 (ref 0.6–4.8)
LYMPHOCYTES NFR BLD AUTO: 13.2 % (ref 24–44)
MCH RBC QN AUTO: 30.4 PG (ref 27–31)
MCHC RBC AUTO-ENTMCNC: 33.3 G/DL (ref 32–36)
MCV RBC AUTO: 91.3 FL (ref 80–99)
MONOCYTES # BLD AUTO: 1.12 10*3/MM3 (ref 0–1)
MONOCYTES NFR BLD AUTO: 7.8 % (ref 0–12)
NEUTROPHILS # BLD AUTO: 10.54 10*3/MM3 (ref 1.5–8.3)
NEUTROPHILS NFR BLD AUTO: 73.6 % (ref 41–71)
PHOSPHATE SERPL-MCNC: 3.8 MG/DL (ref 2.4–5.1)
PLATELET # BLD AUTO: 328 10*3/MM3 (ref 150–450)
PMV BLD AUTO: 9.9 FL (ref 6–12)
POTASSIUM BLD-SCNC: 3.5 MMOL/L (ref 3.5–5.5)
PROCALCITONIN SERPL-MCNC: 0.37 NG/ML
PROT SERPL-MCNC: 6.2 G/DL (ref 5.7–8.2)
RBC # BLD AUTO: 2.89 10*6/MM3 (ref 3.89–5.14)
SODIUM BLD-SCNC: 142 MMOL/L (ref 132–146)
UNIT  ABO: NORMAL
UNIT  ABO: NORMAL
UNIT  RH: NORMAL
UNIT  RH: NORMAL
WBC NRBC COR # BLD: 14.32 10*3/MM3 (ref 3.5–10.8)

## 2017-11-18 PROCEDURE — 25010000002 CEFTRIAXONE PER 250 MG: Performed by: INTERNAL MEDICINE

## 2017-11-18 PROCEDURE — 25010000002 FUROSEMIDE PER 20 MG: Performed by: INTERNAL MEDICINE

## 2017-11-18 PROCEDURE — 84100 ASSAY OF PHOSPHORUS: CPT | Performed by: INTERNAL MEDICINE

## 2017-11-18 PROCEDURE — 84145 PROCALCITONIN (PCT): CPT | Performed by: INTERNAL MEDICINE

## 2017-11-18 PROCEDURE — 85025 COMPLETE CBC W/AUTO DIFF WBC: CPT | Performed by: INTERNAL MEDICINE

## 2017-11-18 PROCEDURE — 85014 HEMATOCRIT: CPT | Performed by: INTERNAL MEDICINE

## 2017-11-18 PROCEDURE — 94799 UNLISTED PULMONARY SVC/PX: CPT

## 2017-11-18 PROCEDURE — 80053 COMPREHEN METABOLIC PANEL: CPT | Performed by: INTERNAL MEDICINE

## 2017-11-18 PROCEDURE — 85018 HEMOGLOBIN: CPT | Performed by: INTERNAL MEDICINE

## 2017-11-18 PROCEDURE — 99232 SBSQ HOSP IP/OBS MODERATE 35: CPT | Performed by: INTERNAL MEDICINE

## 2017-11-18 RX ORDER — BUMETANIDE 0.25 MG/ML
2 INJECTION INTRAMUSCULAR; INTRAVENOUS 2 TIMES DAILY
Status: DISCONTINUED | OUTPATIENT
Start: 2017-11-18 | End: 2017-11-19 | Stop reason: SDUPTHER

## 2017-11-18 RX ORDER — ATORVASTATIN CALCIUM 40 MG/1
40 TABLET, FILM COATED ORAL NIGHTLY
Status: DISCONTINUED | OUTPATIENT
Start: 2017-11-18 | End: 2017-11-21 | Stop reason: HOSPADM

## 2017-11-18 RX ADMIN — PANTOPRAZOLE SODIUM 40 MG: 40 TABLET, DELAYED RELEASE ORAL at 18:57

## 2017-11-18 RX ADMIN — Medication 5 MG: at 21:13

## 2017-11-18 RX ADMIN — ALPRAZOLAM 0.25 MG: 0.25 TABLET ORAL at 10:28

## 2017-11-18 RX ADMIN — CEFTRIAXONE SODIUM 1 G: 1 INJECTION, SOLUTION INTRAVENOUS at 14:26

## 2017-11-18 RX ADMIN — ALPRAZOLAM 0.25 MG: 0.25 TABLET ORAL at 01:59

## 2017-11-18 RX ADMIN — ATORVASTATIN CALCIUM 40 MG: 40 TABLET, FILM COATED ORAL at 21:13

## 2017-11-18 RX ADMIN — PANTOPRAZOLE SODIUM 40 MG: 40 TABLET, DELAYED RELEASE ORAL at 09:26

## 2017-11-18 RX ADMIN — GUAIFENESIN 200 MG: 100 SOLUTION ORAL at 21:13

## 2017-11-18 RX ADMIN — ALPRAZOLAM 0.25 MG: 0.25 TABLET ORAL at 21:13

## 2017-11-18 RX ADMIN — FUROSEMIDE 40 MG: 10 INJECTION, SOLUTION INTRAMUSCULAR; INTRAVENOUS at 09:26

## 2017-11-18 RX ADMIN — BISACODYL 10 MG: 10 SUPPOSITORY RECTAL at 09:27

## 2017-11-18 RX ADMIN — BUMETANIDE 2 MG: 0.25 INJECTION INTRAMUSCULAR; INTRAVENOUS at 18:56

## 2017-11-18 NOTE — PROGRESS NOTES
"   LOS: 10 days    Patient Care Team:  No Known Provider as PCP - General    Reason For Visit:  F/U ELLE  Subjective   No new events comfortable eating in chair.    Review of Systems:    Denies any nausea vomiting no dysuria or gross hematuria, still have some shortness of breath without any chest pain.      Objective       atorvastatin 40 mg Oral Nightly   ceftriaxone 1 g Intravenous Q24H   furosemide 40 mg Intravenous Daily   pantoprazole 40 mg Oral BID AC            Vital Signs:  Blood pressure 124/70, pulse 97, temperature 98.6 °F (37 °C), resp. rate 16, height 63\" (160 cm), weight 156 lb 3.2 oz (70.9 kg), SpO2 96 %.    Flowsheet Rows         First Filed Value    Admission Height  63\" (160 cm) Documented at 11/08/2017 2018    Admission Weight  166 lb 0.1 oz (75.3 kg) Documented at 11/08/2017 2018 11/17 0701 - 11/18 0700  In: 1360 [P.O.:660]  Out: 1350 [Urine:1350]    Physical Exam:    General Appearance: No obvious distress.  Alert oriented  Eyes: PER, EOMI.  sclerae normal, no icterus  Lungs: Rhonchi's and Rales are heard at the bases.  Heart/CV: regular rhythm & normal rate, no murmur, no gallop, no rub and +1+ edema, 2+ on left edema  Abdomen: not distended, soft, non-tender, no masses,  bowel sounds present  Skin: No rash, Warm and dry  Extremities: Positive edema.  No cyanosis.  Neuro: Grossly intact alert oriented ×3.  No focal deficit.    Radiology:    Labs:    Results from last 7 days  Lab Units 11/18/17  0754 11/17/17  2358 11/17/17  1524 11/17/17  0604 11/15/17  0415   WBC 10*3/mm3 14.32*  --   --  14.57* 10.90*   HEMOGLOBIN g/dL 8.8*  9.0* 8.8* 8.0* 5.7* 9.0*   HEMATOCRIT % 26.4*  27.5* 26.9* 24.8* 17.9* 27.6*   PLATELETS 10*3/mm3 328  --   --  286 217       Results from last 7 days  Lab Units 11/18/17  0417 11/17/17  0401 11/16/17  0427 11/15/17  0415  11/12/17  0438   SODIUM mmol/L 142 141 140 143  < > 141   POTASSIUM mmol/L 3.5 3.8 3.4* 3.5  < > 3.4*   CHLORIDE mmol/L 107 107 106 108  < > " 105   CO2 mmol/L 24.0 25.0 25.0 24.0  < > 23.0   BUN mg/dL 26* 33* 39* 49*  < > 69*   CREATININE mg/dL 2.00* 2.20* 2.60* 3.10*  < > 4.40*   CALCIUM mg/dL 8.9 8.7 9.0 8.7  < > 7.8*   PHOSPHORUS mg/dL 3.8 3.9 3.9 4.2  < > 5.8*   MAGNESIUM mg/dL  --  1.7  --  2.0  --  2.7   ALBUMIN g/dL 3.20 3.20 3.20 3.30  < > 3.40   < > = values in this interval not displayed.    Results from last 7 days  Lab Units 11/18/17  0417   GLUCOSE mg/dL 96         Results from last 7 days  Lab Units 11/18/17  0417   ALK PHOS U/L 57   BILIRUBIN mg/dL 1.2   ALT (SGPT) U/L 15   AST (SGOT) U/L 25       Results from last 7 days  Lab Units 11/12/17  1347   PH, ARTERIAL pH units 7.331*   PO2 ART mm Hg 77.3*   PCO2, ARTERIAL mm Hg 39.3   HCO3 ART mmol/L 20.8         Results from last 7 days  Lab Units 11/17/17  0946   COLOR UA  Yellow   CLARITY UA  Clear   PH, URINE  6.5   SPECIFIC GRAVITY, URINE  <=1.005   GLUCOSE UA  Negative   KETONES UA  Negative   BILIRUBIN UA  Negative   PROTEIN UA  Trace*   BLOOD UA  Moderate (2+)*   LEUKOCYTES UA  Small (1+)*   NITRITE UA  Negative            Assessment     Principal Problem:    Sepsis, probable source is urine  Active Problems:    Acute renal failure (ARF)    UTI (urinary tract infection)    Gastroenteritis    Impression: ELLE SECONDARY TO NSAIA USE AND SEPSIS. Renal function continues to improve slowly at this time.  Electrolytes will be replaced.  Anemia Stable  Edema.&    Will add diuretics today watch electrolytes, okay to be discharged from renal point a few any time.   TO REHAB SOON. WE CAN F/U IN OUR [place] [facility] AFTER DISCHARGE.       Zara Wilde MD  11/18/17  11:32 AM

## 2017-11-18 NOTE — PLAN OF CARE
Problem: Fluid Volume Excess (Adult,Obstetrics,Pediatric)  Goal: Identify Related Risk Factors and Signs and Symptoms  Outcome: Ongoing (interventions implemented as appropriate)    11/18/17 1715   Fluid Volume Excess   Fluid Volume Excess: Related Risk Factors disease process;knowledge deficit   Signs and Symptoms (Fluid Volume Excess) activity intolerance;anxiety;edema;lab value changes;restlessness       Goal: Stable Weight  Outcome: Ongoing (interventions implemented as appropriate)    11/18/17 1715   Fluid Volume Excess (Adult,Obstetrics,Pediatric)   Stable Weight making progress toward outcome       Goal: Balanced Intake/Output  Outcome: Ongoing (interventions implemented as appropriate)    11/18/17 1715   Fluid Volume Excess (Adult,Obstetrics,Pediatric)   Balanced Intake/Output making progress toward outcome

## 2017-11-18 NOTE — PROGRESS NOTES
[facility] Medicine Services  PROGRESS NOTE    Patient Name: Jaclyn Garcia  : 1934  MRN: [id number]    Date of Admission: 2017  Length of Stay: 10  Primary Care Physician: No Known Provider    Subjective   Subjective     CC: abdominal pain and diarrhea    Subjective:  Resting in a chair in no acute distress and looks calm and comfortable. Abdominal pain has improved. SOB has improved also but still not at the baseline. No fever or chills.  No nausea, vomiting, diarrhea.  No headache or visual changes.  No difficulty swallowing.  No focal weakness or numbness.  No rashes or hives    Review of Systems      Otherwise ROS is negative except as mentioned above.    Objective   Objective     Vital Signs:   Temp:  [98.1 °F (36.7 °C)-98.9 °F (37.2 °C)] 98.6 °F (37 °C)  Heart Rate:  [] 87  Resp:  [16-18] 16  BP: (117-161)/(69-93) 124/70        Physical Exam:  Constitutional: No acute distress, awake, alert  Eyes: PERRLA, sclerae anicteric, no conjunctival injection  HENT: NCAT, mucous membranes moist  Neck: Supple, no thyromegaly, no lymphadenopathy, trachea midline  Respiratory: Clear to auscultation bilaterally, nonlabored respirations.  Patient is on 2 L of nasal cannula and saturating well   Cardiovascular: RRR, no murmurs, rubs, or gallops, palpable pedal pulses bilaterally  Gastrointestinal: Very obese, Positive bowel sounds, soft, mild lower quadrant tenderness, nondistended  Musculoskeletal: No bilateral ankle edema, no clubbing or cyanosis to extremities  Psychiatric: Appropriate affect, cooperative  Neurologic: Oriented x 3, strength symmetric in all extremities, Cranial Nerves grossly intact to confrontation, speech clear  Skin: No rashes  Results Reviewed:  I have personally reviewed current lab, radiology, and data and agree.      Results from last 7 days  Lab Units 17  0754 17  2358 17  1524 17  1443 17  0604 11/15/17  0415   11/12/17  0438   WBC 10*3/mm3 14.32*  --   --   --  14.57* 10.90*  < > 10.61   HEMOGLOBIN g/dL 8.8*  9.0* 8.8* 8.0*  --  5.7* 9.0*  < > 8.2*   HEMATOCRIT % 26.4*  27.5* 26.9* 24.8*  --  17.9* 27.6*  < > 24.8*   PLATELETS 10*3/mm3 328  --   --   --  286 217  < > 170   INR   --   --   --  1.21  --   --   --  1.32   < > = values in this interval not displayed.    Results from last 7 days  Lab Units 11/18/17  0417 11/17/17  0401 11/16/17  0427  11/12/17  1326   SODIUM mmol/L 142 141 140  < >  --    POTASSIUM mmol/L 3.5 3.8 3.4*  < >  --    CHLORIDE mmol/L 107 107 106  < >  --    CO2 mmol/L 24.0 25.0 25.0  < >  --    BUN mg/dL 26* 33* 39*  < >  --    CREATININE mg/dL 2.00* 2.20* 2.60*  < >  --    GLUCOSE mg/dL 96 114* 128*  < >  --    CALCIUM mg/dL 8.9 8.7 9.0  < >  --    ALT (SGPT) U/L 15  --   --   --   --    AST (SGOT) U/L 25  --   --   --   --    TROPONIN I ng/mL  --   --   --   --  0.055*   < > = values in this interval not displayed.  BNP   Date Value Ref Range Status   11/17/2017 90.0 0.0 - 100.0 pg/mL Final     No results found for: PHART    Microbiology Results Abnormal     Procedure Component Value - Date/Time    Urine Culture - Urine, Urine, Clean Catch [[id number]]  (Normal) Collected:  11/17/17 0946    Lab Status:  Preliminary result Specimen:  Urine from Urine, Clean Catch Updated:  11/18/17 0738     Urine Culture No growth    Blood Culture - Blood, [[id number]]  (Normal) Collected:  11/08/17 2049    Lab Status:  Final result Specimen:  Blood from Hand, Left Updated:  11/13/17 2216     Blood Culture No growth at 5 days    Blood Culture - Blood, [[id number]]  (Normal) Collected:  11/08/17 2055    Lab Status:  Final result Specimen:  Blood from Hand, Right Updated:  11/13/17 2216     Blood Culture No growth at 5 days    Eosinophil Smear - Urine, Urine, Clean Catch [[id number]]  (Normal) Collected:  11/11/17 1030    Lab Status:  Final result Specimen:  Urine from Urine, Clean Catch Updated:  11/11/17 2627      Eosinophil Smear 0 % EOS/100 Cells     Narrative:       No eosinophil seen    Urine Culture - Urine, Urine, Catheter [[id number]]  (Abnormal)  (Susceptibility) Collected:  11/08/17 2131    Lab Status:  Final result Specimen:  Urine from Urine, Catheter Updated:  11/11/17 1157     Urine Culture --      10,000-20,000 CFU/mL Escherichia coli (A)    Susceptibility      Escherichia coli     CHERYL     Ampicillin <=8 ug/ml Susceptible     Ampicillin + Sulbactam <=8/4 ug/ml Susceptible     Aztreonam <=8 ug/ml Susceptible     Cefepime <=8 ug/ml Susceptible     Cefotaxime <=2 ug/ml Susceptible     Ceftriaxone <=8 ug/ml Susceptible     Cefuroxime sodium <=4 ug/ml Susceptible     Cephalothin <=8 ug/ml Susceptible     Ertapenem <=1 ug/ml Susceptible     Gentamicin <=4 ug/ml Susceptible     Levofloxacin <=2 ug/ml Susceptible     Meropenem <=1 ug/ml Susceptible     Nitrofurantoin <=32 ug/ml Susceptible     Piperacillin + Tazobactam <=16 ug/ml Susceptible     Tetracycline <=4 ug/ml Susceptible     Tobramycin <=4 ug/ml Susceptible     Trimethoprim + Sulfamethoxazole <=2/38 ug/ml Susceptible                    Clostridium Difficile Toxin - Stool, Per Rectum [[id number]] Collected:  11/09/17 0701    Lab Status:  Final result Specimen:  Stool from Per Rectum Updated:  11/09/17 0922    Narrative:       The following orders were created for panel order Clostridium Difficile Toxin - Stool, Per Rectum.  Procedure                               Abnormality         Status                     ---------                               -----------         ------                     Clostridium Difficile To...[[id number]]  Normal              Final result                 Please view results for these tests on the individual orders.    Clostridium Difficile Toxin, PCR - Stool, Per Rectum [[id number]]  (Normal) Collected:  11/09/17 0701    Lab Status:  Final result Specimen:  Stool from Per Rectum Updated:  11/09/17 0922     C. Difficile Toxins by PCR Not  Detected    Narrative:         Performance characteristics of test not established for patients <2 years of age.  Negative for Toxigenic C. Difficile          Imaging Results (last 24 hours)     ** No results found for the last 24 hours. **        Results for orders placed during the hospital encounter of 11/08/17   Adult Transthoracic Echo Complete W/ Cont if Necessary Per Protocol    Narrative · Left atrial cavity size is borderline dilated.  · Mild to moderate tricuspid valve regurgitation is present.  · Calculated right ventricular systolic pressure from tricuspid   regurgitation is 40 mmHg.  · Mild aortic valve regurgitation is present.  · Mild mitral valve regurgitation is present.  · Left ventricular systolic function is normal. Estimated EF = 65%.  · There is no evidence of pericardial effusion.  · Mild pulmonary hypertension is present.  · The aortic valve exhibits sclerosis.  · Normal right ventricular cavity size, wall thickness, systolic function   and septal motion noted.  · Left ventricular diastolic function is normal.          I have reviewed the medications.    Assessment/Plan   Assessment / Plan     Hospital Problem List     * (Principal)Sepsis, probable source is urine    Acute renal failure (ARF)    UTI (urinary tract infection)    Gastroenteritis             Brief Hospital Course to date:  Jaclyn Garcia is a 83 y.o. female with past medical history significant for hypertension, hyperlipidemia, GERD.  Patient was brought to the emergency room after few days of diarrhea.  Patient was initially admitted to ICU for sepsis and UTI and also for acute kidney injury.  On admission creatinine was above 4 but it has come down since then.  Patient was transferred from ICU to the floor on 11/15/2017.      Assessment & Plan:    * sudden prop in H/H. spource of bleeding unknown at this point. Pt is clinically stable. S/P transfusion of 2 units, H/H stable for now.    Abdominal pain. KUB shows sarah filed  multiple loops of intestine. No obstruction, no pneumaotosis, improving.    * Urinary tract infection.    * Sepsis secondary to above improved    * Acute kidney injury with creatinine above 4 improving.    *Recent history of diarrhea and abdominal pain.  Abdominal pain has not completely resolved.    * edema of LEBL.    * SOB probably mild pulmonary edema.    * Severe generalized weakness    PLAN:  - cont antibiotics  - lasix  - labs in am  -monitor H/H closely    DVT Prophylaxis:      CODE STATUS: Conditional Code    Disposition: TBD.    Dangelo Lopez MD  11/18/17  3:11 PM

## 2017-11-18 NOTE — PLAN OF CARE
Problem: Patient Care Overview (Adult)  Goal: Plan of Care Review  Outcome: Ongoing (interventions implemented as appropriate)    11/17/17 1524 11/17/17 2030 11/18/17 0501   Coping/Psychosocial Response Interventions   Plan Of Care Reviewed With --  patient;family --    Patient Care Overview   Progress no change --  --    Outcome Evaluation   Outcome Summary/Follow up Plan --  --  Pt has been very resltess tonight. Administered Xanax, which seemed to help. Has rested off and on. Neice at bedside. Vitals stable. Will continue to monitor.       Goal: Adult Individualization and Mutuality  Outcome: Ongoing (interventions implemented as appropriate)  Goal: Discharge Needs Assessment  Outcome: Ongoing (interventions implemented as appropriate)    Problem: Renal Failure/Kidney Injury, Acute (Adult)  Goal: Signs and Symptoms of Listed Potential Problems Will be Absent or Manageable (Renal Failure/Kidney Injury, Acute)  Outcome: Ongoing (interventions implemented as appropriate)    Problem: Sepsis (Adult)  Goal: Signs and Symptoms of Listed Potential Problems Will be Absent or Manageable (Sepsis)  Outcome: Ongoing (interventions implemented as appropriate)    Problem: Pressure Ulcer Risk (Tal Scale) (Adult,Obstetrics,Pediatric)  Goal: Identify Related Risk Factors and Signs and Symptoms  Outcome: Ongoing (interventions implemented as appropriate)  Goal: Skin Integrity  Outcome: Ongoing (interventions implemented as appropriate)    Problem: Fluid Volume Excess (Adult,Obstetrics,Pediatric)  Goal: Identify Related Risk Factors and Signs and Symptoms  Outcome: Ongoing (interventions implemented as appropriate)  Goal: Stable Weight  Outcome: Ongoing (interventions implemented as appropriate)  Goal: Balanced Intake/Output  Outcome: Ongoing (interventions implemented as appropriate)    Problem: Fall Risk (Adult)  Goal: Identify Related Risk Factors and Signs and Symptoms  Outcome: Ongoing (interventions implemented as  appropriate)  Goal: Absence of Falls  Outcome: Ongoing (interventions implemented as appropriate)

## 2017-11-19 ENCOUNTER — APPOINTMENT (OUTPATIENT)
Dept: GENERAL RADIOLOGY | Facility: HOSPITAL | Age: 82
End: 2017-11-19

## 2017-11-19 LAB
ANION GAP SERPL CALCULATED.3IONS-SCNC: 11 MMOL/L (ref 3–11)
BACTERIA SPEC AEROBE CULT: NORMAL
BUN BLD-MCNC: 29 MG/DL (ref 9–23)
BUN/CREAT SERPL: 15.3 (ref 7–25)
CALCIUM SPEC-SCNC: 8.9 MG/DL (ref 8.7–10.4)
CHLORIDE SERPL-SCNC: 101 MMOL/L (ref 99–109)
CO2 SERPL-SCNC: 30 MMOL/L (ref 20–31)
CREAT BLD-MCNC: 1.9 MG/DL (ref 0.6–1.3)
GFR SERPL CREATININE-BSD FRML MDRD: 25 ML/MIN/1.73
GLUCOSE BLD-MCNC: 115 MG/DL (ref 70–100)
HCT VFR BLD AUTO: 27.2 % (ref 34.5–44)
HCT VFR BLD AUTO: 28 % (ref 34.5–44)
HGB BLD-MCNC: 9.1 G/DL (ref 11.5–15.5)
HGB BLD-MCNC: 9.3 G/DL (ref 11.5–15.5)
POTASSIUM BLD-SCNC: 2.8 MMOL/L (ref 3.5–5.5)
POTASSIUM BLD-SCNC: 3.4 MMOL/L (ref 3.5–5.5)
SODIUM BLD-SCNC: 142 MMOL/L (ref 132–146)

## 2017-11-19 PROCEDURE — 94799 UNLISTED PULMONARY SVC/PX: CPT

## 2017-11-19 PROCEDURE — 85014 HEMATOCRIT: CPT | Performed by: INTERNAL MEDICINE

## 2017-11-19 PROCEDURE — 25010000002 FUROSEMIDE PER 20 MG: Performed by: INTERNAL MEDICINE

## 2017-11-19 PROCEDURE — 85018 HEMOGLOBIN: CPT | Performed by: INTERNAL MEDICINE

## 2017-11-19 PROCEDURE — 99233 SBSQ HOSP IP/OBS HIGH 50: CPT | Performed by: INTERNAL MEDICINE

## 2017-11-19 PROCEDURE — 84132 ASSAY OF SERUM POTASSIUM: CPT | Performed by: INTERNAL MEDICINE

## 2017-11-19 PROCEDURE — 71010 HC CHEST PA OR AP: CPT

## 2017-11-19 PROCEDURE — 80048 BASIC METABOLIC PNL TOTAL CA: CPT | Performed by: INTERNAL MEDICINE

## 2017-11-19 PROCEDURE — 25010000002 CEFTRIAXONE PER 250 MG: Performed by: INTERNAL MEDICINE

## 2017-11-19 PROCEDURE — 25010000002 HEPARIN (PORCINE) PER 1000 UNITS: Performed by: INTERNAL MEDICINE

## 2017-11-19 RX ORDER — BUMETANIDE 0.25 MG/ML
2 INJECTION INTRAMUSCULAR; INTRAVENOUS ONCE
Status: COMPLETED | OUTPATIENT
Start: 2017-11-19 | End: 2017-11-19

## 2017-11-19 RX ORDER — POTASSIUM CHLORIDE 750 MG/1
40 CAPSULE, EXTENDED RELEASE ORAL EVERY 4 HOURS
Status: COMPLETED | OUTPATIENT
Start: 2017-11-19 | End: 2017-11-19

## 2017-11-19 RX ORDER — POTASSIUM CHLORIDE 1.5 G/1.77G
40 POWDER, FOR SOLUTION ORAL AS NEEDED
Status: DISCONTINUED | OUTPATIENT
Start: 2017-11-19 | End: 2017-11-20

## 2017-11-19 RX ORDER — HEPARIN SODIUM 5000 [USP'U]/ML
5000 INJECTION, SOLUTION INTRAVENOUS; SUBCUTANEOUS EVERY 8 HOURS SCHEDULED
Status: DISCONTINUED | OUTPATIENT
Start: 2017-11-19 | End: 2017-11-21 | Stop reason: HOSPADM

## 2017-11-19 RX ORDER — POTASSIUM CHLORIDE 750 MG/1
40 CAPSULE, EXTENDED RELEASE ORAL AS NEEDED
Status: DISCONTINUED | OUTPATIENT
Start: 2017-11-19 | End: 2017-11-21 | Stop reason: HOSPADM

## 2017-11-19 RX ORDER — POTASSIUM CHLORIDE 7.45 MG/ML
10 INJECTION INTRAVENOUS
Status: DISCONTINUED | OUTPATIENT
Start: 2017-11-19 | End: 2017-11-20

## 2017-11-19 RX ADMIN — ALPRAZOLAM 0.25 MG: 0.25 TABLET ORAL at 08:57

## 2017-11-19 RX ADMIN — ATORVASTATIN CALCIUM 40 MG: 40 TABLET, FILM COATED ORAL at 21:31

## 2017-11-19 RX ADMIN — POTASSIUM CHLORIDE 40 MEQ: 750 CAPSULE, EXTENDED RELEASE ORAL at 15:42

## 2017-11-19 RX ADMIN — FUROSEMIDE 40 MG: 10 INJECTION, SOLUTION INTRAMUSCULAR; INTRAVENOUS at 08:57

## 2017-11-19 RX ADMIN — PANTOPRAZOLE SODIUM 40 MG: 40 TABLET, DELAYED RELEASE ORAL at 17:39

## 2017-11-19 RX ADMIN — HEPARIN SODIUM 5000 UNITS: 5000 INJECTION, SOLUTION INTRAVENOUS; SUBCUTANEOUS at 15:42

## 2017-11-19 RX ADMIN — PANTOPRAZOLE SODIUM 40 MG: 40 TABLET, DELAYED RELEASE ORAL at 08:57

## 2017-11-19 RX ADMIN — BUMETANIDE 2 MG: 0.25 INJECTION INTRAMUSCULAR; INTRAVENOUS at 15:43

## 2017-11-19 RX ADMIN — CEFTRIAXONE SODIUM 1 G: 1 INJECTION, SOLUTION INTRAVENOUS at 15:42

## 2017-11-19 RX ADMIN — HEPARIN SODIUM 5000 UNITS: 5000 INJECTION, SOLUTION INTRAVENOUS; SUBCUTANEOUS at 21:31

## 2017-11-19 RX ADMIN — Medication 5 MG: at 21:31

## 2017-11-19 RX ADMIN — ALPRAZOLAM 0.25 MG: 0.25 TABLET ORAL at 21:31

## 2017-11-19 RX ADMIN — POTASSIUM CHLORIDE 40 MEQ: 750 CAPSULE, EXTENDED RELEASE ORAL at 11:49

## 2017-11-19 NOTE — PLAN OF CARE
Problem: Pressure Ulcer Risk (Tal Scale) (Adult,Obstetrics,Pediatric)  Goal: Skin Integrity  Outcome: Ongoing (interventions implemented as appropriate)

## 2017-11-19 NOTE — PLAN OF CARE
Problem: Fluid Volume Excess (Adult,Obstetrics,Pediatric)  Goal: Stable Weight  Outcome: Ongoing (interventions implemented as appropriate)

## 2017-11-19 NOTE — PROGRESS NOTES
"   LOS: 11 days    Patient Care Team:  No Known Provider as PCP - General    Reason For Visit:  F/U ELLE  Subjective   No new events, no obvious distress.    Review of Systems:    Eyes any nausea, vomiting, chest pain, shortness of breath, dysuria, hematuria..      Objective       atorvastatin 40 mg Oral Nightly   bumetanide 2 mg Intravenous Once   ceftriaxone 1 g Intravenous Q24H   heparin (porcine) 5,000 Units Subcutaneous Q8H   pantoprazole 40 mg Oral BID AC   potassium chloride 40 mEq Oral Q4H            Vital Signs:  Blood pressure 141/96, pulse 93, temperature 98.4 °F (36.9 °C), temperature source Oral, resp. rate 17, height 63\" (160 cm), weight 156 lb 3.2 oz (70.9 kg), SpO2 95 %.    Flowsheet Rows         First Filed Value    Admission Height  63\" (160 cm) Documented at 11/08/2017 2018    Admission Weight  166 lb 0.1 oz (75.3 kg) Documented at 11/08/2017 2018 11/18 0701 - 11/19 0700  In: 600 [P.O.:600]  Out: 3300 [Urine:3300]    Physical Exam:    General Appearance:Alert oriented ×3, no obvious distress,  Eyes: PER, EOMI   Lungs: Rhonchi's and Rales are heard at the bases.  Good air entry.  Equal chest movement  Heart/CV: regular rhythm & normal rate, no murmur, no gallop, no rub and +1+ edema, 2+ on left edema  Abdomen: not distended, soft, non-tender, no masses,  bowel sounds present  Skin: No rash, Warm and dry  Extremities: Positive edema.  No cyanosis.  Neuro: Grossly intact alert oriented ×3.  No focal deficit.    Radiology:    Labs:    Results from last 7 days  Lab Units 11/19/17  0820 11/19/17  0023 11/18/17  1526 11/18/17  0754  11/17/17  0604 11/15/17  0415   WBC 10*3/mm3  --   --   --  14.32*  --  14.57* 10.90*   HEMOGLOBIN g/dL 9.3* 9.1* 8.7* 8.8*  9.0*  < > 5.7* 9.0*   HEMATOCRIT % 28.0* 27.2* 25.8* 26.4*  27.5*  < > 17.9* 27.6*   PLATELETS 10*3/mm3  --   --   --  328  --  286 217   < > = values in this interval not displayed.    Results from last 7 days  Lab Units 11/19/17  0820 " 11/18/17  0417 11/17/17  0401 11/16/17  0427 11/15/17  0415   SODIUM mmol/L 142 142 141 140 143   POTASSIUM mmol/L 2.8* 3.5 3.8 3.4* 3.5   CHLORIDE mmol/L 101 107 107 106 108   CO2 mmol/L 30.0 24.0 25.0 25.0 24.0   BUN mg/dL 29* 26* 33* 39* 49*   CREATININE mg/dL 1.90* 2.00* 2.20* 2.60* 3.10*   CALCIUM mg/dL 8.9 8.9 8.7 9.0 8.7   PHOSPHORUS mg/dL  --  3.8 3.9 3.9 4.2   MAGNESIUM mg/dL  --   --  1.7  --  2.0   ALBUMIN g/dL  --  3.20 3.20 3.20 3.30       Results from last 7 days  Lab Units 11/19/17  0820   GLUCOSE mg/dL 115*         Results from last 7 days  Lab Units 11/18/17  0417   ALK PHOS U/L 57   BILIRUBIN mg/dL 1.2   ALT (SGPT) U/L 15   AST (SGOT) U/L 25       Results from last 7 days  Lab Units 11/12/17  1347   PH, ARTERIAL pH units 7.331*   PO2 ART mm Hg 77.3*   PCO2, ARTERIAL mm Hg 39.3   HCO3 ART mmol/L 20.8         Results from last 7 days  Lab Units 11/17/17  0946   COLOR UA  Yellow   CLARITY UA  Clear   PH, URINE  6.5   SPECIFIC GRAVITY, URINE  <=1.005   GLUCOSE UA  Negative   KETONES UA  Negative   BILIRUBIN UA  Negative   PROTEIN UA  Trace*   BLOOD UA  Moderate (2+)*   LEUKOCYTES UA  Small (1+)*   NITRITE UA  Negative            Assessment     Principal Problem:    Sepsis, probable source is urine  Active Problems:    Acute renal failure (ARF)    UTI (urinary tract infection)    Gastroenteritis    Impression: ELLE SECONDARY TO NSAIA USE AND SEPSIS. Renal function Stable slow improvement, Electrolytes will be replaced.  Anemia Stable  Edema.     Continue with the current diuretics today watch electrolytes, okay to be discharged from renal point a few any time.   TO REHAB SOON. WE CAN F/U IN OUR [place] [facility] AFTER DISCHARGE.       Zara Wilde MD  11/19/17  1:17 PM

## 2017-11-19 NOTE — PROGRESS NOTES
[facility] Medicine Services  PROGRESS NOTE    Patient Name: Jaclyn Garcia  : 1934  MRN: [id number]    Date of Admission: 2017  Length of Stay: 11  Primary Care Physician: No Known Provider    Subjective   Subjective     CC: abd pain and diarrhea    HPI:No acute events overnight, patient states that she had a good night, denies any fever or chills, no n/v/d    Review of Systems  Gen- No fevers, chills  CV- No chest pain, palpitations  Resp- No cough, dyspnea  GI- No N/V/D, abd pain    Otherwise ROS is negative except as mentioned in the HPI.    Objective   Objective     Vital Signs:   Temp:  [98 °F (36.7 °C)-99 °F (37.2 °C)] 98.8 °F (37.1 °C)  Heart Rate:  [] 92  Resp:  [16-18] 18  BP: (133-155)/(68-96) 155/68      Physical Exam:  Constitutional: No acute distress, awake, alert,   HENT: NCAT, mucous membranes moist  Respiratory: Clear to auscultation bilaterally, respiratory effort normal   Cardiovascular: RRR, no murmurs, rubs, or gallops, palpable pedal pulses bilaterally  Gastrointestinal: Positive bowel sounds, soft, nontender, nondistended  Musculoskeletal: bilateral ankle edema 2+  Psychiatric: Appropriate affect, cooperative  Neurologic: Oriented x 3, strength symmetric in all extremities, Cranial Nerves grossly intact to confrontation, speech clear  Skin: No rashes    Results Reviewed:  I have personally reviewed current lab, radiology, and data and agree.      Results from last 7 days  Lab Units 17  0820 17  0023 17  1526 17  0754  17  1443 17  0604 11/15/17  0415   WBC 10*3/mm3  --   --   --  14.32*  --   --  14.57* 10.90*   HEMOGLOBIN g/dL 9.3* 9.1* 8.7* 8.8*  9.0*  < >  --  5.7* 9.0*   HEMATOCRIT % 28.0* 27.2* 25.8* 26.4*  27.5*  < >  --  17.9* 27.6*   PLATELETS 10*3/mm3  --   --   --  328  --   --  286 217   INR   --   --   --   --   --  1.21  --   --    < > = values in this interval not displayed.    Results from last  7 days  Lab Units 11/19/17  0820 11/18/17  0417 11/17/17  0401  11/12/17  1326   SODIUM mmol/L 142 142 141  < >  --    POTASSIUM mmol/L 2.8* 3.5 3.8  < >  --    CHLORIDE mmol/L 101 107 107  < >  --    CO2 mmol/L 30.0 24.0 25.0  < >  --    BUN mg/dL 29* 26* 33*  < >  --    CREATININE mg/dL 1.90* 2.00* 2.20*  < >  --    GLUCOSE mg/dL 115* 96 114*  < >  --    CALCIUM mg/dL 8.9 8.9 8.7  < >  --    ALT (SGPT) U/L  --  15  --   --   --    AST (SGOT) U/L  --  25  --   --   --    TROPONIN I ng/mL  --   --   --   --  0.055*   < > = values in this interval not displayed.  BNP   Date Value Ref Range Status   11/17/2017 90.0 0.0 - 100.0 pg/mL Final     No results found for: PHART    Microbiology Results Abnormal     Procedure Component Value - Date/Time    Urine Culture - Urine, Urine, Clean Catch [[id number]]  (Normal) Collected:  11/17/17 0946    Lab Status:  Final result Specimen:  Urine from Urine, Clean Catch Updated:  11/19/17 0743     Urine Culture No growth at 2 days    Blood Culture - Blood, [[id number]]  (Normal) Collected:  11/08/17 2049    Lab Status:  Final result Specimen:  Blood from Hand, Left Updated:  11/13/17 2216     Blood Culture No growth at 5 days    Blood Culture - Blood, [[id number]]  (Normal) Collected:  11/08/17 2055    Lab Status:  Final result Specimen:  Blood from Hand, Right Updated:  11/13/17 2216     Blood Culture No growth at 5 days    Eosinophil Smear - Urine, Urine, Clean Catch [[id number]]  (Normal) Collected:  11/11/17 1030    Lab Status:  Final result Specimen:  Urine from Urine, Clean Catch Updated:  11/11/17 1547     Eosinophil Smear 0 % EOS/100 Cells     Narrative:       No eosinophil seen    Urine Culture - Urine, Urine, Catheter [[id number]]  (Abnormal)  (Susceptibility) Collected:  11/08/17 2131    Lab Status:  Final result Specimen:  Urine from Urine, Catheter Updated:  11/11/17 0674     Urine Culture --      10,000-20,000 CFU/mL Escherichia coli (A)    Susceptibility      Escherichia  coli     CHERYL     Ampicillin <=8 ug/ml Susceptible     Ampicillin + Sulbactam <=8/4 ug/ml Susceptible     Aztreonam <=8 ug/ml Susceptible     Cefepime <=8 ug/ml Susceptible     Cefotaxime <=2 ug/ml Susceptible     Ceftriaxone <=8 ug/ml Susceptible     Cefuroxime sodium <=4 ug/ml Susceptible     Cephalothin <=8 ug/ml Susceptible     Ertapenem <=1 ug/ml Susceptible     Gentamicin <=4 ug/ml Susceptible     Levofloxacin <=2 ug/ml Susceptible     Meropenem <=1 ug/ml Susceptible     Nitrofurantoin <=32 ug/ml Susceptible     Piperacillin + Tazobactam <=16 ug/ml Susceptible     Tetracycline <=4 ug/ml Susceptible     Tobramycin <=4 ug/ml Susceptible     Trimethoprim + Sulfamethoxazole <=2/38 ug/ml Susceptible                    Clostridium Difficile Toxin - Stool, Per Rectum [[id number]] Collected:  11/09/17 0701    Lab Status:  Final result Specimen:  Stool from Per Rectum Updated:  11/09/17 0922    Narrative:       The following orders were created for panel order Clostridium Difficile Toxin - Stool, Per Rectum.  Procedure                               Abnormality         Status                     ---------                               -----------         ------                     Clostridium Difficile To...[[id number]]  Normal              Final result                 Please view results for these tests on the individual orders.    Clostridium Difficile Toxin, PCR - Stool, Per Rectum [[id number]]  (Normal) Collected:  11/09/17 0701    Lab Status:  Final result Specimen:  Stool from Per Rectum Updated:  11/09/17 0922     C. Difficile Toxins by PCR Not Detected    Narrative:         Performance characteristics of test not established for patients <2 years of age.  Negative for Toxigenic C. Difficile          Imaging Results (last 24 hours)     Procedure Component Value Units Date/Time    XR Chest 1 View [[id number]] Updated:  11/19/17 0323        Results for orders placed during the hospital encounter of 11/08/17   Adult  Transthoracic Echo Complete W/ Cont if Necessary Per Protocol    Narrative · Left atrial cavity size is borderline dilated.  · Mild to moderate tricuspid valve regurgitation is present.  · Calculated right ventricular systolic pressure from tricuspid   regurgitation is 40 mmHg.  · Mild aortic valve regurgitation is present.  · Mild mitral valve regurgitation is present.  · Left ventricular systolic function is normal. Estimated EF = 65%.  · There is no evidence of pericardial effusion.  · Mild pulmonary hypertension is present.  · The aortic valve exhibits sclerosis.  · Normal right ventricular cavity size, wall thickness, systolic function   and septal motion noted.  · Left ventricular diastolic function is normal.          I have reviewed the medications.    Assessment/Plan   Assessment / Plan     Hospital Problem List     * (Principal)Sepsis, probable source is urine    Acute renal failure (ARF)    UTI (urinary tract infection)    Gastroenteritis        Brief Hospital Course to date:  Jaclyn Garcia is a 83 y.o. female past medical history significant for hypertension, hyperlipidemia, GERD.  Patient was brought to the emergency room after few days of diarrhea.  Patient was initially admitted to ICU for sepsis and UTI and also for acute kidney injury.  On admission creatinine was above 4 but it has come down since then.  Patient was transferred from ICU to the floor on 11/15/2017.    Assessment & Plan:  - Sepsis suspected to be secondary to Ecoli UTI, resolving, continue abx.  - Anemia- had drop in h/h, no source of bleeding found, she is s/p 2 units PRBC, continue to monitor h/h, stable for now  - Abd pain and diarrhea, KUB unrevealing, continue supportive therapy.  - ELLE suspected to be sec to sepsis vs NSAID use, Cr peaked to 4 now at 2, renal following,  - generalized weakness/deconditioning, will benefit from rehab.  - Hypertension- currently stable, home rx held sec to sepsis and ELLE, will consider  restarting.  - Monitor and replete electrolytes    DVT Prophylaxis: SQH    CODE STATUS: Conditional Code    Disposition: TBD, will need rehab CM following,     Tory Martínez MD  11/19/17  11:41 AM

## 2017-11-20 LAB
ALBUMIN SERPL-MCNC: 3.4 G/DL (ref 3.2–4.8)
ANION GAP SERPL CALCULATED.3IONS-SCNC: 11 MMOL/L (ref 3–11)
BUN BLD-MCNC: 29 MG/DL (ref 9–23)
BUN/CREAT SERPL: 16.1 (ref 7–25)
CALCIUM SPEC-SCNC: 8.5 MG/DL (ref 8.7–10.4)
CHLORIDE SERPL-SCNC: 100 MMOL/L (ref 99–109)
CO2 SERPL-SCNC: 30 MMOL/L (ref 20–31)
CREAT BLD-MCNC: 1.8 MG/DL (ref 0.6–1.3)
GFR SERPL CREATININE-BSD FRML MDRD: 27 ML/MIN/1.73
GLUCOSE BLD-MCNC: 109 MG/DL (ref 70–100)
HCT VFR BLD AUTO: 27.1 % (ref 34.5–44)
HGB BLD-MCNC: 8.7 G/DL (ref 11.5–15.5)
PHOSPHATE SERPL-MCNC: 3.7 MG/DL (ref 2.4–5.1)
POTASSIUM BLD-SCNC: 3.4 MMOL/L (ref 3.5–5.5)
POTASSIUM BLD-SCNC: 4.2 MMOL/L (ref 3.5–5.5)
SODIUM BLD-SCNC: 141 MMOL/L (ref 132–146)

## 2017-11-20 PROCEDURE — 25010000002 CEFTRIAXONE PER 250 MG: Performed by: INTERNAL MEDICINE

## 2017-11-20 PROCEDURE — 97110 THERAPEUTIC EXERCISES: CPT

## 2017-11-20 PROCEDURE — 85018 HEMOGLOBIN: CPT | Performed by: INTERNAL MEDICINE

## 2017-11-20 PROCEDURE — 85014 HEMATOCRIT: CPT | Performed by: INTERNAL MEDICINE

## 2017-11-20 PROCEDURE — 25010000002 HEPARIN (PORCINE) PER 1000 UNITS: Performed by: INTERNAL MEDICINE

## 2017-11-20 PROCEDURE — 84132 ASSAY OF SERUM POTASSIUM: CPT | Performed by: INTERNAL MEDICINE

## 2017-11-20 PROCEDURE — 99232 SBSQ HOSP IP/OBS MODERATE 35: CPT | Performed by: INTERNAL MEDICINE

## 2017-11-20 PROCEDURE — 97116 GAIT TRAINING THERAPY: CPT

## 2017-11-20 PROCEDURE — 80069 RENAL FUNCTION PANEL: CPT | Performed by: INTERNAL MEDICINE

## 2017-11-20 RX ADMIN — PANTOPRAZOLE SODIUM 40 MG: 40 TABLET, DELAYED RELEASE ORAL at 18:26

## 2017-11-20 RX ADMIN — HEPARIN SODIUM 5000 UNITS: 5000 INJECTION, SOLUTION INTRAVENOUS; SUBCUTANEOUS at 22:25

## 2017-11-20 RX ADMIN — PANTOPRAZOLE SODIUM 40 MG: 40 TABLET, DELAYED RELEASE ORAL at 08:20

## 2017-11-20 RX ADMIN — POTASSIUM CHLORIDE 40 MEQ: 750 CAPSULE, EXTENDED RELEASE ORAL at 01:48

## 2017-11-20 RX ADMIN — HEPARIN SODIUM 5000 UNITS: 5000 INJECTION, SOLUTION INTRAVENOUS; SUBCUTANEOUS at 15:15

## 2017-11-20 RX ADMIN — ALPRAZOLAM 0.25 MG: 0.25 TABLET ORAL at 08:20

## 2017-11-20 RX ADMIN — HEPARIN SODIUM 5000 UNITS: 5000 INJECTION, SOLUTION INTRAVENOUS; SUBCUTANEOUS at 06:14

## 2017-11-20 RX ADMIN — POTASSIUM CHLORIDE 40 MEQ: 1.5 POWDER, FOR SOLUTION ORAL at 06:19

## 2017-11-20 RX ADMIN — Medication 5 MG: at 22:27

## 2017-11-20 RX ADMIN — CEFTRIAXONE SODIUM 1 G: 1 INJECTION, SOLUTION INTRAVENOUS at 15:16

## 2017-11-20 RX ADMIN — ATORVASTATIN CALCIUM 40 MG: 40 TABLET, FILM COATED ORAL at 22:25

## 2017-11-20 NOTE — PROGRESS NOTES
Adult Nutrition  Assessment/PES    Patient Name:  Jaclyn Garcia  YOB: 1934  MRN: [id number]  Admit Date:  11/8/2017    Assessment Date:  11/20/2017    Comments:            Reason for Assessment       11/20/17 1421    Reason for Assessment    Reason For Assessment/Visit follow up protocol;nurse/nurse practitioner consult    Time Spent (min) 30    Diagnosis --   SEE PREVIOUS NUTRITION NOTES    Cardiac Other (comment)   HX OF HTN & HLP    Gastrointestinal Other (comment)   HX OF GERD    Psychosocial Other (comment)   HX OF ANXIETY    Renal Other (comment)   ELLE IMPROVING    Other diagnosis GENERALIZED WEAKNESS/DECONDITIONING               Nutrition/Diet History       11/20/17 1425    Nutrition/Diet History    Functional Status/Food Prep Mobility --    REPORTS PT NOT A BIG EATER NORMALLY. HE REPORTS CURRENTLY SHE HAS NO INTEREST IN EVEN HER FAVORITE FOODS. HE REPORTS SHE IS DRINING BETTER THAN EATING. SHE DOES LIKE MILK BUT NOT DRINKING CURRENT MIGHTLY SHAKES WELL. SHE REPORTS WOULD RATHER     Food Allergies/Intolerances --   DRINK THE CLEAR LIQUID SUPPLEMENT.  PROVIDED MOST LIKELY FOOD PT WOULD EAT PRESENTLY    Reported/Observed By Patient;Family              Labs/Tests/Procedures/Meds       11/20/17 1428    Labs/Tests/Procedures/Meds    Labs/Tests Review Reviewed    Medication Review Reviewed, pertinent            Physical Findings       11/20/17 1428    Physical Appearance    Gastrointestinal other (see comments)   WDL PER NURSING DOCUMENTATION    Skin other (see comments)   WDL PER NURSING DOCUMENTATION              Nutrition Prescription Ordered       11/20/17 1431    Nutrition Prescription PO    Current PO Diet Regular    Supplement Mighty Shake    Supplement Frequency 2 times a day            Evaluation of Received Nutrient/Fluid Intake       11/20/17 1432    PO Evaluation    Number of Meals 3    % PO Intake 12            Problem/Interventions:        Problem 1       11/20/17 1432     Nutrition Diagnoses Problem 1    Problem 1 Inadequate Intake/Infusion    Inadequate Intake Type Oral    Etiology (related to) --   clinical condition/poor appetite    Signs/Symptoms (evidenced by) PO Intake    Percent (%) intake recorded 12 %    Over number of meals 3                    Intervention Goal       11/20/17 1433    Intervention Goal    General Nutrition support treatment   INFORMED PT & FAMILY IF PT COULD DRINK 3 BOOST BREEZES, 3     PO Increase intake;Tolerate PO   2% MILKS & 25% OF MEALS, NUTRITION NEEDS SHOULD BE MET            Nutrition Intervention       11/20/17 1433    Nutrition Intervention    RD/Tech Action Advise alternate selection;Advise available snack;Interview for preference;Menu adjusted;Adjusted supplement;Encourage intake;Follow Tx progress;Care plan reviewd            Nutrition Prescription       11/20/17 1433    Nutrition Prescription PO    PO Prescription Begin/change supplement    Supplement Boost Breeze    Supplement Frequency 3 times a day            Education/Evaluation       11/20/17 1433    Monitor/Evaluation    Monitor Per protocol;I&O;PO intake;Supplement intake;Pertinent labs;Weight;Skin status;Symptoms        Electronically signed by:  Claire Arreaga RD  11/20/17 2:35 PM

## 2017-11-20 NOTE — PROGRESS NOTES
Continued Stay Note   [place]     Patient Name: Jaclyn Garcia  MRN: [id number]  Today's Date: 11/20/2017    Admit Date: 11/8/2017          Discharge Plan       11/20/17 1530    Case Management/Social Work Plan    Plan [facility]    Patient/Family In Agreement With Plan yes    Additional Comments Per Jose at [facility] they have received insurance approval and are able to accept patient tomorrow if medically ready. Nurse to call report to [phone], dc summary to be faxed to [phone]. APRN notified. Familiy transporting. CM will follow.               Discharge Codes     None        Expected Discharge Date and Time     Expected Discharge Date Expected Discharge Time    Nov 22, 2017             Selena Patton RN

## 2017-11-20 NOTE — PROGRESS NOTES
[facility] Medicine Services  PROGRESS NOTE    Patient Name: Jaclyn Garcia  : 1934  MRN: [id number]    Date of Admission: 2017  Length of Stay: 12  Primary Care Physician: No Known Provider    Subjective   Subjective     CC:abd pain and diarrhea    HPI:No acute events overnight, patient states that she had a good night, worked with PT this morning, does have some n/v    Review of Systems  Gen- No fevers, chills  CV- No chest pain, palpitations  Resp- No cough, dyspnea  GI- V/D, +nausea, abd pain    Otherwise ROS is negative except as mentioned in the HPI.    Objective   Objective     Vital Signs:   Temp:  [98.1 °F (36.7 °C)-98.6 °F (37 °C)] 98.1 °F (36.7 °C)  Heart Rate:  [] 91  Resp:  [14-18] 16  BP: (120-154)/(68-88) 120/68      Physical Exam:  Constitutional: No acute distress, awake, alert, seated in chair  HENT: NCAT, mucous membranes moist  Respiratory: Clear to auscultation bilaterally, respiratory effort normal   Cardiovascular: RRR, no murmurs, rubs, or gallops, palpable pedal pulses bilaterally  Gastrointestinal: Positive bowel sounds, soft, nontender, nondistended  Musculoskeletal: No bilateral ankle edema  Psychiatric: Appropriate affect, cooperative  Neurologic: Oriented x 3, strength symmetric in all extremities, Cranial Nerves grossly intact to confrontation, speech clear  Skin: No rashes    Results Reviewed:  I have personally reviewed current lab, radiology, and data and agree.      Results from last 7 days  Lab Units 17  0424 17  0820 17  0023  17  0754  17  1443 17  0604 11/15/17  0415   WBC 10*3/mm3  --   --   --   --  14.32*  --   --  14.57* 10.90*   HEMOGLOBIN g/dL 8.7* 9.3* 9.1*  < > 8.8*  9.0*  < >  --  5.7* 9.0*   HEMATOCRIT % 27.1* 28.0* 27.2*  < > 26.4*  27.5*  < >  --  17.9* 27.6*   PLATELETS 10*3/mm3  --   --   --   --  328  --   --  286 217   INR   --   --   --   --   --   --  1.21  --   --    < > =  values in this interval not displayed.    Results from last 7 days  Lab Units 11/20/17  0424 11/19/17 2025 11/19/17  0820 11/18/17  0417   SODIUM mmol/L 141  --  142 142   POTASSIUM mmol/L 3.4* 3.4* 2.8* 3.5   CHLORIDE mmol/L 100  --  101 107   CO2 mmol/L 30.0  --  30.0 24.0   BUN mg/dL 29*  --  29* 26*   CREATININE mg/dL 1.80*  --  1.90* 2.00*   GLUCOSE mg/dL 109*  --  115* 96   CALCIUM mg/dL 8.5*  --  8.9 8.9   ALT (SGPT) U/L  --   --   --  15   AST (SGOT) U/L  --   --   --  25     No results found for: BNP  No results found for: PHART    Microbiology Results Abnormal     Procedure Component Value - Date/Time    Urine Culture - Urine, Urine, Clean Catch [[id number]]  (Normal) Collected:  11/17/17 0946    Lab Status:  Final result Specimen:  Urine from Urine, Clean Catch Updated:  11/19/17 0743     Urine Culture No growth at 2 days    Blood Culture - Blood, [[id number]]  (Normal) Collected:  11/08/17 2049    Lab Status:  Final result Specimen:  Blood from Hand, Left Updated:  11/13/17 2216     Blood Culture No growth at 5 days    Blood Culture - Blood, [[id number]]  (Normal) Collected:  11/08/17 2055    Lab Status:  Final result Specimen:  Blood from Hand, Right Updated:  11/13/17 2216     Blood Culture No growth at 5 days    Eosinophil Smear - Urine, Urine, Clean Catch [[id number]]  (Normal) Collected:  11/11/17 1030    Lab Status:  Final result Specimen:  Urine from Urine, Clean Catch Updated:  11/11/17 1547     Eosinophil Smear 0 % EOS/100 Cells     Narrative:       No eosinophil seen    Urine Culture - Urine, Urine, Catheter [[id number]]  (Abnormal)  (Susceptibility) Collected:  11/08/17 2131    Lab Status:  Final result Specimen:  Urine from Urine, Catheter Updated:  11/11/17 1157     Urine Culture --      10,000-20,000 CFU/mL Escherichia coli (A)    Susceptibility      Escherichia coli     CHERYL     Ampicillin <=8 ug/ml Susceptible     Ampicillin + Sulbactam <=8/4 ug/ml Susceptible     Aztreonam <=8 ug/ml  Susceptible     Cefepime <=8 ug/ml Susceptible     Cefotaxime <=2 ug/ml Susceptible     Ceftriaxone <=8 ug/ml Susceptible     Cefuroxime sodium <=4 ug/ml Susceptible     Cephalothin <=8 ug/ml Susceptible     Ertapenem <=1 ug/ml Susceptible     Gentamicin <=4 ug/ml Susceptible     Levofloxacin <=2 ug/ml Susceptible     Meropenem <=1 ug/ml Susceptible     Nitrofurantoin <=32 ug/ml Susceptible     Piperacillin + Tazobactam <=16 ug/ml Susceptible     Tetracycline <=4 ug/ml Susceptible     Tobramycin <=4 ug/ml Susceptible     Trimethoprim + Sulfamethoxazole <=2/38 ug/ml Susceptible                    Clostridium Difficile Toxin - Stool, Per Rectum [[id number]] Collected:  11/09/17 0701    Lab Status:  Final result Specimen:  Stool from Per Rectum Updated:  11/09/17 0922    Narrative:       The following orders were created for panel order Clostridium Difficile Toxin - Stool, Per Rectum.  Procedure                               Abnormality         Status                     ---------                               -----------         ------                     Clostridium Difficile To...[[id number]]  Normal              Final result                 Please view results for these tests on the individual orders.    Clostridium Difficile Toxin, PCR - Stool, Per Rectum [[id number]]  (Normal) Collected:  11/09/17 0701    Lab Status:  Final result Specimen:  Stool from Per Rectum Updated:  11/09/17 0922     C. Difficile Toxins by PCR Not Detected    Narrative:         Performance characteristics of test not established for patients <2 years of age.  Negative for Toxigenic C. Difficile          Imaging Results (last 24 hours)     Procedure Component Value Units Date/Time    XR Chest 1 View [[id number]] Collected:  11/19/17 1410     Updated:  11/19/17 2156    Narrative:          EXAMINATION: XR CHEST 1 VW - 11/19/2017     INDICATION: A41.51-Sepsis due to Escherichia coli (e. coli); N17.9-Acute  kidney failure, unspecified; Z74.09-Other  reduced mobility.      COMPARISON: 11/14/2017.     FINDINGS: Left IJ catheter remains in the distal left brachiocephalic  vein or proximal SVC. Heart is normal in size. Vasculature is upper  limits of normal. There is improving left basilar atelectasis plus/minus  effusion. Lungs appear clear elsewhere. Increased upper abdominal bowel  gas may represent low-level ileus.           Impression:       Improving aeration of the left lung base. No new chest  disease is seen.     DICTATED:     11/19/2017  EDITED:          11/19/2017        This report was finalized on 11/19/2017 9:54 PM by DR. Rolly Stephen MD.           Results for orders placed during the hospital encounter of 11/08/17   Adult Transthoracic Echo Complete W/ Cont if Necessary Per Protocol    Narrative · Left atrial cavity size is borderline dilated.  · Mild to moderate tricuspid valve regurgitation is present.  · Calculated right ventricular systolic pressure from tricuspid   regurgitation is 40 mmHg.  · Mild aortic valve regurgitation is present.  · Mild mitral valve regurgitation is present.  · Left ventricular systolic function is normal. Estimated EF = 65%.  · There is no evidence of pericardial effusion.  · Mild pulmonary hypertension is present.  · The aortic valve exhibits sclerosis.  · Normal right ventricular cavity size, wall thickness, systolic function   and septal motion noted.  · Left ventricular diastolic function is normal.          I have reviewed the medications.    Assessment/Plan   Assessment / Plan     Hospital Problem List     * (Principal)Sepsis, probable source is urine    Acute renal failure (ARF)    UTI (urinary tract infection)    Gastroenteritis           Brief Hospital Course to date:  Jaclyn Garcia is a 83 y.o. female past medical history significant for hypertension, hyperlipidemia, GERD.  Patient was brought to the emergency room after few days of diarrhea.  Patient was initially admitted to ICU for sepsis and UTI and also  for acute kidney injury.  On admission creatinine was above 4 but it has come down since then.  Patient was transferred from ICU to the floor on 11/15/2017.     Assessment & Plan:  - Sepsis suspected to be secondary to Ecoli UTI, resolving, continue abx.  - Anemia- had drop in h/h, no source of bleeding found, she is s/p 2 units PRBC, continue to monitor h/h, stable for now  - Abd pain and diarrhea, KUB unrevealing, continue supportive therapy.  - ELLE suspected to be sec to sepsis vs NSAID use, Cr peaked to 4 now at 2, renal following,  - generalized weakness/deconditioning, will benefit from rehab.  - Hypertension- currently stable, home rx held sec to sepsis and ELLE, will consider restarting.  - Monitor and replete electrolytes     DVT Prophylaxis: [facility]     CODE STATUS: Conditional Code     Disposition: TBD, will need rehab CM following,     Tory Martínez MD  11/20/17  9:44 AM

## 2017-11-20 NOTE — PROGRESS NOTES
"   LOS: 12 days    Patient Care Team:  No Known Provider as PCP - General    Reason For Visit:  F/U ELLE  Subjective   No new events stable.    Review of Systems:   Denies any nausea vomiting, no chest pain or shortness of breath, no dysuria or hematuria.      Objective       atorvastatin 40 mg Oral Nightly   ceftriaxone 1 g Intravenous Q24H   heparin (porcine) 5,000 Units Subcutaneous Q8H   pantoprazole 40 mg Oral BID AC            Vital Signs:  Blood pressure 120/68, pulse 97, temperature 98.1 °F (36.7 °C), temperature source Oral, resp. rate 14, height 63\" (160 cm), weight 154 lb 4.8 oz (70 kg), SpO2 96 %.    Flowsheet Rows         First Filed Value    Admission Height  63\" (160 cm) Documented at 11/08/2017 2018    Admission Weight  166 lb 0.1 oz (75.3 kg) Documented at 11/08/2017 2018 11/19 0701 - 11/20 0700  In: 480 [P.O.:480]  Out: 4500 [Urine:4500]    Physical Exam:    General Appearance:Awake, alert, no obvious distress.  Eyes: PER, EOMI   Lungs: Rhonchi's and Rales are heard at the bases.  Good air entry.  Equal chest movement  Heart/CV: regular rhythm & normal rate, no murmur, no gallop, no rub and trace right edema, 1+ on left edema  Abdomen: not distended, soft, non-tender, no masses,  bowel sounds present  Skin: No rash, Warm and dry  Extremities: Positive edema.  No cyanosis.  Neuro: Grossly intact alert oriented ×3.  No focal deficit.    Radiology:    Labs:    Results from last 7 days  Lab Units 11/20/17  0424 11/19/17  0820 11/19/17  0023  11/18/17  0754  11/17/17  0604 11/15/17  0415   WBC 10*3/mm3  --   --   --   --  14.32*  --  14.57* 10.90*   HEMOGLOBIN g/dL 8.7* 9.3* 9.1*  < > 8.8*  9.0*  < > 5.7* 9.0*   HEMATOCRIT % 27.1* 28.0* 27.2*  < > 26.4*  27.5*  < > 17.9* 27.6*   PLATELETS 10*3/mm3  --   --   --   --  328  --  286 217   < > = values in this interval not displayed.    Results from last 7 days  Lab Units 11/20/17  1041 11/20/17  0424 11/19/17 2025 11/19/17  0820 11/18/17  0417 " 11/17/17  0401 11/16/17  0427 11/15/17  0415   SODIUM mmol/L  --  141  --  142 142 141 140 143   POTASSIUM mmol/L 4.2 3.4* 3.4* 2.8* 3.5 3.8 3.4* 3.5   CHLORIDE mmol/L  --  100  --  101 107 107 106 108   CO2 mmol/L  --  30.0  --  30.0 24.0 25.0 25.0 24.0   BUN mg/dL  --  29*  --  29* 26* 33* 39* 49*   CREATININE mg/dL  --  1.80*  --  1.90* 2.00* 2.20* 2.60* 3.10*   CALCIUM mg/dL  --  8.5*  --  8.9 8.9 8.7 9.0 8.7   PHOSPHORUS mg/dL  --  3.7  --   --  3.8 3.9 3.9 4.2   MAGNESIUM mg/dL  --   --   --   --   --  1.7  --  2.0   ALBUMIN g/dL  --  3.40  --   --  3.20 3.20 3.20 3.30       Results from last 7 days  Lab Units 11/20/17  0424   GLUCOSE mg/dL 109*         Results from last 7 days  Lab Units 11/18/17  0417   ALK PHOS U/L 57   BILIRUBIN mg/dL 1.2   ALT (SGPT) U/L 15   AST (SGOT) U/L 25             Results from last 7 days  Lab Units 11/17/17  0946   COLOR UA  Yellow   CLARITY UA  Clear   PH, URINE  6.5   SPECIFIC GRAVITY, URINE  <=1.005   GLUCOSE UA  Negative   KETONES UA  Negative   BILIRUBIN UA  Negative   PROTEIN UA  Trace*   BLOOD UA  Moderate (2+)*   LEUKOCYTES UA  Small (1+)*   NITRITE UA  Negative            Assessment     Principal Problem:    Sepsis, probable source is urine  Active Problems:    Acute renal failure (ARF)    UTI (urinary tract infection)    Gastroenteritis    Impression: ELLE SECONDARY TO NSAIA USE AND SEPSIS. Renal function Stable slow improvement, Electrolytes will be replaced.  Anemia Stable  Edema.     Continue with the current diuretics today watch electrolytes, okay to be discharged from renal point a few any time.   TO REHAB SOON. WE CAN F[facility] AFTER DISCHARGE.       Zara Wilde MD  11/20/17  5:18 PM

## 2017-11-20 NOTE — PLAN OF CARE
Problem: Patient Care Overview (Adult)  Goal: Plan of Care Review  Outcome: Ongoing (interventions implemented as appropriate)    Problem: Sepsis (Adult)  Goal: Signs and Symptoms of Listed Potential Problems Will be Absent or Manageable (Sepsis)  Outcome: Ongoing (interventions implemented as appropriate)    Problem: Pressure Ulcer Risk (Tal Scale) (Adult,Obstetrics,Pediatric)  Goal: Skin Integrity  Outcome: Ongoing (interventions implemented as appropriate)    Problem: Fall Risk (Adult)  Goal: Identify Related Risk Factors and Signs and Symptoms  Outcome: Ongoing (interventions implemented as appropriate)  Goal: Absence of Falls  Outcome: Ongoing (interventions implemented as appropriate)

## 2017-11-20 NOTE — THERAPY TREATMENT NOTE
Acute Care - Physical Therapy Treatment Note  [facility]     Patient Name: Jaclyn Garcia  : 1934  MRN: [id number]  Today's Date: 2017  Onset of Illness/Injury or Date of Surgery Date: 17  Date of Referral to PT: 11/10/17  Referring Physician: MD Romelia    Admit Date: 2017    Visit Dx:    ICD-10-CM ICD-9-CM   1. Sepsis due to Escherichia coli A41.51 038.42     995.91   2. Acute renal failure, unspecified acute renal failure type N17.9 584.9   3. Impaired functional mobility, balance, gait, and endurance Z74.09 V49.89     Patient Active Problem List   Diagnosis   • Sepsis, probable source is urine   • Acute renal failure (ARF)   • UTI (urinary tract infection)   • Gastroenteritis               Adult Rehabilitation Note       17 0850          Rehab Assessment/Intervention    Discipline physical therapy assistant  -UD      Document Type therapy note (daily note)  -UD      Subjective Information agree to therapy;no complaints  -UD      Patient Effort, Rehab Treatment good  -UD      Symptoms Noted During/After Treatment fatigue  -UD      Precautions/Limitations fall precautions  -UD      Recorded by [UD] Bharti Masters PTA      Vital Signs    O2 Delivery Post Treatment room air  -UD      Pre Patient Position Sitting  -UD      Intra Patient Position Standing  -UD      Post Patient Position Sitting  -UD      Recorded by [UD] Bharti Masters PTA      Pain Assessment    Pain Assessment No/denies pain  -UD      Pain Score 0  -UD      Post Pain Score 0  -UD      Recorded by [UD] Bharti Masters PTA      Cognitive Assessment/Intervention    Orientation Status oriented x 4  -UD      Follows Commands/Answers Questions 100% of the time  -UD      Personal Safety Interventions fall prevention program maintained  -UD      Recorded by [UD] Bharti Masters PTA      Bed Mobility, Assessment/Treatment    Bed Mob, Supine to Sit, [place] not tested   up in chair  -UD      Recorded by [UD] Bharti Masters PTA       Transfer Assessment/Treatment    Transfers, Sit-Stand [place] contact guard assist;2 person assist required  -UD      Transfers, Stand-Sit [place] contact guard assist;2 person assist required  -UD      Transfers, Sit-Stand-Sit, Assist Device rolling walker  -UD      Recorded by [UD] Bharti Masters PTA      Gait Assessment/Treatment    Gait, [place] Level contact guard assist;2 person assist required  -UD      Gait, Assistive Device rolling walker  -UD      Gait, Distance (Feet) 200  -UD      Gait, Gait Deviations step length decreased  -UD      Gait, Safety Issues step length decreased  -UD      Gait, Impairments strength decreased  -UD      Recorded by [UD] Bharti Masters PTA      Therapy Exercises    Bilateral Lower Extremities AROM:;10 reps;sitting  -UD      Bilateral Upper Extremity AROM:;10 reps;sitting  -UD      Recorded by [UD] Bharti Masters PTA      Positioning and Restraints    Pre-Treatment Position sitting in chair/recliner  -UD      Post Treatment Position chair  -UD      In Chair notified nsg;reclined;sitting;call light within reach;with family/caregiver;legs elevated  -UD      Recorded by [UD] Bharti Masters PTA        User Key  (r) = Recorded By, (t) = Taken By, (c) = Cosigned By    Initials Name Effective Dates    UD Bharti Masters PTA 06/22/15 -                 IP PT Goals       11/20/17 1002 11/16/17 1407 11/15/17 0923    Bed Mobility PT LTG    Bed Mobility PT LTG, Outcome goal ongoing  -UD goal ongoing  -UD goal ongoing  -LS    Transfer Training PT LTG    Transfer Training PT LTG, Outcome goal ongoing  -UD goal ongoing  -UD goal ongoing  -LS    Gait Training PT LTG    Gait Training Goal PT LTG, Outcome goal partially met  -UD goal ongoing  -UD goal ongoing  -LS    Stair Training PT LTG    Stair Training Goal PT LTG, Outcome goal ongoing  -UD goal ongoing  -UD goal ongoing  -LS      11/14/17 1213 11/13/17 1006       Bed Mobility PT LTG    Bed Mobility PT LTG, Date Established   11/13/17  -LS     Bed Mobility PT LTG, Time to Achieve  2 wks  -LS     Bed Mobility PT LTG, Activity Type  supine to sit/sit to supine  -LS     Bed Mobility PT LTG, [place] Level  supervision required  -LS     Bed Mobility PT LTG, Outcome goal ongoing  -LS      Transfer Training PT LTG    Transfer Training PT LTG, Date Established  11/13/17  -LS     Transfer Training PT LTG, Time to Achieve  2 wks  -LS     Transfer Training PT LTG, Activity Type  sit to stand/stand to sit  -LS     Transfer Training PT LTG, [place] Level  supervision required  -LS     Transfer Training PT LTG, Assist Device  walker, rolling  -LS     Transfer Training PT LTG, Outcome goal ongoing  -LS      Gait Training PT LTG    Gait Training Goal PT LTG, Date Established  11/13/17  -LS     Gait Training Goal PT LTG, Time to Achieve  2 wks  -LS     Gait Training Goal PT LTG, [place] Level  supervision required  -LS     Gait Training Goal PT LTG, Assist Device  walker, rolling  -LS     Gait Training Goal PT LTG, Distance to Achieve  200  -LS     Gait Training Goal PT LTG, Outcome goal ongoing  -LS      Stair Training PT LTG    Stair Training Goal PT LTG, Date Established  11/13/17  -LS     Stair Training Goal PT LTG, Time to Achieve  2 wks  -LS     Stair Training Goal PT LTG, Number of Steps  2  -LS     Stair Training Goal PT LTG, [place] Level  contact guard assist  -LS     Stair Training Goal PT LTG, Outcome goal ongoing  -LS        User Key  (r) = Recorded By, (t) = Taken By, (c) = Cosigned By    Initials Name Provider Type    VIELKA Masters, PTA Physical Therapy Assistant    WILMER Xiao, PT Physical Therapist          Physical Therapy Education     Title: PT OT SLP Therapies (Active)     Topic: Physical Therapy (Active)     Point: Mobility training (Active)    Learning Progress Summary    Learner Readiness Method Response Comment Documented by Status   Patient Eager CHIQUI CHÁVEZ,LUKAS  UD 11/20/17 1002 Done    Acceptance CHIQUI CHÁVEZ,NR    11/16/17 1407 Done    Acceptance E,D NR Discussed at length benefit of progressing mobility to promote PLOF. Needs encouragement.  11/15/17 0922 Active    Acceptance E VU,NR   11/14/17 2348 Done    Acceptance E,D NR   11/14/17 1213 Active    Acceptance E VU,NR   11/14/17 0414 Done    Acceptance E,D NR   11/13/17 1005 Active   Family Eager E,D DU,NR   11/20/17 1002 Done    Acceptance E,D DU,NR   11/16/17 1407 Done    Acceptance E VU,NR   11/14/17 2348 Done    Acceptance E VU,NR   11/14/17 0414 Done    Acceptance E,D NR   11/13/17 1005 Active   Significant Other Acceptance E,D NR   11/14/17 1213 Active               Point: Home exercise program (Active)    Learning Progress Summary    Learner Readiness Method Response Comment Documented by Status   Patient Eager E,D DU,NR   11/20/17 1002 Done    Acceptance E,D DU,NR   11/16/17 1407 Done    Acceptance E,D NR Discussed at length benefit of progressing mobility to promote PLOF. Needs encouragement.  11/15/17 0922 Active    Acceptance E VU,NR   11/14/17 2348 Done    Acceptance E,D NR   11/14/17 1213 Active    Acceptance E VU,NR   11/14/17 0414 Done   Family Eager E,D DU,NR   11/20/17 1002 Done    Acceptance E,D DU,NR   11/16/17 1407 Done    Acceptance E VU,NR   11/14/17 2348 Done    Acceptance E VU,NR   11/14/17 0414 Done   Significant Other Acceptance E,D NR   11/14/17 1213 Active               Point: Body mechanics (Active)    Learning Progress Summary    Learner Readiness Method Response Comment Documented by Status   Patient Eager E,D DU,NR   11/20/17 1002 Done    Acceptance E,D DU,NR   11/16/17 1407 Done    Acceptance E,D NR Discussed at length benefit of progressing mobility to promote PLOF. Needs encouragement.  11/15/17 0922 Active    Acceptance E VU,NR   11/14/17 2348 Done    Acceptance E,D NR   11/14/17 1213 Active    Acceptance E VU,NR   11/14/17 0414 Done    Acceptance E,D NR  LS 11/13/17 1005 Active    Family Eager E,D DU,NR   11/20/17 1002 Done    Acceptance E,D DU,NR   11/16/17 1407 Done    Acceptance E VU,NR   11/14/17 2348 Done    Acceptance E VU,NR   11/14/17 0414 Done    Acceptance E,D NR   11/13/17 1005 Active   Significant Other Acceptance E,D NR   11/14/17 1213 Active               Point: Precautions (Active)    Learning Progress Summary    Learner Readiness Method Response Comment Documented by Status   Patient Eager E,D DU,NR   11/20/17 1002 Done    Acceptance E,D DU,NR   11/16/17 1407 Done    Acceptance E,D NR Discussed at length benefit of progressing mobility to promote PLOF. Needs encouragement.  11/15/17 0922 Active    Acceptance E VU,NR   11/14/17 2348 Done    Acceptance E,D NR   11/14/17 1213 Active    Acceptance E VU,NR   11/14/17 0414 Done    Acceptance E,D NR   11/13/17 1005 Active   Family Eager E,D DU,NR   11/20/17 1002 Done    Acceptance E,D DU,NR   11/16/17 1407 Done    Acceptance E VU,NR   11/14/17 2348 Done    Acceptance E VU,NR   11/14/17 0414 Done    Acceptance E,D NR   11/13/17 1005 Active   Significant Other Acceptance E,D NR   11/14/17 1213 Active                      User Key     Initials Effective Dates Name Provider Type Discipline     06/22/15 -  Bharti Masters, PTA Physical Therapy Assistant PT     06/19/15 -  Buffy Xiao, PT Physical Therapist PT     07/03/17 -  Claudia Dietrich, RN Registered Nurse Nurse                    PT Recommendation and Plan  Anticipated Discharge Disposition: skilled nursing facility  PT Frequency: daily  Plan of Care Review  Plan Of Care Reviewed With: patient  Progress: improving  Outcome Summary/Follow up Plan: pt much improved.up in chair.ambulat 200 ft with 1 standing rest..went over all exercises          Outcome Measures       11/20/17 0850          How much help from another person do you currently need...    Turning from your back to your side while in flat bed without using bedrails? 3  -       Moving from lying on back to sitting on the side of a flat bed without bedrails? 3  -UD      Moving to and from a bed to a chair (including a wheelchair)? 3  -UD      Standing up from a chair using your arms (e.g., wheelchair, bedside chair)? 3  -UD      Climbing 3-5 steps with a railing? 2  -UD      To walk in hospital room? 3  -UD      AM-PAC 6 Clicks Score 17  -UD        User Key  (r) = Recorded By, (t) = Taken By, (c) = Cosigned By    Initials Name Provider Type    VIELKA Masters PTA Physical Therapy Assistant           Time Calculation:         PT Charges       11/20/17 1004          Time Calculation    PT Received On 11/20/17  -UD      PT Goal Re-Cert Due Date 11/23/17  -UD      Time Calculation- PT    Total Timed Code Minutes- PT 24 minute(s)  -UD        User Key  (r) = Recorded By, (t) = Taken By, (c) = Cosigned By    Initials Name Provider Type    VIELKA Masters PTA Physical Therapy Assistant          Therapy Charges for Today     Code Description Service Date Service Provider Modifiers Qty    64787143782 HC PT THER PROC EA 15 MIN 11/20/2017 Bharti Masters PTA GP 1    19385814954 HC GAIT TRAINING EA 15 MIN 11/20/2017 Bharti Masters PTA GP 1    22414012832 HC PT THER SUPP EA 15 MIN 11/20/2017 Bharti Masters PTA GP 1          PT G-Codes  Outcome Measure Options: AM-PAC 6 Clicks Basic Mobility (PT)    Bharti Masters PTA  11/20/2017

## 2017-11-20 NOTE — PLAN OF CARE
Problem: Patient Care Overview (Adult)  Goal: Plan of Care Review  Outcome: Ongoing (interventions implemented as appropriate)    11/20/17 1002   Coping/Psychosocial Response Interventions   Plan Of Care Reviewed With patient   Patient Care Overview   Progress improving   Outcome Evaluation   Outcome Summary/Follow up Plan pt much improved.up in chair.ambulat 200 ft with 1 standing rest..went over all exercises         Problem: Inpatient Physical Therapy  Goal: Bed Mobility Goal LTG- PT  Outcome: Ongoing (interventions implemented as appropriate)    11/13/17 1006 11/20/17 1002   Bed Mobility PT LTG   Bed Mobility PT LTG, Date Established 11/13/17 --    Bed Mobility PT LTG, Time to Achieve 2 wks --    Bed Mobility PT LTG, Activity Type supine to sit/sit to supine --    Bed Mobility PT LTG, [place] Level supervision required --    Bed Mobility PT LTG, Outcome --  goal ongoing       Goal: Transfer Training Goal 1 LTG- PT  Outcome: Ongoing (interventions implemented as appropriate)    11/13/17 1006 11/20/17 1002   Transfer Training PT LTG   Transfer Training PT LTG, Date Established 11/13/17 --    Transfer Training PT LTG, Time to Achieve 2 wks --    Transfer Training PT LTG, Activity Type sit to stand/stand to sit --    Transfer Training PT LTG, [place] Level supervision required --    Transfer Training PT LTG, Assist Device walker, rolling --    Transfer Training PT LTG, Outcome --  goal ongoing       Goal: Gait Training Goal LTG- PT  Outcome: Ongoing (interventions implemented as appropriate)    11/13/17 1006 11/20/17 1002   Gait Training PT LTG   Gait Training Goal PT LTG, Date Established 11/13/17 --    Gait Training Goal PT LTG, Time to Achieve 2 wks --    Gait Training Goal PT LTG, [place] Level supervision required --    Gait Training Goal PT LTG, Assist Device walker, rolling --    Gait Training Goal PT LTG, Distance to Achieve 200 --    Gait Training Goal PT LTG, Outcome --  goal partially met        Goal: Stair Training Goal LTG- PT  Outcome: Ongoing (interventions implemented as appropriate)    11/13/17 1006 11/20/17 1002   Stair Training PT LTG   Stair Training Goal PT LTG, Date Established 11/13/17 --    Stair Training Goal PT LTG, Time to Achieve 2 wks --    Stair Training Goal PT LTG, Number of Steps 2 --    Stair Training Goal PT LTG, [place] Level contact guard assist --    Stair Training Goal PT LTG, Outcome --  goal ongoing

## 2017-11-20 NOTE — PLAN OF CARE
Problem: Fall Risk (Adult)  Goal: Absence of Falls  Outcome: Ongoing (interventions implemented as appropriate)  Patient feels stronger today and is agreeable to walking with staff. Walks well with walker and able to transfer to bathroom with assist x1.

## 2017-11-21 VITALS
OXYGEN SATURATION: 95 % | WEIGHT: 151.6 LBS | DIASTOLIC BLOOD PRESSURE: 84 MMHG | HEIGHT: 63 IN | BODY MASS INDEX: 26.86 KG/M2 | HEART RATE: 96 BPM | RESPIRATION RATE: 16 BRPM | TEMPERATURE: 98.1 F | SYSTOLIC BLOOD PRESSURE: 146 MMHG

## 2017-11-21 LAB
HCT VFR BLD AUTO: 27.1 % (ref 34.5–44)
HGB BLD-MCNC: 8.8 G/DL (ref 11.5–15.5)

## 2017-11-21 PROCEDURE — 85018 HEMOGLOBIN: CPT | Performed by: INTERNAL MEDICINE

## 2017-11-21 PROCEDURE — 25010000002 CEFTRIAXONE PER 250 MG: Performed by: INTERNAL MEDICINE

## 2017-11-21 PROCEDURE — 97116 GAIT TRAINING THERAPY: CPT

## 2017-11-21 PROCEDURE — 85014 HEMATOCRIT: CPT | Performed by: INTERNAL MEDICINE

## 2017-11-21 PROCEDURE — 94799 UNLISTED PULMONARY SVC/PX: CPT

## 2017-11-21 PROCEDURE — 97110 THERAPEUTIC EXERCISES: CPT

## 2017-11-21 PROCEDURE — 25010000002 HEPARIN (PORCINE) PER 1000 UNITS: Performed by: INTERNAL MEDICINE

## 2017-11-21 PROCEDURE — 99239 HOSP IP/OBS DSCHRG MGMT >30: CPT | Performed by: NURSE PRACTITIONER

## 2017-11-21 RX ORDER — ALPRAZOLAM 0.25 MG/1
0.25 TABLET ORAL 3 TIMES DAILY PRN
Start: 2017-11-21 | End: 2017-11-22

## 2017-11-21 RX ORDER — ALBUMIN (HUMAN) 12.5 G/50ML
12.5 SOLUTION INTRAVENOUS AS NEEDED
Status: CANCELLED | OUTPATIENT
Start: 2017-11-22 | End: 2017-11-22

## 2017-11-21 RX ORDER — ACETAMINOPHEN 325 MG/1
650 TABLET ORAL EVERY 4 HOURS PRN
Start: 2017-11-21

## 2017-11-21 RX ORDER — POTASSIUM CHLORIDE 1500 MG/1
20 TABLET, FILM COATED, EXTENDED RELEASE ORAL DAILY
Start: 2017-11-21

## 2017-11-21 RX ADMIN — HEPARIN SODIUM 5000 UNITS: 5000 INJECTION, SOLUTION INTRAVENOUS; SUBCUTANEOUS at 06:05

## 2017-11-21 RX ADMIN — ALPRAZOLAM 0.25 MG: 0.25 TABLET ORAL at 08:33

## 2017-11-21 RX ADMIN — CEFTRIAXONE SODIUM 1 G: 1 INJECTION, SOLUTION INTRAVENOUS at 13:20

## 2017-11-21 RX ADMIN — ALPRAZOLAM 0.25 MG: 0.25 TABLET ORAL at 15:55

## 2017-11-21 RX ADMIN — PANTOPRAZOLE SODIUM 40 MG: 40 TABLET, DELAYED RELEASE ORAL at 08:33

## 2017-11-21 NOTE — PROGRESS NOTES
"   LOS: 13 days    Patient Care Team:  No Known Provider as PCP - General    Reason For Visit:  F/U [facility]  Subjective   No new events patient is doing fine    Review of Systems:   Denies any nausea vomiting, no chest pain or shortness of breath, no dysuria or hematuria.      Objective       atorvastatin 40 mg Oral Nightly   heparin (porcine) 5,000 Units Subcutaneous Q8H   pantoprazole 40 mg Oral BID AC            Vital Signs:  Blood pressure 146/84, pulse 92, temperature 98.1 °F (36.7 °C), resp. rate 16, height 63\" (160 cm), weight 151 lb 9.6 oz (68.8 kg), SpO2 95 %.    Flowsheet Rows         First Filed Value    Admission Height  63\" (160 cm) Documented at 11/08/2017 2018    Admission Weight  166 lb 0.1 oz (75.3 kg) Documented at 11/08/2017 2018 11/20 0701 - 11/21 0700  In: 600 [P.O.:600]  Out: 1900 [Urine:1900]    Physical Exam:    General Appearance:Awake, alert, no obvious distress.  Eyes: PER, EOMI   Lungs: Rhonchi's and Rales are heard at the bases.  Good air entry.  Equal chest movement  Heart/CV: regular rhythm & normal rate, no murmur, no gallop, no rub and trace right edema, 1+ on left edema  Abdomen: not distended, soft, non-tender, no masses,  bowel sounds present  Skin: No rash, Warm and dry  Extremities: Positive edema.  No cyanosis.  Neuro: Grossly intact alert oriented ×3.  No focal deficit.    Radiology:    Labs:    Results from last 7 days  Lab Units 11/21/17  0427 11/20/17  0424 11/19/17  0820  11/18/17  0754  11/17/17  0604 11/15/17  0415   WBC 10*3/mm3  --   --   --   --  14.32*  --  14.57* 10.90*   HEMOGLOBIN g/dL 8.8* 8.7* 9.3*  < > 8.8*  9.0*  < > 5.7* 9.0*   HEMATOCRIT % 27.1* 27.1* 28.0*  < > 26.4*  27.5*  < > 17.9* 27.6*   PLATELETS 10*3/mm3  --   --   --   --  328  --  286 217   < > = values in this interval not displayed.    Results from last 7 days  Lab Units 11/20/17  1041 11/20/17  0424 11/19/17 2025 11/19/17  0820 11/18/17  0417 11/17/17  0401 11/16/17  0427 11/15/17  0415 "   SODIUM mmol/L  --  141  --  142 142 141 140 143   POTASSIUM mmol/L 4.2 3.4* 3.4* 2.8* 3.5 3.8 3.4* 3.5   CHLORIDE mmol/L  --  100  --  101 107 107 106 108   CO2 mmol/L  --  30.0  --  30.0 24.0 25.0 25.0 24.0   BUN mg/dL  --  29*  --  29* 26* 33* 39* 49*   CREATININE mg/dL  --  1.80*  --  1.90* 2.00* 2.20* 2.60* 3.10*   CALCIUM mg/dL  --  8.5*  --  8.9 8.9 8.7 9.0 8.7   PHOSPHORUS mg/dL  --  3.7  --   --  3.8 3.9 3.9 4.2   MAGNESIUM mg/dL  --   --   --   --   --  1.7  --  2.0   ALBUMIN g/dL  --  3.40  --   --  3.20 3.20 3.20 3.30       Results from last 7 days  Lab Units 11/20/17  0424   GLUCOSE mg/dL 109*         Results from last 7 days  Lab Units 11/18/17  0417   ALK PHOS U/L 57   BILIRUBIN mg/dL 1.2   ALT (SGPT) U/L 15   AST (SGOT) U/L 25             Results from last 7 days  Lab Units 11/17/17  0946   COLOR UA  Yellow   CLARITY UA  Clear   PH, URINE  6.5   SPECIFIC GRAVITY, URINE  <=1.005   GLUCOSE UA  Negative   KETONES UA  Negative   BILIRUBIN UA  Negative   PROTEIN UA  Trace*   BLOOD UA  Moderate (2+)*   LEUKOCYTES UA  Small (1+)*   NITRITE UA  Negative            Assessment     Principal Problem:    Sepsis, probable source is urine  Active Problems:    Acute renal failure (ARF)    UTI (urinary tract infection)    Gastroenteritis    Impression: [facility] SECONDARY TO NSAIA USE AND SEPSIS. Renal function Stable slow improvement, Electrolytes will be replaced.  Anemia Stable  Edema.     Continue with the current diuretics today watch electrolytes, okay to be discharged from renal point a few any time.   TO REHAB SOON. WE CAN F/U IN OUR [facility] AFTER DISCHARGE.       Zara Wilde MD  11/21/17  2:24 PM

## 2017-11-21 NOTE — PROGRESS NOTES
Continued Stay Note  CARMINE Paiz     Patient Name: Jaclyn Garcia  MRN: [id number]  Today's Date: 11/21/2017    Admit Date: 11/8/2017          Discharge Plan       11/21/17 1238    Case Management/Social Work Plan    Plan [facility]    Patient/Family In Agreement With Plan yes    Additional Comments Signature [facility] has a short term skilled private room available for Mrs. Garcia today if Medically ready. [facility] insurance has approved. Patient and family agreeable and will transport. Nurse to Call report to [phone]. Sarah will obtain transfer summary from [facility].               Discharge Codes       11/21/17 1243    Discharge Codes    Discharge Codes 03  Discharged/transferred to skilled nursing facility (SNF) with Medicare certification in anticipation of skilled care        Expected Discharge Date and Time     Expected Discharge Date Expected Discharge Time    Nov 21, 2017             Deonna Khan RN

## 2017-11-21 NOTE — DISCHARGE SUMMARY
[facility] Medicine Services  DISCHARGE SUMMARY    Patient Name: Jaclyn Garcia  : 1934  MRN: [id number]    Date of Admission: 2017  Date of Discharge:    Length of Stay: 13  Primary Care Physician: No Known Provider    Consults     Date and Time Order Name Status Description    2017 Inpatient Consult to Nephrology Completed         Hospital Course     Presenting Problem:   ARF (acute renal failure) [N17.9]  Sepsis [A41.9]    Active Hospital Problems (** Indicates Principal Problem)    Diagnosis Date Noted   • **Sepsis, probable source is urine [A41.9] 2017   • UTI (urinary tract infection) [N39.0] 2017   • Gastroenteritis [K52.9] 2017   • Acute renal failure (ARF) [N17.9] 2017      Resolved Hospital Problems    Diagnosis Date Noted Date Resolved   No resolved problems to display.          Hospital Course:  Jaclyn Garcia is a 83 y.o. female  with past medical history significant for hypertension, hyperlipidemia, and GERD who was brought to the emergency room in [place] due to watery diarrhea, weakness, and decreased oral intake in the setting of NSAID and diuretic/hypertension medication usage.  She was found to have Escherichia coli UTI and was felt to be in septic shock.  She was admitted to their intensive care unit and given IV fluids, was placed on BiPAP and started on dobutamine and Bumex drip.  She was transferred to [facility] ICU for higher level of care.  Her serum creatinine peaked at 4.7.  Nephrology was consulted and followed along.  She received diuretics and treatment for her UTI.  Creatinine continues to decline.  Currently 1.8. She had an echocardiogram that showed normal LVEF.  She was transferred to the telemetry floor on 11/15/17.  Her sepsis resolved and she has completed 10 days of Rocephin.  She did receive 2 units of blood due to anemia.  There is no source of bleeding found.  Hemoglobin and hematocrit  have remained stable since.  She has been undergoing physical therapy who recommends rehabilitation.  She will be transferred to [facility] for further rehabilitation.  She's been hypokalemic and has received potassium replacement.  Recommend daily potassium replacement.  Needs repeat BMP in 2-3 days.  She is to [facility] with [facility] in 2 weeks.  This can be done at the [facility].           Day of Discharge     HPI:   Feels good, no complaints, ready for rehab    Review of Systems  Gen- No fevers, chills  CV- No chest pain, palpitations  Resp- No cough, dyspnea  GI- No N/V/D, abd pain      Otherwise ROS is negative except as mentioned in the HPI.    Vital Signs:   Temp:  [98 °F (36.7 °C)-98.1 °F (36.7 °C)] 98.1 °F (36.7 °C)  Heart Rate:  [85-98] 92  Resp:  [14-18] 16  BP: (137-148)/(74-84) 146/84     Physical Exam:  Gen-no acute distress, sitting up in chair  CV-RRR, S1 S2 normal, no m/r/g  Resp-CTAB, no wheezes  Abd-soft, NT, ND, +BS  Ext-no edema  Neuro-A&Ox3, no focal deficits  Psych-appropriate mood      Pertinent  and/or Most Recent Results         Results from last 7 days  Lab Units 11/21/17  0427 11/20/17  1041 11/20/17  0424 11/19/17  2025 11/19/17  0820 11/19/17  0023 11/18/17  1526 11/18/17  0754 11/18/17  0417 11/17/17  2358  11/17/17  0604 11/17/17  0401 11/16/17  0427 11/15/17  0415   WBC 10*3/mm3  --   --   --   --   --   --   --  14.32*  --   --   --  14.57*  --   --  10.90*   HEMOGLOBIN g/dL 8.8*  --  8.7*  --  9.3* 9.1* 8.7* 8.8*  9.0*  --  8.8*  < > 5.7*  --   --  9.0*   HEMATOCRIT % 27.1*  --  27.1*  --  28.0* 27.2* 25.8* 26.4*  27.5*  --  26.9*  < > 17.9*  --   --  27.6*   PLATELETS 10*3/mm3  --   --   --   --   --   --   --  328  --   --   --  286  --   --  217   SODIUM mmol/L  --   --  141  --  142  --   --   --  142  --   --   --  141 140 143   POTASSIUM mmol/L  --  4.2 3.4* 3.4* 2.8*  --   --   --  3.5  --   --   --  3.8 3.4* 3.5   CHLORIDE mmol/L  --   --  100   --  101  --   --   --  107  --   --   --  107 106 108   CO2 mmol/L  --   --  30.0  --  30.0  --   --   --  24.0  --   --   --  25.0 25.0 24.0   BUN mg/dL  --   --  29*  --  29*  --   --   --  26*  --   --   --  33* 39* 49*   CREATININE mg/dL  --   --  1.80*  --  1.90*  --   --   --  2.00*  --   --   --  2.20* 2.60* 3.10*   GLUCOSE mg/dL  --   --  109*  --  115*  --   --   --  96  --   --   --  114* 128* 106*   CALCIUM mg/dL  --   --  8.5*  --  8.9  --   --   --  8.9  --   --   --  8.7 9.0 8.7   < > = values in this interval not displayed.    Results from last 7 days  Lab Units 11/18/17  0417 11/17/17  1443   BILIRUBIN mg/dL 1.2  --    ALK PHOS U/L 57  --    ALT (SGPT) U/L 15  --    AST (SGOT) U/L 25  --    PROTIME Seconds  --  13.3*   INR   --  1.21         Results from last 7 days  Lab Units 11/17/17  0604   BNP pg/mL 90.0     Brief Urine Lab Results  (Last result in the past 365 days)      Color   Clarity   Blood   Leuk Est   Nitrite   Protein   CREAT   Urine HCG        11/17/17 0946 Yellow Clear Moderate (2+)(A) Small (1+)(A) Negative Trace(A)               Microbiology Results Abnormal     Procedure Component Value - Date/Time    Urine Culture - Urine, Urine, Clean Catch [[id number]]  (Normal) Collected:  11/17/17 0946    Lab Status:  Final result Specimen:  Urine from Urine, Clean Catch Updated:  11/19/17 0743     Urine Culture No growth at 2 days    Blood Culture - Blood, [[id number]]  (Normal) Collected:  11/08/17 2049    Lab Status:  Final result Specimen:  Blood from Hand, Left Updated:  11/13/17 2216     Blood Culture No growth at 5 days    Blood Culture - Blood, [[id number]]  (Normal) Collected:  11/08/17 2055    Lab Status:  Final result Specimen:  Blood from Hand, Right Updated:  11/13/17 2216     Blood Culture No growth at 5 days    Eosinophil Smear - Urine, Urine, Clean Catch [[id number]]  (Normal) Collected:  11/11/17 1030    Lab Status:  Final result Specimen:  Urine from Urine, Clean Catch Updated:   11/11/17 1547     Eosinophil Smear 0 % EOS/100 Cells     Narrative:       No eosinophil seen    Urine Culture - Urine, Urine, Catheter [[id number]]  (Abnormal)  (Susceptibility) Collected:  11/08/17 2131    Lab Status:  Final result Specimen:  Urine from Urine, Catheter Updated:  11/11/17 1157     Urine Culture --      10,000-20,000 CFU/mL Escherichia coli (A)    Susceptibility      Escherichia coli     CHERYL     Ampicillin <=8 ug/ml Susceptible     Ampicillin + Sulbactam <=8/4 ug/ml Susceptible     Aztreonam <=8 ug/ml Susceptible     Cefepime <=8 ug/ml Susceptible     Cefotaxime <=2 ug/ml Susceptible     Ceftriaxone <=8 ug/ml Susceptible     Cefuroxime sodium <=4 ug/ml Susceptible     Cephalothin <=8 ug/ml Susceptible     Ertapenem <=1 ug/ml Susceptible     Gentamicin <=4 ug/ml Susceptible     Levofloxacin <=2 ug/ml Susceptible     Meropenem <=1 ug/ml Susceptible     Nitrofurantoin <=32 ug/ml Susceptible     Piperacillin + Tazobactam <=16 ug/ml Susceptible     Tetracycline <=4 ug/ml Susceptible     Tobramycin <=4 ug/ml Susceptible     Trimethoprim + Sulfamethoxazole <=2/38 ug/ml Susceptible                    Clostridium Difficile Toxin - Stool, Per Rectum [[id number]] Collected:  11/09/17 0701    Lab Status:  Final result Specimen:  Stool from Per Rectum Updated:  11/09/17 0922    Narrative:       The following orders were created for panel order Clostridium Difficile Toxin - Stool, Per Rectum.  Procedure                               Abnormality         Status                     ---------                               -----------         ------                     Clostridium Difficile To...[[id number]]  Normal              Final result                 Please view results for these tests on the individual orders.    Clostridium Difficile Toxin, PCR - Stool, Per Rectum [[id number]]  (Normal) Collected:  11/09/17 0701    Lab Status:  Final result Specimen:  Stool from Per Rectum Updated:  11/09/17 0922     C. Difficile  Toxins by PCR Not Detected    Narrative:         Performance characteristics of test not established for patients <2 years of age.  Negative for Toxigenic C. Difficile          Imaging Results (all)     Procedure Component Value Units Date/Time    XR Chest 1 View [[id number]] Collected:  11/09/17 0747     Updated:  11/09/17 1054    Narrative:       EXAMINATION: XR CHEST 1 VW- 11/08/2017     INDICATION: transfer with [facility]      COMPARISON: NONE     FINDINGS: Cardiac size enlarged. Bibasilar opacifications greatest on  the left without focal confluent opacification identified. The pulmonary  vascularity demonstrates increased interstitial prominence of  interstitial edema. Trace bilateral pleural effusions. No pneumothorax.       Impression:       Cardiomegaly with bibasilar opacifications greatest on the  left which may represent atelectasis and/or early airspace disease  however no confluent opacification is identified. Trace bilateral  pleural effusions and interstitial edema pattern.     D:  11/09/2017  E:  11/09/2017     This report was finalized on 11/9/2017 10:52 AM by Dr. Trav Casey.       XR Chest 1 View [[id number]] Collected:  11/09/17 1432     Updated:  11/09/17 1822    Narrative:          EXAMINATION: XR CHEST 1 VW - 11/09/2017     INDICATION: Line placement.     COMPARISON: None.     FINDINGS: Interval placement of left internal jugular central venous  catheter which curves and terminates within the right brachiocephalic  vein. Cardiomediastinal contour enlarged and unchanged with trace  pulmonary vascular congestion and bibasilar opacifications greatest on  the left. No pneumothorax and probable trace left pleural effusion  similar to prior.       Impression:       Left internal jugular approach central venous catheter  terminating right brachiocephalic vein. No postprocedural pneumothorax  is identified with stable pulmonary findings.     DICTATED:     11/09/2017  EDITED:         11/09/2017        This  report was finalized on 11/9/2017 6:20 PM by Dr. Trav Casey.       US Renal Bilateral [[id number]] Collected:  11/10/17 0857     Updated:  11/10/17 1328    Narrative:       EXAMINATION: US RENAL BILATERAL-     INDICATION: A41.51-Sepsis due to Escherichia coli (E. coli); N17.9-Acute  kidney failure, unspecified.      TECHNIQUE: Ultrasound of the kidneys was performed in the longitudinal  and transverse planes.     COMPARISON: None.     FINDINGS: The right kidney measures 11.2 cm in length. The left kidney  is smaller measuring 9.8 cm in length. There is no renal mass, stone or  obstruction.          Impression:       Right and left kidneys measure 11.2 and 9.8 cm in length  respectively without evidence of mass, stone or obstruction.         D:  11/10/2017  E:  11/10/2017     This report was finalized on 11/10/2017 1:26 PM by Dr. Adin Mendez MD.       XR Chest 1 View [[id number]] Collected:  11/11/17 1507     Updated:  11/11/17 1541    Narrative:          EXAMINATION: XR CHEST 1 VW - 11/11/2017      INDICATION:  A41.51-Sepsis due to Escherichia coli (e. coli);  N17.9-Acute kidney failure, unspecified.     COMPARISON: 11/09/2017.     FINDINGS: There is bibasilar airspace disease and probable effusions,  left greater than right. Central venous catheter is in satisfactory  position. The heart is at the upper limits of normal.           Impression:       Bibasilar airspace disease and probable effusions, left  greater than right; insignificantly changed since 11/09/2017.     DICTATED:     11/11/2017  EDITED:         11/11/2017           This report was finalized on 11/11/2017 3:39 PM by Dr. Adin Mendez MD.       CT Abdomen Pelvis Without Contrast [[id number]] Collected:  11/12/17 1611     Updated:  11/13/17 0851    Narrative:       EXAMINATION: CT CHEST WO CONTRAST, CT ABDOMEN PELVIS WO CONTRAST -  11/12/2017     INDICATION: A41.51-Sepsis due to Escherichia coli (e. coli); N17.9-Acute  kidney failure, unspecified.  Abdominal distention.     TECHNIQUE: Multiple axial CT imaging is obtained of the chest,  abdomen  and pelvis without the administration of intravenous contrast.     The radiation dose reduction device was turned on for each scan per the  ALARA (As Low as Reasonably Achievable) protocol.     COMPARISON: None.     FINDINGS:      CHEST: Small bilateral pleural effusions with consolidation identified  at the lung bases bilaterally for which infiltrate cannot be excluded.  There is patchy ground glass opacity seen in the aerated portions of the  upper lung fields in which edema, atelectatic changes or infiltrate  cannot be excluded. Mild enlargement of the cardiac chambers. No  pericardial effusion. Vascular calcification is seen within the coronary  vessels. There is no bulky hilar or axillary adenopathy. Degenerative  change is identified within the spine.     ABDOMEN: Liver is homogeneous in appearance. The spleen is unremarkable.  There is anasarca seen throughout the subcutaneous tissues. There is a  large hiatal hernia. There is distention of the stomach with air.  Kidneys and adrenal glands are grossly unremarkable in appearance. The  pancreas is homogeneous. No abdominal or retroperitoneal  lymphadenopathy. No free fluid or free air. There is gaseous distention  of the colon. There is no significant dilatation identified of the small  bowel. There is no evidence of a cecal volvulus. The cecum is seen  within the right lower quadrant. There is fluid filling the rectal vault  with some decompression identified of the distal descending colon and  sigmoid colon. Change in caliber of the bowel is seen near the splenic  flexure however there is no evidence of an obstructing lesion.     PELVIS: The pelvic organs are unremarkable. Pelvic portion of the  gastrointestinal tract reveals fluid in the rectal vault. The small  bowel is grossly unremarkable in appearance. There is decompression of  the sigmoid colon and distal  descending colon. Extensive anasarca is  seen within the subcutaneous tissues. No free fluid or free air. No  pelvic adenopathy. No abnormal mass or fluid collections identified.  Reynolds catheter balloon is seen in the bladder and pelvis.        Impression:       Bilateral pleural effusions with consolidation at the lung  bases bilaterally. There is ground glass opacity seen throughout the  aerated portion of lung fields. Findings may suggest diffuse edema or  pneumonia. Anasarca seen within the subcutaneous tissues throughout the  chest, abdomen and pelvis. There is gaseous distention of the colon with  change in caliber seen at the splenic flexure with decompression of the  distal descending colon and sigmoid colon. No definite obstructing  lesion is identified. Gaseous distention of the stomach. No obvious  obstruction.     DICTATED:     11/12/2017  EDITED:         11/12/2017        This report was finalized on 11/13/2017 8:49 AM by Dr. Gabriela Gonzales MD.       CT Chest Without Contrast [[id number]] Collected:  11/12/17 1611     Updated:  11/13/17 0851    Narrative:       EXAMINATION: CT CHEST WO CONTRAST, CT ABDOMEN PELVIS WO CONTRAST -  11/12/2017     INDICATION: A41.51-Sepsis due to Escherichia coli (e. coli); N17.9-Acute  kidney failure, unspecified. Abdominal distention.     TECHNIQUE: Multiple axial CT imaging is obtained of the chest,  abdomen  and pelvis without the administration of intravenous contrast.     The radiation dose reduction device was turned on for each scan per the  ALARA (As Low as Reasonably Achievable) protocol.     COMPARISON: None.     FINDINGS:      CHEST: Small bilateral pleural effusions with consolidation identified  at the lung bases bilaterally for which infiltrate cannot be excluded.  There is patchy ground glass opacity seen in the aerated portions of the  upper lung fields in which edema, atelectatic changes or infiltrate  cannot be excluded. Mild enlargement of the cardiac  chambers. No  pericardial effusion. Vascular calcification is seen within the coronary  vessels. There is no bulky hilar or axillary adenopathy. Degenerative  change is identified within the spine.     ABDOMEN: Liver is homogeneous in appearance. The spleen is unremarkable.  There is anasarca seen throughout the subcutaneous tissues. There is a  large hiatal hernia. There is distention of the stomach with air.  Kidneys and adrenal glands are grossly unremarkable in appearance. The  pancreas is homogeneous. No abdominal or retroperitoneal  lymphadenopathy. No free fluid or free air. There is gaseous distention  of the colon. There is no significant dilatation identified of the small  bowel. There is no evidence of a cecal volvulus. The cecum is seen  within the right lower quadrant. There is fluid filling the rectal vault  with some decompression identified of the distal descending colon and  sigmoid colon. Change in caliber of the bowel is seen near the splenic  flexure however there is no evidence of an obstructing lesion.     PELVIS: The pelvic organs are unremarkable. Pelvic portion of the  gastrointestinal tract reveals fluid in the rectal vault. The small  bowel is grossly unremarkable in appearance. There is decompression of  the sigmoid colon and distal descending colon. Extensive anasarca is  seen within the subcutaneous tissues. No free fluid or free air. No  pelvic adenopathy. No abnormal mass or fluid collections identified.  Reynolds catheter balloon is seen in the bladder and pelvis.        Impression:       Bilateral pleural effusions with consolidation at the lung  bases bilaterally. There is ground glass opacity seen throughout the  aerated portion of lung fields. Findings may suggest diffuse edema or  pneumonia. Anasarca seen within the subcutaneous tissues throughout the  chest, abdomen and pelvis. There is gaseous distention of the colon with  change in caliber seen at the splenic flexure with  decompression of the  distal descending colon and sigmoid colon. No definite obstructing  lesion is identified. Gaseous distention of the stomach. No obvious  obstruction.     DICTATED:     11/12/2017  EDITED:         11/12/2017        This report was finalized on 11/13/2017 8:49 AM by Dr. Gabriela Gonzales MD.       XR Chest 1 View [[id number]] Collected:  11/13/17 0856     Updated:  11/13/17 0903    Narrative:       EXAMINATION: XR CHEST 1 VW-      INDICATION: Pleural effusions; A41.51-Sepsis due to Escherichia coli (E.  coli); N17.9-Acute kidney failure, unspecified.      COMPARISON: 11/11/2017.     FINDINGS: Portable chest reveals low lung volumes. Small bilateral  pleural effusions with ill-defined opacification lung bases. The heart  is borderline large. Deep line catheter unchanged on the left.           Impression:       Stable chest as above.     D:  11/13/2017  E:  11/13/2017     This report was finalized on 11/13/2017 9:00 AM by Dr. Gabriela Gonzales MD.       XR Chest 1 View [[id number]] Collected:  11/14/17 0934     Updated:  11/14/17 1220    Narrative:       EXAMINATION: XR CHEST 1 VW- 11/14/2017     INDICATION: persistent wheeze, possible pneumonia; A41.51-Sepsis due to  Escherichia coli (e. coli); N17.9-Acute kidney failure, unspecified;  Z74.09-Other reduced mobility      COMPARISON: Chest x-ray 11/13/2017     FINDINGS: Cardiomediastinal contour enlarged with slight decrease in  central pulmonary vascularity and bibasilar atelectasis from prior  however persistent low lung volumes and bilateral pleural effusions  exist. No new focal consolidation. No pneumothorax. Left internal  jugular catheter remains terminating at the junction of the  brachiocephalic.           Impression:       Decreased bibasilar opacifications consistent with improved  atelectasis. Persistent low lung volumes with bilateral effusions  greatest on the left which is likely moderate volume.     D:  11/14/2017  E:  11/14/2017      This report was finalized on 11/14/2017 12:18 PM by Dr. Trav Casey.       XR Abdomen KUB [[id number]] Collected:  11/16/17 1754     Updated:  11/16/17 1839    Narrative:          EXAMINATION: XR ABDOMEN KUB - 11/16/2017     INDICATION:  A41.51-Sepsis due to Escherichia coli (e. coli);  N17.9-Acute kidney failure, unspecified; Z74.09-Other reduced mobility.      COMPARISON: None.     FINDINGS:   1. Diffuse gaseous distention of the bowel is noted involving both large  and small bowel diffusely.     2. Transition zone or mass is not identified.     3. Abnormal calcifications are not identified. The pelvis is not  included on the image.     4. There is airspace opacity left lung base with pleural effusion in the  left lower chest.       Impression:       Mild distention of the bowel diffusely involving large and  small bowel globally with no evidence of mass, transition zone or  high-grade obstruction. Pneumatosis is not identified and no other acute  conventional image findings are identified.     DICTATED:     11/16/2017  EDITED:          11/16/2017        This report was finalized on 11/16/2017 6:37 PM by Dr. Cheikh Marks MD.       XR Chest 1 View [[id number]] Collected:  11/19/17 1410     Updated:  11/19/17 2156    Narrative:          EXAMINATION: XR CHEST 1 VW - 11/19/2017     INDICATION: A41.51-Sepsis due to Escherichia coli (e. coli); N17.9-Acute  kidney failure, unspecified; Z74.09-Other reduced mobility.      COMPARISON: 11/14/2017.     FINDINGS: Left IJ catheter remains in the distal left brachiocephalic  vein or proximal SVC. Heart is normal in size. Vasculature is upper  limits of normal. There is improving left basilar atelectasis plus/minus  effusion. Lungs appear clear elsewhere. Increased upper abdominal bowel  gas may represent low-level ileus.           Impression:       Improving aeration of the left lung base. No new chest  disease is seen.     DICTATED:     11/19/2017  EDITED:           11/19/2017        This report was finalized on 11/19/2017 9:54 PM by DR. Rolly Stephen MD.             Results for orders placed during the hospital encounter of 11/08/17   Adult Transthoracic Echo Complete W/ Cont if Necessary Per Protocol    Narrative · Left atrial cavity size is borderline dilated.  · Mild to moderate tricuspid valve regurgitation is present.  · Calculated right ventricular systolic pressure from tricuspid   regurgitation is 40 mmHg.  · Mild aortic valve regurgitation is present.  · Mild mitral valve regurgitation is present.  · Left ventricular systolic function is normal. Estimated EF = 65%.  · There is no evidence of pericardial effusion.  · Mild pulmonary hypertension is present.  · The aortic valve exhibits sclerosis.  · Normal right ventricular cavity size, wall thickness, systolic function   and septal motion noted.  · Left ventricular diastolic function is normal.            Discharge Details      Jaclyn Garcia   Home Medication Instructions SADE:[id number]    Printed on:11/21/17 2513   Medication Information                      acetaminophen (TYLENOL) 325 MG tablet  Take 2 tablets by mouth Every 4 (Four) Hours As Needed for Headache or Fever (fever greater than 101.5 F).             ALPRAZolam (XANAX) 0.25 MG tablet  Take 1 tablet by mouth 3 (Three) Times a Day As Needed for Anxiety              atorvastatin (LIPITOR) 40 MG tablet  Take 40 mg by mouth Every Night.             gemfibrozil (LOPID) 600 MG tablet  Take 600 mg by mouth 2 (Two) Times a Day.             potassium chloride (K-TAB) 20 MEQ tablet controlled-release ER tablet  Take 1 tablet by mouth Daily.             raNITIdine (ZANTAC) 150 MG tablet  Take 150 mg by mouth 2 (Two) Times a Day.                   Discharge Disposition:  [facility]    Discharge Diet: as tolerated    Discharge Activity: as tolerated      No future appointments.    Additional Instructions for the Follow-ups that You Need to Schedule      Discharge Follow-up with PCP    As directed    Follow Up Details:  PCP in 1 week from discharge from SNF       Discharge Follow-up with Specialty: [facility] (can be see at [facility]) in 2 weeks    As directed    Specialty:  [facility] (can be see at [facility]) in 2 weeks                 Time Spent on Discharge: 45 minutes    ZULY Stafford  11/21/17  2:52 PM

## 2017-11-21 NOTE — DISCHARGE PLACEMENT REQUEST
"Need short term skilled bed    Haydee Khan, RN    [phone]    Blake Davenport (83 y.o. Female)     Date of Birth Social Security Number Address Home Phone MRN    1934  [id number] [facility] [phone] [id number]    Mandaen Marital Status          Orthodoxy        Admission Date Admission Type Admitting Provider Attending Provider Department, Room/Bed    11/8/17 Elective Tory Martínez MD Opii, [place], MD [facility] 4H, S479/1    Discharge Date Discharge Disposition Discharge Destination                      Attending Provider: Tory Martínez MD     Allergies:  Niacin And Related    Isolation:  None   Infection:  None   Code Status:  Conditional    Ht:  63\" (160 cm)   Wt:  151 lb 9.6 oz (68.8 kg)    Admission Cmt:  None   Principal Problem:  Sepsis, probable source is urine [A41.9]                 Active Insurance as of 11/8/2017     Primary Coverage     Payor Plan Insurance Group Employer/Plan Group    [facility] MEDIC[facility] REPLAC[facility] [id number]     Payor Plan Address Payor Plan Phone Number Effective From Effective To    [address] [address]  6/1/2017     [place] [address]       Subscriber Name Subscriber Birth Date Member ID       BLAKE DAVENPORT 1934 [id number]                 Emergency Contacts      (Rel.) Home Phone Work Phone Mobile Phone    Ralph Davenport (Spouse) [phone] -- [phone]    Gus Davenport (Son) -- -- [phone]            Insurance Information                [employer] MEDICARE REPLACEMENT/[employer] Phone:     Subscriber: Blake Davenport Subscriber#: [id number]    Group#: [id number] Precert#: [id number]             History & Physical      Finesse Yost MD at 11/8/2017  9:04 PM          INTENSIVIST / PULMONARY INITIAL VISIT (CONSULT / H&P) NOTE     Hospital:  LOS: 0 days   Ms. Blake Davenport, 83 y.o. female is followed for:   No chief complaint on " file.    Principal Problem:    Sepsis  Active Problems:    Acute renal failure (ARF)         History of Present Illness   84 y/o WF w/ h/o HTN, HLD, GERD, diuretic use (Triamterene/HCTZ), lifetime non-smoker, who presented to [facility] 11/6 after having several days of profuse watery diarrhea.  She also had weakness, decreased po intake, and continued to take her diuretics for HTN.  She also reported taking aleve but couldn't tell me when.  She was found to have E. Coli UTI and GNR bacteremia.  She was felt to be in septic shock and was treated with pip-tazo.  She also received vancomycin at one point.  She was given IVF, and apparently today had to be transferred to ICU on bipap with dopamine gtt and bumex gtt.  We were called to accept patient for possible CRRT.  Note all history basically comes from the chart which is very sparse, nor did I receive a direct report.  Patient is confused and can give a limited history.    Notable studies performed at [facility] showed:    No fevers during stay, multiple low BP's documented HD#1    Labs:  WBC 14.3, Bicarb 15, k 4.0, BUN 53, Cr 4.7, ABG 7.221/31.7/84 BE -15 on 3 lpm, Cortisol 15.8, Ur Cr 87.3, Ur Prot 289, UA Large blood, neg nit, large LE, > 100 WBC, > 100 RBC    Imaging:  Renal U/S - no hydro, echogenic right kidney 12.0 cm renal cortex c/w intrinsic medical renal disease, right 2.1 cm uncomplicated cyst.  Left kidney normal, 10.7 cm.    CXR: LLL infiltrate, pulmonary edema    Echo:  EF 60-65%, no WMA, Grade 3 Diastolic Dysfunction, PASP 50, valves normal, no effusion    CT Abdomen:  Left lower lobe atelectasis vs pneumonia as well as perinephric stranding.  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     RUQ U/S - Distended GB w/ sludge, no stones or biliary dilatation      No past medical history on file.  No past surgical history on file.  No family history on file.  Social History      Social History   • Marital status:      Spouse name: N/A   • Number of children: N/A   • Years of education: N/A     Occupational History   • Not on file.     Social History Main Topics   • Smoking status: Not on file   • Smokeless tobacco: Not on file   • Alcohol use Not on file   • Drug use: Not on file   • Sexual activity: Not on file     Other Topics Concern   • Not on file     Social History Narrative     Allergies   Allergen Reactions   • Niacin And Related      No current facility-administered medications on file prior to encounter.      No current outpatient prescriptions on file prior to encounter.       ROS:  Per HPI, all other systems were reviewed and were negative        OBJECTIVE     Vitals:    11/08/17 2020   BP: 99/71   Pulse: 86   Temp:    SpO2: 100%     General Appearance:  Conversant, in no acute distress  Eyes:  No scleral icterus or pallor, PERRLA  Ears, Nose, Mouth, Throat:  Atraumatic, oropharynx clear  Neck:  Trachea midline, thyroid normal  Respiratory:  Clear to auscultation bilaterally, normal effort, no tenderness to palpation  Cardiovascular:  Regular rate and rhythm, no murmurs, no peripheral edema, no thrill  Gastrointestinal:  Mildly firm, non-tender, non-distended, no hepatosplenomegaly  Skin:  Normal temperature, no rash  Psychiatric:  Mildly confused, able to answer most questions appropriately  Neuro:  No new focal neurologic deficits observed    Relevant imaging studies and labs from 11/08/17 were reviewed and interpreted by me    Assessment/Plan   IMPRESSION / PLAN     Inpatient Problem List:  83 y.o.female:  Principal Problem:    Sepsis  Active Problems:    Acute renal failure (ARF)       Impression:  83 y.o.female with relevant PMH of HLD, HTN (on triamterene/HCTZ) admitted [place] [facility] [place] w/ several days of profuse watery diarrhea, confusion, ELLE, and shock.  Overall c/w gastroenteritis that developed into UTI and Urosepsis with E. Coli in Urine and GNR bacteremia  (culture pending).  ELLE complicated by Diastolic HF, iatrogenic fluid overload, continued diuretic use despite volume depletion from diarrhea,  And septic shock with probably some component of ATN.    Plan:  -ELLE - consult nephrology, bedside ultrasound to assess volume status.  I was not able to visualize IVC but her RIJ completely collapsed with inspiration suggesting continued volume depletion.  Will restart bicarb gtt, hold diuresis.  Nephrology consult.  May end up needing Dialysis.  New omer placed.    -Diastolic HF Grade 3 - may complicate fluid resuscitation, monitor for HTN / pulmonary edema / etc    -E. Coli Urosepsis / GNR Bacteremia - no sensitivities, no risk factors for ESBL.  Continue Pip-Tazo, check vanco level, d/c vancomycin, re-check blood cultures.  On very low dose of levophed, will start po midodrine and try to wean it off overnight.  Cortisol at OSH normal.    -follow up labs    -SQ Heparin, SCD's    -Renal Diet    Critical care time: 60 min       Finesse Yost MD  Intensive Care Medicine  11/08/17 9:04 PM          Electronically signed by Finesse Yost MD at 11/8/2017  9:44 PM        Vital Signs (last 72 hrs)       11/18 0700  -  11/19 0659 11/19 0700  -  11/20 0659 11/20 0700  -  11/21 0659 11/21 0700  -  11/21 0836   Most Recent    Temp (°F) 98 -  99    98.3 -  98.6    98 -  98.1       98 (36.7)    Heart Rate 85 -  103    91 -  101    88 -  (!)126       94    Resp 16 -  18    14 -  18    14 -  18       16    /70 -  155/68    133/87 -  154/88    120/68 -  148/74       145/77    SpO2 (%) 91 -  98    (!)89 -  96    (!)83 -  100       96          Intake & Output (last 3 days)       11/18 0701 - 11/19 0700 11/19 0701 - 11/20 0700 11/20 0701 - 11/21 0700 11/21 0701 - 11/22 0700    P.O. 600 480 600     Blood        Total Intake(mL/kg) 600 (8.5) 480 (6.9) 600 (8.7)     Urine (mL/kg/hr) 3300 (1.9) 4500 (2.7) 1900 (1.2)     Stool  0 (0) 0 (0)     Total Output 3300 4500 1900    "   Net -2700 -4020 -1300              Unmeasured Urine Occurrence  1 x 2 x     Unmeasured Stool Occurrence  1 x 1 x         Hospital Medications (active)       Dose Frequency Start End    acetaminophen (TYLENOL) tablet 650 mg 650 mg Every 4 Hours PRN 11/8/2017     Sig - Route: Take 2 tablets by mouth Every 4 (Four) Hours As Needed for Headache or Fever (fever greater than 101.5 F). - Oral    Linked Group 1:  \"Or\" Linked Group Details        ALPRAZolam (XANAX) tablet 0.25 mg 0.25 mg 3 Times Daily PRN 11/12/2017 11/22/2017    Sig - Route: Take 1 tablet by mouth 3 (Three) Times a Day As Needed for Anxiety. - Oral    atorvastatin (LIPITOR) tablet 40 mg 40 mg Nightly 11/18/2017     Sig - Route: Take 1 tablet by mouth Every Night. - Oral    bisacodyl (DULCOLAX) suppository 10 mg 10 mg Daily PRN 11/8/2017     Sig - Route: Insert 1 suppository into the rectum Daily As Needed for Constipation. - Rectal    cefTRIAXone (ROCEPHIN) IVPB 1 g 1 g Every 24 Hours 11/11/2017 11/21/2017    Sig - Route: Infuse 50 mL into a venous catheter Daily. - Intravenous    guaiFENesin (ROBITUSSIN) 100 MG/5ML oral solution 200 mg 200 mg Every 4 Hours PRN 11/15/2017     Sig - Route: Take 10 mL by mouth Every 4 (Four) Hours As Needed for Cough. - Oral    heparin (porcine) 5000 UNIT/ML injection 5,000 Units 5,000 Units Every 8 Hours Scheduled 11/19/2017     Sig - Route: Inject 1 mL under the skin Every 8 (Eight) Hours. - Subcutaneous    ipratropium-albuterol (DUO-NEB) nebulizer solution 3 mL 3 mL Every 6 Hours PRN 11/17/2017     Sig - Route: Take 3 mL by nebulization Every 6 (Six) Hours As Needed for Shortness of Air. - Nebulization    Magnesium Sulfate 2 gram infusion- Mg 1.6 - 1.9 mg/dL 2 g As Needed 11/10/2017     Sig - Route: Infuse 50 mL into a venous catheter As Needed (Mg 1.6 - 1.9 mg/dL). - Intravenous    Linked Group 2:  \"Or\" Linked Group Details        magnesium sulfate 3 gram infusion (1gm x 3) - Mg 1.1 - 1.5 mg/dL 1 g As Needed 11/10/2017 " "    Sig - Route: Infuse 100 mL into a venous catheter As Needed (Mg 1.1 - 1.5 mg/dL). - Intravenous    Linked Group 2:  \"Or\" Linked Group Details        magnesium sulfate 4 gram infusion- Mg less than or equal to 1 mg/dL 4 g As Needed 11/10/2017     Sig - Route: Infuse 100 mL into a venous catheter As Needed (Mg less than or equal to 1 mg/dL). - Intravenous    Linked Group 2:  \"Or\" Linked Group Details        melatonin sublingual tablet 5 mg 5 mg Nightly PRN 11/15/2017     Sig - Route: Place 1 tablet under the tongue At Night As Needed for Sleep (can't sleep). - Sublingual    ondansetron (ZOFRAN) injection 4 mg 4 mg Every 6 Hours PRN 11/8/2017     Sig - Route: Infuse 2 mL into a venous catheter Every 6 (Six) Hours As Needed for Nausea or Vomiting. - Intravenous    pantoprazole (PROTONIX) EC tablet 40 mg 40 mg 2 Times Daily Before Meals 11/17/2017     Sig - Route: Take 1 tablet by mouth 2 (Two) Times a Day Before Meals. - Oral    phenol (CHLORASEPTIC) 1.4 % liquid 2 spray 2 spray Every 2 Hours PRN 11/8/2017     Sig - Route: Apply 2 sprays to the mouth or throat Every 2 (Two) Hours As Needed for Sore Throat. - Mouth/Throat    potassium chloride (MICRO-K) CR capsule 40 mEq 40 mEq As Needed 11/19/2017     Sig - Route: Take 4 capsules by mouth As Needed (potassium replacement.  see admin instructions). - Oral    Linked Group 3:  \"Or\" Linked Group Details        potassium chloride (KLOR-CON) packet 40 mEq (Discontinued) 40 mEq As Needed 11/19/2017 11/20/2017    Sig - Route: Take 40 mEq by mouth As Needed (potassium replacement, see admin instructions). - Oral    Linked Group 3:  \"Or\" Linked Group Details        potassium chloride 10 mEq in 100 mL IVPB (Discontinued) 10 mEq Every 1 Hour PRN 11/19/2017 11/20/2017    Sig - Route: Infuse 100 mL into a venous catheter Every 1 (One) Hour As Needed (potassium protocol PERIPHERAL - see admin instructions). - Intravenous    Linked Group 3:  \"Or\" Linked Group Details               " "  Physician Progress Notes (last 72 hours) (Notes from 11/18/2017  8:36 AM through 11/21/2017  8:36 AM)      Zara Wilde MD at 11/18/2017 11:32 AM  Version 1 of 1            LOS: 10 days    Patient Care Team:  No Known Provider as PCP - General    Reason For Visit:  F/U ELLE  Subjective   No new events comfortable eating in chair.    Review of Systems:    Denies any nausea vomiting no dysuria or gross hematuria, still have some shortness of breath without any chest pain.      Objective       atorvastatin 40 mg Oral Nightly   ceftriaxone 1 g Intravenous Q24H   furosemide 40 mg Intravenous Daily   pantoprazole 40 mg Oral BID AC            Vital Signs:  Blood pressure 124/70, pulse 97, temperature 98.6 °F (37 °C), resp. rate 16, height 63\" (160 cm), weight 156 lb 3.2 oz (70.9 kg), SpO2 96 %.    Flowsheet Rows         First Filed Value    Admission Height  63\" (160 cm) Documented at 11/08/2017 2018    Admission Weight  166 lb 0.1 oz (75.3 kg) Documented at 11/08/2017 2018          11/17 0701 - 11/18 0700  In: 1360 [P.O.:660]  Out: 1350 [Urine:1350]    Physical Exam:    General Appearance: No obvious distress.  Alert oriented  Eyes: PER, EOMI.  sclerae normal, no icterus  Lungs: Rhonchi's and Rales are heard at the bases.  Heart/CV: regular rhythm & normal rate, no murmur, no gallop, no rub and +1+ edema, 2+ on left edema  Abdomen: not distended, soft, non-tender, no masses,  bowel sounds present  Skin: No rash, Warm and dry  Extremities: Positive edema.  No cyanosis.  Neuro: Grossly intact alert oriented ×3.  No focal deficit.    Radiology:    Labs:    Results from last 7 days  Lab Units 11/18/17  0754 11/17/17  2358 11/17/17  1524 11/17/17  0604 11/15/17  0415   WBC 10*3/mm3 14.32*  --   --  14.57* 10.90*   HEMOGLOBIN g/dL 8.8*  9.0* 8.8* 8.0* 5.7* 9.0*   HEMATOCRIT % 26.4*  27.5* 26.9* 24.8* 17.9* 27.6*   PLATELETS 10*3/mm3 328  --   --  795 217       Results from last 7 days  Lab Units 11/18/17  0417 " 11/17/17  0401 11/16/17  0427 11/15/17  0415  11/12/17  0438   SODIUM mmol/L 142 141 140 143  < > 141   POTASSIUM mmol/L 3.5 3.8 3.4* 3.5  < > 3.4*   CHLORIDE mmol/L 107 107 106 108  < > 105   CO2 mmol/L 24.0 25.0 25.0 24.0  < > 23.0   BUN mg/dL 26* 33* 39* 49*  < > 69*   CREATININE mg/dL 2.00* 2.20* 2.60* 3.10*  < > 4.40*   CALCIUM mg/dL 8.9 8.7 9.0 8.7  < > 7.8*   PHOSPHORUS mg/dL 3.8 3.9 3.9 4.2  < > 5.8*   MAGNESIUM mg/dL  --  1.7  --  2.0  --  2.7   ALBUMIN g/dL 3.20 3.20 3.20 3.30  < > 3.40   < > = values in this interval not displayed.    Results from last 7 days  Lab Units 11/18/17  0417   GLUCOSE mg/dL 96         Results from last 7 days  Lab Units 11/18/17  0417   ALK PHOS U/L 57   BILIRUBIN mg/dL 1.2   ALT (SGPT) U/L 15   AST (SGOT) U/L 25       Results from last 7 days  Lab Units 11/12/17  1347   PH, ARTERIAL pH units 7.331*   PO2 ART mm Hg 77.3*   PCO2, ARTERIAL mm Hg 39.3   HCO3 ART mmol/L 20.8         Results from last 7 days  Lab Units 11/17/17  0946   COLOR UA  Yellow   CLARITY UA  Clear   PH, URINE  6.5   SPECIFIC GRAVITY, URINE  <=1.005   GLUCOSE UA  Negative   KETONES UA  Negative   BILIRUBIN UA  Negative   PROTEIN UA  Trace*   BLOOD UA  Moderate (2+)*   LEUKOCYTES UA  Small (1+)*   NITRITE UA  Negative            Assessment     Principal Problem:    Sepsis, probable source is urine  Active Problems:    Acute renal failure (ARF)    UTI (urinary tract infection)    Gastroenteritis    Impression: ELLE SECONDARY TO NSAIA USE AND SEPSIS. Renal function continues to improve slowly at this time.  Electrolytes will be replaced.  Anemia Stable  Edema.&    Will add diuretics today watch electrolytes, okay to be discharged from renal point a few any time.   TO REHAB SOON. WE CAN F/U IN OUR [facility] AFTER DISCHARGE.       Zara Wilde MD  11/18/17  11:32 AM           Electronically signed by Zara Wilde MD at 11/18/2017 11:43 AM      Dangelo Lopez MD at 11/18/2017  3:10 PM  Version 1 of  1             [facility] Medicine Services  PROGRESS NOTE    Patient Name: Jaclyn Garcia  : 1934  MRN: [id number]    Date of Admission: 2017  Length of Stay: 10  Primary Care Physician: No Known Provider    Subjective   Subjective     CC: abdominal pain and diarrhea    Subjective:  Resting in a chair in no acute distress and looks calm and comfortable. Abdominal pain has improved. SOB has improved also but still not at the baseline. No fever or chills.  No nausea, vomiting, diarrhea.  No headache or visual changes.  No difficulty swallowing.  No focal weakness or numbness.  No rashes or hives    Review of Systems      Otherwise ROS is negative except as mentioned above.    Objective   Objective     Vital Signs:   Temp:  [98.1 °F (36.7 °C)-98.9 °F (37.2 °C)] 98.6 °F (37 °C)  Heart Rate:  [] 87  Resp:  [16-18] 16  BP: (117-161)/(69-93) 124/70        Physical Exam:  Constitutional: No acute distress, awake, alert  Eyes: PERRLA, sclerae anicteric, no conjunctival injection  HENT: NCAT, mucous membranes moist  Neck: Supple, no thyromegaly, no lymphadenopathy, trachea midline  Respiratory: Clear to auscultation bilaterally, nonlabored respirations.  Patient is on 2 L of nasal cannula and saturating well   Cardiovascular: RRR, no murmurs, rubs, or gallops, palpable pedal pulses bilaterally  Gastrointestinal: Very obese, Positive bowel sounds, soft, mild lower quadrant tenderness, nondistended  Musculoskeletal: No bilateral ankle edema, no clubbing or cyanosis to extremities  Psychiatric: Appropriate affect, cooperative  Neurologic: Oriented x 3, strength symmetric in all extremities, Cranial Nerves grossly intact to confrontation, speech clear  Skin: No rashes  Results Reviewed:  I have personally reviewed current lab, radiology, and data and agree.      Results from last 7 days  Lab Units 17  0754 17  2358 17  1524 17  1443 17  0604 11/15/17  0415   11/12/17  0438   WBC 10*3/mm3 14.32*  --   --   --  14.57* 10.90*  < > 10.61   HEMOGLOBIN g/dL 8.8*  9.0* 8.8* 8.0*  --  5.7* 9.0*  < > 8.2*   HEMATOCRIT % 26.4*  27.5* 26.9* 24.8*  --  17.9* 27.6*  < > 24.8*   PLATELETS 10*3/mm3 328  --   --   --  286 217  < > 170   INR   --   --   --  1.21  --   --   --  1.32   < > = values in this interval not displayed.    Results from last 7 days  Lab Units 11/18/17  0417 11/17/17  0401 11/16/17  0427  11/12/17  1326   SODIUM mmol/L 142 141 140  < >  --    POTASSIUM mmol/L 3.5 3.8 3.4*  < >  --    CHLORIDE mmol/L 107 107 106  < >  --    CO2 mmol/L 24.0 25.0 25.0  < >  --    BUN mg/dL 26* 33* 39*  < >  --    CREATININE mg/dL 2.00* 2.20* 2.60*  < >  --    GLUCOSE mg/dL 96 114* 128*  < >  --    CALCIUM mg/dL 8.9 8.7 9.0  < >  --    ALT (SGPT) U/L 15  --   --   --   --    AST (SGOT) U/L 25  --   --   --   --    TROPONIN I ng/mL  --   --   --   --  0.055*   < > = values in this interval not displayed.  BNP   Date Value Ref Range Status   11/17/2017 90.0 0.0 - 100.0 pg/mL Final     No results found for: PHART    Microbiology Results Abnormal     Procedure Component Value - Date/Time    Urine Culture - Urine, Urine, Clean Catch [[id number]]  (Normal) Collected:  11/17/17 0946    Lab Status:  Preliminary result Specimen:  Urine from Urine, Clean Catch Updated:  11/18/17 0738     Urine Culture No growth    Blood Culture - Blood, [[id number]]  (Normal) Collected:  11/08/17 2049    Lab Status:  Final result Specimen:  Blood from Hand, Left Updated:  11/13/17 2216     Blood Culture No growth at 5 days    Blood Culture - Blood, [[id number]]  (Normal) Collected:  11/08/17 2055    Lab Status:  Final result Specimen:  Blood from Hand, Right Updated:  11/13/17 2216     Blood Culture No growth at 5 days    Eosinophil Smear - Urine, Urine, Clean Catch [[id number]]  (Normal) Collected:  11/11/17 1030    Lab Status:  Final result Specimen:  Urine from Urine, Clean Catch Updated:  11/11/17 1497      Eosinophil Smear 0 % EOS/100 Cells     Narrative:       No eosinophil seen    Urine Culture - Urine, Urine, Catheter [[id number]]  (Abnormal)  (Susceptibility) Collected:  11/08/17 2131    Lab Status:  Final result Specimen:  Urine from Urine, Catheter Updated:  11/11/17 1157     Urine Culture --      10,000-20,000 CFU/mL Escherichia coli (A)    Susceptibility      Escherichia coli     CHERYL     Ampicillin <=8 ug/ml Susceptible     Ampicillin + Sulbactam <=8/4 ug/ml Susceptible     Aztreonam <=8 ug/ml Susceptible     Cefepime <=8 ug/ml Susceptible     Cefotaxime <=2 ug/ml Susceptible     Ceftriaxone <=8 ug/ml Susceptible     Cefuroxime sodium <=4 ug/ml Susceptible     Cephalothin <=8 ug/ml Susceptible     Ertapenem <=1 ug/ml Susceptible     Gentamicin <=4 ug/ml Susceptible     Levofloxacin <=2 ug/ml Susceptible     Meropenem <=1 ug/ml Susceptible     Nitrofurantoin <=32 ug/ml Susceptible     Piperacillin + Tazobactam <=16 ug/ml Susceptible     Tetracycline <=4 ug/ml Susceptible     Tobramycin <=4 ug/ml Susceptible     Trimethoprim + Sulfamethoxazole <=2/38 ug/ml Susceptible                    Clostridium Difficile Toxin - Stool, Per Rectum [[id number]] Collected:  11/09/17 0701    Lab Status:  Final result Specimen:  Stool from Per Rectum Updated:  11/09/17 0922    Narrative:       The following orders were created for panel order Clostridium Difficile Toxin - Stool, Per Rectum.  Procedure                               Abnormality         Status                     ---------                               -----------         ------                     Clostridium Difficile To...[[id number]]  Normal              Final result                 Please view results for these tests on the individual orders.    Clostridium Difficile Toxin, PCR - Stool, Per Rectum [[id number]]  (Normal) Collected:  11/09/17 0701    Lab Status:  Final result Specimen:  Stool from Per Rectum Updated:  11/09/17 0922     C. Difficile Toxins by PCR Not  Detected    Narrative:         Performance characteristics of test not established for patients <2 years of age.  Negative for Toxigenic C. Difficile          Imaging Results (last 24 hours)     ** No results found for the last 24 hours. **        Results for orders placed during the hospital encounter of 11/08/17   Adult Transthoracic Echo Complete W/ Cont if Necessary Per Protocol    Narrative · Left atrial cavity size is borderline dilated.  · Mild to moderate tricuspid valve regurgitation is present.  · Calculated right ventricular systolic pressure from tricuspid   regurgitation is 40 mmHg.  · Mild aortic valve regurgitation is present.  · Mild mitral valve regurgitation is present.  · Left ventricular systolic function is normal. Estimated EF = 65%.  · There is no evidence of pericardial effusion.  · Mild pulmonary hypertension is present.  · The aortic valve exhibits sclerosis.  · Normal right ventricular cavity size, wall thickness, systolic function   and septal motion noted.  · Left ventricular diastolic function is normal.          I have reviewed the medications.    Assessment/Plan   Assessment / Plan     Hospital Problem List     * (Principal)Sepsis, probable source is urine    Acute renal failure (ARF)    UTI (urinary tract infection)    Gastroenteritis             Brief Hospital Course to date:  Jaclyn Garcia is a 83 y.o. female with past medical history significant for hypertension, hyperlipidemia, GERD.  Patient was brought to the emergency room after few days of diarrhea.  Patient was initially admitted to ICU for sepsis and UTI and also for acute kidney injury.  On admission creatinine was above 4 but it has come down since then.  Patient was transferred from ICU to the floor on 11/15/2017.      Assessment & Plan:    * sudden prop in H/H. spource of bleeding unknown at this point. Pt is clinically stable. S/P transfusion of 2 units, H/H stable for now.    Abdominal pain. KUB shows sarah filed  multiple loops of intestine. No obstruction, no pneumaotosis, improving.    * Urinary tract infection.    * Sepsis secondary to above improved    * Acute kidney injury with creatinine above 4 improving.    *Recent history of diarrhea and abdominal pain.  Abdominal pain has not completely resolved.    * edema of LEBL.    * SOB probably mild pulmonary edema.    * Severe generalized weakness    PLAN:  - cont antibiotics  - lasix  - labs in am  -monitor H/H closely    DVT Prophylaxis:      CODE STATUS: Conditional Code    Disposition: TBD.    Dangelo Lopez MD  17  3:11 PM         Electronically signed by Dangelo Lopez MD at 2017  3:14 PM      Tory Martínez MD at 2017 11:22 AM  Version 1 of 1             [facility] Medicine Services  PROGRESS NOTE    Patient Name: Jaclyn Garcia  : 1934  MRN: [id number]    Date of Admission: 2017  Length of Stay: 11  Primary Care Physician: No Known Provider    Subjective   Subjective     CC: abd pain and diarrhea    HPI:No acute events overnight, patient states that she had a good night, denies any fever or chills, no n/v/d    Review of Systems  Gen- No fevers, chills  CV- No chest pain, palpitations  Resp- No cough, dyspnea  GI- No N/V/D, abd pain    Otherwise ROS is negative except as mentioned in the HPI.    Objective   Objective     Vital Signs:   Temp:  [98 °F (36.7 °C)-99 °F (37.2 °C)] 98.8 °F (37.1 °C)  Heart Rate:  [] 92  Resp:  [16-18] 18  BP: (133-155)/(68-96) 155/68      Physical Exam:  Constitutional: No acute distress, awake, alert,   HENT: NCAT, mucous membranes moist  Respiratory: Clear to auscultation bilaterally, respiratory effort normal   Cardiovascular: RRR, no murmurs, rubs, or gallops, palpable pedal pulses bilaterally  Gastrointestinal: Positive bowel sounds, soft, nontender, nondistended  Musculoskeletal: bilateral ankle edema 2+  Psychiatric: Appropriate affect, cooperative  Neurologic: Oriented  x 3, strength symmetric in all extremities, Cranial Nerves grossly intact to confrontation, speech clear  Skin: No rashes    Results Reviewed:  I have personally reviewed current lab, radiology, and data and agree.      Results from last 7 days  Lab Units 11/19/17  0820 11/19/17  0023 11/18/17  1526 11/18/17  0754  11/17/17  1443 11/17/17  0604 11/15/17  0415   WBC 10*3/mm3  --   --   --  14.32*  --   --  14.57* 10.90*   HEMOGLOBIN g/dL 9.3* 9.1* 8.7* 8.8*  9.0*  < >  --  5.7* 9.0*   HEMATOCRIT % 28.0* 27.2* 25.8* 26.4*  27.5*  < >  --  17.9* 27.6*   PLATELETS 10*3/mm3  --   --   --  328  --   --  286 217   INR   --   --   --   --   --  1.21  --   --    < > = values in this interval not displayed.    Results from last 7 days  Lab Units 11/19/17  0820 11/18/17  0417 11/17/17  0401  11/12/17  1326   SODIUM mmol/L 142 142 141  < >  --    POTASSIUM mmol/L 2.8* 3.5 3.8  < >  --    CHLORIDE mmol/L 101 107 107  < >  --    CO2 mmol/L 30.0 24.0 25.0  < >  --    BUN mg/dL 29* 26* 33*  < >  --    CREATININE mg/dL 1.90* 2.00* 2.20*  < >  --    GLUCOSE mg/dL 115* 96 114*  < >  --    CALCIUM mg/dL 8.9 8.9 8.7  < >  --    ALT (SGPT) U/L  --  15  --   --   --    AST (SGOT) U/L  --  25  --   --   --    TROPONIN I ng/mL  --   --   --   --  0.055*   < > = values in this interval not displayed.  BNP   Date Value Ref Range Status   11/17/2017 90.0 0.0 - 100.0 pg/mL Final     No results found for: PHART    Microbiology Results Abnormal     Procedure Component Value - Date/Time    Urine Culture - Urine, Urine, Clean Catch [[id number]]  (Normal) Collected:  11/17/17 0946    Lab Status:  Final result Specimen:  Urine from Urine, Clean Catch Updated:  11/19/17 0743     Urine Culture No growth at 2 days    Blood Culture - Blood, [[id number]]  (Normal) Collected:  11/08/17 2049    Lab Status:  Final result Specimen:  Blood from Hand, Left Updated:  11/13/17 2216     Blood Culture No growth at 5 days    Blood Culture - Blood, [[id number]]   (Normal) Collected:  11/08/17 2055    Lab Status:  Final result Specimen:  Blood from Hand, Right Updated:  11/13/17 2216     Blood Culture No growth at 5 days    Eosinophil Smear - Urine, Urine, Clean Catch [[id number]]  (Normal) Collected:  11/11/17 1030    Lab Status:  Final result Specimen:  Urine from Urine, Clean Catch Updated:  11/11/17 1547     Eosinophil Smear 0 % EOS/100 Cells     Narrative:       No eosinophil seen    Urine Culture - Urine, Urine, Catheter [[id number]]  (Abnormal)  (Susceptibility) Collected:  11/08/17 2131    Lab Status:  Final result Specimen:  Urine from Urine, Catheter Updated:  11/11/17 1157     Urine Culture --      10,000-20,000 CFU/mL Escherichia coli (A)    Susceptibility      Escherichia coli     CHERYL     Ampicillin <=8 ug/ml Susceptible     Ampicillin + Sulbactam <=8/4 ug/ml Susceptible     Aztreonam <=8 ug/ml Susceptible     Cefepime <=8 ug/ml Susceptible     Cefotaxime <=2 ug/ml Susceptible     Ceftriaxone <=8 ug/ml Susceptible     Cefuroxime sodium <=4 ug/ml Susceptible     Cephalothin <=8 ug/ml Susceptible     Ertapenem <=1 ug/ml Susceptible     Gentamicin <=4 ug/ml Susceptible     Levofloxacin <=2 ug/ml Susceptible     Meropenem <=1 ug/ml Susceptible     Nitrofurantoin <=32 ug/ml Susceptible     Piperacillin + Tazobactam <=16 ug/ml Susceptible     Tetracycline <=4 ug/ml Susceptible     Tobramycin <=4 ug/ml Susceptible     Trimethoprim + Sulfamethoxazole <=2/38 ug/ml Susceptible                    Clostridium Difficile Toxin - Stool, Per Rectum [[id number]] Collected:  11/09/17 0701    Lab Status:  Final result Specimen:  Stool from Per Rectum Updated:  11/09/17 0922    Narrative:       The following orders were created for panel order Clostridium Difficile Toxin - Stool, Per Rectum.  Procedure                               Abnormality         Status                     ---------                               -----------         ------                     Clostridium Difficile  To...[[id number]]  Normal              Final result                 Please view results for these tests on the individual orders.    Clostridium Difficile Toxin, PCR - Stool, Per Rectum [[id number]]  (Normal) Collected:  11/09/17 0701    Lab Status:  Final result Specimen:  Stool from Per Rectum Updated:  11/09/17 0922     C. Difficile Toxins by PCR Not Detected    Narrative:         Performance characteristics of test not established for patients <2 years of age.  Negative for Toxigenic C. Difficile          Imaging Results (last 24 hours)     Procedure Component Value Units Date/Time    XR Chest 1 View [[id number]] Updated:  11/19/17 0323        Results for orders placed during the hospital encounter of 11/08/17   Adult Transthoracic Echo Complete W/ Cont if Necessary Per Protocol    Narrative · Left atrial cavity size is borderline dilated.  · Mild to moderate tricuspid valve regurgitation is present.  · Calculated right ventricular systolic pressure from tricuspid   regurgitation is 40 mmHg.  · Mild aortic valve regurgitation is present.  · Mild mitral valve regurgitation is present.  · Left ventricular systolic function is normal. Estimated EF = 65%.  · There is no evidence of pericardial effusion.  · Mild pulmonary hypertension is present.  · The aortic valve exhibits sclerosis.  · Normal right ventricular cavity size, wall thickness, systolic function   and septal motion noted.  · Left ventricular diastolic function is normal.          I have reviewed the medications.    Assessment/Plan   Assessment / Plan     Hospital Problem List     * (Principal)Sepsis, probable source is urine    Acute renal failure (ARF)    UTI (urinary tract infection)    Gastroenteritis        Brief Hospital Course to date:  Jaclyn Garcia is a 83 y.o. female past medical history significant for hypertension, hyperlipidemia, GERD.  Patient was brought to the emergency room after few days of diarrhea.  Patient was initially admitted to  "ICU for sepsis and UTI and also for acute kidney injury.  On admission creatinine was above 4 but it has come down since then.  Patient was transferred from ICU to the floor on 11/15/2017.    Assessment & Plan:  - Sepsis suspected to be secondary to Ecoli UTI, resolving, continue abx.  - Anemia- had drop in h/h, no source of bleeding found, she is s/p 2 units PRBC, continue to monitor h/h, stable for now  - Abd pain and diarrhea, KUB unrevealing, continue supportive therapy.  - ELLE suspected to be sec to sepsis vs NSAID use, Cr peaked to 4 now at 2, renal following,  - generalized weakness/deconditioning, will benefit from rehab.  - Hypertension- currently stable, home rx held sec to sepsis and ELLE, will consider restarting.  - Monitor and replete electrolytes    DVT Prophylaxis: [facility]    CODE STATUS: Conditional Code    Disposition: TBD, will need rehab CM following,     Tory Martínez MD  11/19/17  11:41 AM         Electronically signed by Tory Martínez MD at 11/19/2017 11:41 AM      Zara Wilde MD at 11/19/2017  1:17 PM  Version 1 of 1            LOS: 11 days    Patient Care Team:  No Known Provider as PCP - General    Reason For Visit:  F/U ELLE  Subjective   No new events, no obvious distress.    Review of Systems:    Eyes any nausea, vomiting, chest pain, shortness of breath, dysuria, hematuria..      Objective       atorvastatin 40 mg Oral Nightly   bumetanide 2 mg Intravenous Once   ceftriaxone 1 g Intravenous Q24H   heparin (porcine) 5,000 Units Subcutaneous Q8H   pantoprazole 40 mg Oral BID AC   potassium chloride 40 mEq Oral Q4H            Vital Signs:  Blood pressure 141/96, pulse 93, temperature 98.4 °F (36.9 °C), temperature source Oral, resp. rate 17, height 63\" (160 cm), weight 156 lb 3.2 oz (70.9 kg), SpO2 95 %.    Flowsheet Rows         First Filed Value    Admission Height  63\" (160 cm) Documented at 11/08/2017 2018    Admission Weight  166 lb 0.1 oz (75.3 kg) Documented at 11/08/2017 2018    "       11/18 0701 - 11/19 0700  In: 600 [P.O.:600]  Out: 3300 [Urine:3300]    Physical Exam:    General Appearance:Alert oriented ×3, no obvious distress,  Eyes: PER, EOMI   Lungs: Rhonchi's and Rales are heard at the bases.  Good air entry.  Equal chest movement  Heart/CV: regular rhythm & normal rate, no murmur, no gallop, no rub and +1+ edema, 2+ on left edema  Abdomen: not distended, soft, non-tender, no masses,  bowel sounds present  Skin: No rash, Warm and dry  Extremities: Positive edema.  No cyanosis.  Neuro: Grossly intact alert oriented ×3.  No focal deficit.    Radiology:    Labs:    Results from last 7 days  Lab Units 11/19/17  0820 11/19/17  0023 11/18/17  1526 11/18/17  0754  11/17/17  0604 11/15/17  0415   WBC 10*3/mm3  --   --   --  14.32*  --  14.57* 10.90*   HEMOGLOBIN g/dL 9.3* 9.1* 8.7* 8.8*  9.0*  < > 5.7* 9.0*   HEMATOCRIT % 28.0* 27.2* 25.8* 26.4*  27.5*  < > 17.9* 27.6*   PLATELETS 10*3/mm3  --   --   --  328  --  286 217   < > = values in this interval not displayed.    Results from last 7 days  Lab Units 11/19/17  0820 11/18/17  0417 11/17/17  0401 11/16/17  0427 11/15/17  0415   SODIUM mmol/L 142 142 141 140 143   POTASSIUM mmol/L 2.8* 3.5 3.8 3.4* 3.5   CHLORIDE mmol/L 101 107 107 106 108   CO2 mmol/L 30.0 24.0 25.0 25.0 24.0   BUN mg/dL 29* 26* 33* 39* 49*   CREATININE mg/dL 1.90* 2.00* 2.20* 2.60* 3.10*   CALCIUM mg/dL 8.9 8.9 8.7 9.0 8.7   PHOSPHORUS mg/dL  --  3.8 3.9 3.9 4.2   MAGNESIUM mg/dL  --   --  1.7  --  2.0   ALBUMIN g/dL  --  3.20 3.20 3.20 3.30       Results from last 7 days  Lab Units 11/19/17  0820   GLUCOSE mg/dL 115*         Results from last 7 days  Lab Units 11/18/17  0417   ALK PHOS U/L 57   BILIRUBIN mg/dL 1.2   ALT (SGPT) U/L 15   AST (SGOT) U/L 25       Results from last 7 days  Lab Units 11/12/17  1347   PH, ARTERIAL pH units 7.331*   PO2 ART mm Hg 77.3*   PCO2, ARTERIAL mm Hg 39.3   HCO3 ART mmol/L 20.8         Results from last 7 days  Lab Units 11/17/17  0946    COLOR UA  Yellow   CLARITY UA  Clear   PH, URINE  6.5   SPECIFIC GRAVITY, URINE  <=1.005   GLUCOSE UA  Negative   KETONES UA  Negative   BILIRUBIN UA  Negative   PROTEIN UA  Trace*   BLOOD UA  Moderate (2+)*   LEUKOCYTES UA  Small (1+)*   NITRITE UA  Negative            Assessment     Principal Problem:    Sepsis, probable source is urine  Active Problems:    Acute renal failure (ARF)    UTI (urinary tract infection)    Gastroenteritis    Impression: ELLE SECONDARY TO NSAIA USE AND SEPSIS. Renal function Stable slow improvement, Electrolytes will be replaced.  Anemia Stable  Edema.     Continue with the current diuretics today watch electrolytes, okay to be discharged from renal point a few any time.   TO REHAB SOON. WE CAN F/U IN OUR [facility] AFTER DISCHARGE.       Zara Wilde MD  17  1:17 PM           Electronically signed by Zara Wilde MD at 2017  1:42 PM      Tory Martínez MD at 2017  9:41 AM  Version 1 of 1             [facility] Medicine Services  PROGRESS NOTE    Patient Name: Jaclyn Garcia  : 1934  MRN: [id number]    Date of Admission: 2017  Length of Stay: 12  Primary Care Physician: No Known Provider    Subjective   Subjective     CC:abd pain and diarrhea    HPI:No acute events overnight, patient states that she had a good night, worked with PT this morning, does have some n/v    Review of Systems  Gen- No fevers, chills  CV- No chest pain, palpitations  Resp- No cough, dyspnea  GI- V/D, +nausea, abd pain    Otherwise ROS is negative except as mentioned in the HPI.    Objective   Objective     Vital Signs:   Temp:  [98.1 °F (36.7 °C)-98.6 °F (37 °C)] 98.1 °F (36.7 °C)  Heart Rate:  [] 91  Resp:  [14-18] 16  BP: (120-154)/(68-88) 120/68      Physical Exam:  Constitutional: No acute distress, awake, alert, seated in chair  HENT: NCAT, mucous membranes moist  Respiratory: Clear to auscultation bilaterally, respiratory effort  normal   Cardiovascular: RRR, no murmurs, rubs, or gallops, palpable pedal pulses bilaterally  Gastrointestinal: Positive bowel sounds, soft, nontender, nondistended  Musculoskeletal: No bilateral ankle edema  Psychiatric: Appropriate affect, cooperative  Neurologic: Oriented x 3, strength symmetric in all extremities, Cranial Nerves grossly intact to confrontation, speech clear  Skin: No rashes    Results Reviewed:  I have personally reviewed current lab, radiology, and data and agree.      Results from last 7 days  Lab Units 11/20/17 0424 11/19/17 0820 11/19/17  0023  11/18/17  0754  11/17/17  1443 11/17/17  0604 11/15/17  0415   WBC 10*3/mm3  --   --   --   --  14.32*  --   --  14.57* 10.90*   HEMOGLOBIN g/dL 8.7* 9.3* 9.1*  < > 8.8*  9.0*  < >  --  5.7* 9.0*   HEMATOCRIT % 27.1* 28.0* 27.2*  < > 26.4*  27.5*  < >  --  17.9* 27.6*   PLATELETS 10*3/mm3  --   --   --   --  328  --   --  286 217   INR   --   --   --   --   --   --  1.21  --   --    < > = values in this interval not displayed.    Results from last 7 days  Lab Units 11/20/17 0424 11/19/17 2025 11/19/17 0820 11/18/17  0417   SODIUM mmol/L 141  --  142 142   POTASSIUM mmol/L 3.4* 3.4* 2.8* 3.5   CHLORIDE mmol/L 100  --  101 107   CO2 mmol/L 30.0  --  30.0 24.0   BUN mg/dL 29*  --  29* 26*   CREATININE mg/dL 1.80*  --  1.90* 2.00*   GLUCOSE mg/dL 109*  --  115* 96   CALCIUM mg/dL 8.5*  --  8.9 8.9   ALT (SGPT) U/L  --   --   --  15   AST (SGOT) U/L  --   --   --  25     No results found for: BNP  No results found for: PHART    Microbiology Results Abnormal     Procedure Component Value - Date/Time    Urine Culture - Urine, Urine, Clean Catch [[id number]]  (Normal) Collected:  11/17/17 0946    Lab Status:  Final result Specimen:  Urine from Urine, Clean Catch Updated:  11/19/17 0743     Urine Culture No growth at 2 days    Blood Culture - Blood, [[id number]]  (Normal) Collected:  11/08/17 2049    Lab Status:  Final result Specimen:  Blood from Hand,  Left Updated:  11/13/17 2216     Blood Culture No growth at 5 days    Blood Culture - Blood, [[id number]]  (Normal) Collected:  11/08/17 2055    Lab Status:  Final result Specimen:  Blood from Hand, Right Updated:  11/13/17 2216     Blood Culture No growth at 5 days    Eosinophil Smear - Urine, Urine, Clean Catch [[id number]]  (Normal) Collected:  11/11/17 1030    Lab Status:  Final result Specimen:  Urine from Urine, Clean Catch Updated:  11/11/17 1547     Eosinophil Smear 0 % EOS/100 Cells     Narrative:       No eosinophil seen    Urine Culture - Urine, Urine, Catheter [[id number]]  (Abnormal)  (Susceptibility) Collected:  11/08/17 2131    Lab Status:  Final result Specimen:  Urine from Urine, Catheter Updated:  11/11/17 1157     Urine Culture --      10,000-20,000 CFU/mL Escherichia coli (A)    Susceptibility      Escherichia coli     CHERYL     Ampicillin <=8 ug/ml Susceptible     Ampicillin + Sulbactam <=8/4 ug/ml Susceptible     Aztreonam <=8 ug/ml Susceptible     Cefepime <=8 ug/ml Susceptible     Cefotaxime <=2 ug/ml Susceptible     Ceftriaxone <=8 ug/ml Susceptible     Cefuroxime sodium <=4 ug/ml Susceptible     Cephalothin <=8 ug/ml Susceptible     Ertapenem <=1 ug/ml Susceptible     Gentamicin <=4 ug/ml Susceptible     Levofloxacin <=2 ug/ml Susceptible     Meropenem <=1 ug/ml Susceptible     Nitrofurantoin <=32 ug/ml Susceptible     Piperacillin + Tazobactam <=16 ug/ml Susceptible     Tetracycline <=4 ug/ml Susceptible     Tobramycin <=4 ug/ml Susceptible     Trimethoprim + Sulfamethoxazole <=2/38 ug/ml Susceptible                    Clostridium Difficile Toxin - Stool, Per Rectum [[id number]] Collected:  11/09/17 0701    Lab Status:  Final result Specimen:  Stool from Per Rectum Updated:  11/09/17 0922    Narrative:       The following orders were created for panel order Clostridium Difficile Toxin - Stool, Per Rectum.  Procedure                               Abnormality         Status                      ---------                               -----------         ------                     Clostridium Difficile To...[[id number]]  Normal              Final result                 Please view results for these tests on the individual orders.    Clostridium Difficile Toxin, PCR - Stool, Per Rectum [[id number]]  (Normal) Collected:  11/09/17 0701    Lab Status:  Final result Specimen:  Stool from Per Rectum Updated:  11/09/17 0922     C. Difficile Toxins by PCR Not Detected    Narrative:         Performance characteristics of test not established for patients <2 years of age.  Negative for Toxigenic C. Difficile          Imaging Results (last 24 hours)     Procedure Component Value Units Date/Time    XR Chest 1 View [[id number]] Collected:  11/19/17 1410     Updated:  11/19/17 2156    Narrative:          EXAMINATION: XR CHEST 1 VW - 11/19/2017     INDICATION: A41.51-Sepsis due to Escherichia coli (e. coli); N17.9-Acute  kidney failure, unspecified; Z74.09-Other reduced mobility.      COMPARISON: 11/14/2017.     FINDINGS: Left IJ catheter remains in the distal left brachiocephalic  vein or proximal SVC. Heart is normal in size. Vasculature is upper  limits of normal. There is improving left basilar atelectasis plus/minus  effusion. Lungs appear clear elsewhere. Increased upper abdominal bowel  gas may represent low-level ileus.           Impression:       Improving aeration of the left lung base. No new chest  disease is seen.     DICTATED:     11/19/2017  EDITED:          11/19/2017        This report was finalized on 11/19/2017 9:54 PM by DR. Rolly Stephen MD.           Results for orders placed during the hospital encounter of 11/08/17   Adult Transthoracic Echo Complete W/ Cont if Necessary Per Protocol    Narrative · Left atrial cavity size is borderline dilated.  · Mild to moderate tricuspid valve regurgitation is present.  · Calculated right ventricular systolic pressure from tricuspid   regurgitation is 40 mmHg.  ·  Mild aortic valve regurgitation is present.  · Mild mitral valve regurgitation is present.  · Left ventricular systolic function is normal. Estimated EF = 65%.  · There is no evidence of pericardial effusion.  · Mild pulmonary hypertension is present.  · The aortic valve exhibits sclerosis.  · Normal right ventricular cavity size, wall thickness, systolic function   and septal motion noted.  · Left ventricular diastolic function is normal.          I have reviewed the medications.    Assessment/Plan   Assessment / Plan     Hospital Problem List     * (Principal)Sepsis, probable source is urine    Acute renal failure (ARF)    UTI (urinary tract infection)    Gastroenteritis           Brief Hospital Course to date:  Jaclyn Garcia is a 83 y.o. female past medical history significant for hypertension, hyperlipidemia, GERD.  Patient was brought to the emergency room after few days of diarrhea.  Patient was initially admitted to ICU for sepsis and UTI and also for acute kidney injury.  On admission creatinine was above 4 but it has come down since then.  Patient was transferred from ICU to the floor on 11/15/2017.     Assessment & Plan:  - Sepsis suspected to be secondary to Ecoli UTI, resolving, continue abx.  - Anemia- had drop in h/h, no source of bleeding found, she is s/p 2 units PRBC, continue to monitor h/h, stable for now  - Abd pain and diarrhea, KUB unrevealing, continue supportive therapy.  - ELLE suspected to be sec to sepsis vs NSAID use, Cr peaked to 4 now at 2, renal following,  - generalized weakness/deconditioning, will benefit from rehab.  - Hypertension- currently stable, home rx held sec to sepsis and ELLE, will consider restarting.  - Monitor and replete electrolytes     DVT Prophylaxis: [facility]     CODE STATUS: Conditional Code     Disposition: TBD, will need rehab CM following,     Tory Martínez MD  11/20/17  9:44 AM         Electronically signed by Tory Martínez MD at 11/20/2017  9:44 AM      Zara  "Scooter Wilde MD at 11/20/2017  5:18 PM  Version 1 of 1            LOS: 12 days    Patient Care Team:  No Known Provider as PCP - General    Reason For Visit:  F/U ELLE  Subjective   No new events stable.    Review of Systems:   Denies any nausea vomiting, no chest pain or shortness of breath, no dysuria or hematuria.      Objective       atorvastatin 40 mg Oral Nightly   ceftriaxone 1 g Intravenous Q24H   heparin (porcine) 5,000 Units Subcutaneous Q8H   pantoprazole 40 mg Oral BID AC            Vital Signs:  Blood pressure 120/68, pulse 97, temperature 98.1 °F (36.7 °C), temperature source Oral, resp. rate 14, height 63\" (160 cm), weight 154 lb 4.8 oz (70 kg), SpO2 96 %.    Flowsheet Rows         First Filed Value    Admission Height  63\" (160 cm) Documented at 11/08/2017 2018    Admission Weight  166 lb 0.1 oz (75.3 kg) Documented at 11/08/2017 2018 11/19 0701 - 11/20 0700  In: 480 [P.O.:480]  Out: 4500 [Urine:4500]    Physical Exam:    General Appearance:Awake, alert, no obvious distress.  Eyes: PER, EOMI   Lungs: Rhonchi's and Rales are heard at the bases.  Good air entry.  Equal chest movement  Heart/CV: regular rhythm & normal rate, no murmur, no gallop, no rub and trace right edema, 1+ on left edema  Abdomen: not distended, soft, non-tender, no masses,  bowel sounds present  Skin: No rash, Warm and dry  Extremities: Positive edema.  No cyanosis.  Neuro: Grossly intact alert oriented ×3.  No focal deficit.    Radiology:    Labs:    Results from last 7 days  Lab Units 11/20/17  0424 11/19/17  0820 11/19/17  0023  11/18/17  0754  11/17/17  0604 11/15/17  0415   WBC 10*3/mm3  --   --   --   --  14.32*  --  14.57* 10.90*   HEMOGLOBIN g/dL 8.7* 9.3* 9.1*  < > 8.8*  9.0*  < > 5.7* 9.0*   HEMATOCRIT % 27.1* 28.0* 27.2*  < > 26.4*  27.5*  < > 17.9* 27.6*   PLATELETS 10*3/mm3  --   --   --   --  328  --  286 217   < > = values in this interval not displayed.    Results from last 7 days  Lab Units 11/20/17  1041 " 11/20/17 0424 11/19/17 2025 11/19/17 0820 11/18/17 0417 11/17/17  0401 11/16/17  0427 11/15/17  0415   SODIUM mmol/L  --  141  --  142 142 141 140 143   POTASSIUM mmol/L 4.2 3.4* 3.4* 2.8* 3.5 3.8 3.4* 3.5   CHLORIDE mmol/L  --  100  --  101 107 107 106 108   CO2 mmol/L  --  30.0  --  30.0 24.0 25.0 25.0 24.0   BUN mg/dL  --  29*  --  29* 26* 33* 39* 49*   CREATININE mg/dL  --  1.80*  --  1.90* 2.00* 2.20* 2.60* 3.10*   CALCIUM mg/dL  --  8.5*  --  8.9 8.9 8.7 9.0 8.7   PHOSPHORUS mg/dL  --  3.7  --   --  3.8 3.9 3.9 4.2   MAGNESIUM mg/dL  --   --   --   --   --  1.7  --  2.0   ALBUMIN g/dL  --  3.40  --   --  3.20 3.20 3.20 3.30       Results from last 7 days  Lab Units 11/20/17  0424   GLUCOSE mg/dL 109*         Results from last 7 days  Lab Units 11/18/17  0417   ALK PHOS U/L 57   BILIRUBIN mg/dL 1.2   ALT (SGPT) U/L 15   AST (SGOT) U/L 25             Results from last 7 days  Lab Units 11/17/17  0946   COLOR UA  Yellow   CLARITY UA  Clear   PH, URINE  6.5   SPECIFIC GRAVITY, URINE  <=1.005   GLUCOSE UA  Negative   KETONES UA  Negative   BILIRUBIN UA  Negative   PROTEIN UA  Trace*   BLOOD UA  Moderate (2+)*   LEUKOCYTES UA  Small (1+)*   NITRITE UA  Negative            Assessment     Principal Problem:    Sepsis, probable source is urine  Active Problems:    Acute renal failure (ARF)    UTI (urinary tract infection)    Gastroenteritis    Impression: ELLE SECONDARY TO NSAIA USE AND SEPSIS. Renal function Stable slow improvement, Electrolytes will be replaced.  Anemia Stable  Edema.     Continue with the current diuretics today watch electrolytes, okay to be discharged from [facility] a few any time.   TO REHAB SOON. WE CAN F/U IN OUR [place] [facility] AFTER DISCHARGE.       Zara Wilde MD  11/20/17  5:18 PM           Electronically signed by Zara Wilde MD at 11/20/2017  5:38 PM        Consult Notes (last 72 hours) (Notes from 11/18/2017  8:36 AM through 11/21/2017  8:36 AM)     No notes of this  type exist for this encounter.           Physical Therapy Notes (last 72 hours) (Notes from 11/18/2017  8:36 AM through 11/21/2017  8:36 AM)      Bharti Masters, PTA at 11/20/2017 10:04 AM  Version 1 of 1         Problem: Patient Care Overview (Adult)  Goal: Plan of Care Review  Outcome: Ongoing (interventions implemented as appropriate)    11/20/17 1002   Coping/Psychosocial Response Interventions   Plan Of Care Reviewed With patient   Patient Care Overview   Progress improving   Outcome Evaluation   Outcome Summary/Follow up Plan pt much improved.up in chair.ambulat 200 ft with 1 standing rest..went over all exercises         Problem: Inpatient Physical Therapy  Goal: Bed Mobility Goal LTG- PT  Outcome: Ongoing (interventions implemented as appropriate)    11/13/17 1006 11/20/17 1002   Bed Mobility PT LTG   Bed Mobility PT LTG, Date Established 11/13/17 --    Bed Mobility PT LTG, Time to Achieve 2 wks --    Bed Mobility PT LTG, Activity Type supine to sit/sit to supine --    Bed Mobility PT LTG, [place] Level supervision required --    Bed Mobility PT LTG, Outcome --  goal ongoing       Goal: Transfer Training Goal 1 LTG- PT  Outcome: Ongoing (interventions implemented as appropriate)    11/13/17 1006 11/20/17 1002   Transfer Training PT LTG   Transfer Training PT LTG, Date Established 11/13/17 --    Transfer Training PT LTG, Time to Achieve 2 wks --    Transfer Training PT LTG, Activity Type sit to stand/stand to sit --    Transfer Training PT LTG, [place] Level supervision required --    Transfer Training PT LTG, Assist Device walker, rolling --    Transfer Training PT LTG, Outcome --  goal ongoing       Goal: Gait Training Goal LTG- PT  Outcome: Ongoing (interventions implemented as appropriate)    11/13/17 1006 11/20/17 1002   Gait Training PT LTG   Gait Training Goal PT LTG, Date Established 11/13/17 --    Gait Training Goal PT LTG, Time to Achieve 2 wks --    Gait Training Goal PT LTG, [place]  Level supervision required --    Gait Training Goal PT LTG, Assist Device walker, rolling --    Gait Training Goal PT LTG, Distance to Achieve 200 --    Gait Training Goal PT LTG, Outcome --  goal partially met       Goal: Stair Training Goal LTG- PT  Outcome: Ongoing (interventions implemented as appropriate)    17 1006 17 1002   Stair Training PT LTG   Stair Training Goal PT LTG, Date Established 17 --    Stair Training Goal PT LTG, Time to Achieve 2 wks --    Stair Training Goal PT LTG, Number of Steps 2 --    Stair Training Goal PT LTG, [place] Level contact guard assist --    Stair Training Goal PT LTG, Outcome --  goal ongoing              Electronically signed by Bharti Masters PTA at 2017 10:04 AM      Bharti Masters PTA at 2017 10:05 AM  Version 1 of 1         Acute Care - Physical Therapy Treatment Note  [facility]     Patient Name: Jaclyn Garcia  : 1934  MRN: [id number]  Today's Date: 2017  Onset of Illness/Injury or Date of Surgery Date: 17  Date of Referral to PT: 11/10/17  Referring Physician: MD Romelia    Admit Date: 2017    Visit Dx:    ICD-10-CM ICD-9-CM   1. Sepsis due to Escherichia coli A41.51 038.42     995.91   2. Acute renal failure, unspecified acute renal failure type N17.9 584.9   3. Impaired functional mobility, balance, gait, and endurance Z74.09 V49.89     Patient Active Problem List   Diagnosis   • Sepsis, probable source is urine   • Acute renal failure (ARF)   • UTI (urinary tract infection)   • Gastroenteritis               Adult Rehabilitation Note       17 0850          Rehab Assessment/Intervention    Discipline physical therapy assistant  -UD      Document Type therapy note (daily note)  -UD      Subjective Information agree to therapy;no complaints  -UD      Patient Effort, Rehab Treatment good  -UD      Symptoms Noted During/After Treatment fatigue  -UD      Precautions/Limitations fall precautions  -UD       Recorded by [UD] Bharti Masters PTA      Vital Signs    O2 Delivery Post Treatment room air  -UD      Pre Patient Position Sitting  -UD      Intra Patient Position Standing  -UD      Post Patient Position Sitting  -UD      Recorded by [UD] Bharti Masters PTA      Pain Assessment    Pain Assessment No/denies pain  -UD      Pain Score 0  -UD      Post Pain Score 0  -UD      Recorded by [UD] Bharti Masters PTA      Cognitive Assessment/Intervention    Orientation Status oriented x 4  -UD      Follows Commands/Answers Questions 100% of the time  -UD      Personal Safety Interventions fall prevention program maintained  -UD      Recorded by [UD] Bharti Masters PTA      Bed Mobility, Assessment/Treatment    Bed Mob, Supine to Sit, [place] not tested   up in chair  -UD      Recorded by [UD] Bharti Masters PTA      Transfer Assessment/Treatment    Transfers, Sit-Stand [place] contact guard assist;2 person assist required  -UD      Transfers, Stand-Sit [place] contact guard assist;2 person assist required  -UD      Transfers, Sit-Stand-Sit, Assist Device rolling walker  -UD      Recorded by [UD] Bharti Masters PTA      Gait Assessment/Treatment    Gait, [place] Level contact guard assist;2 person assist required  -UD      Gait, Assistive Device rolling walker  -UD      Gait, Distance (Feet) 200  -UD      Gait, Gait Deviations step length decreased  -UD      Gait, Safety Issues step length decreased  -UD      Gait, Impairments strength decreased  -UD      Recorded by [UD] Bharti Masters PTA      Therapy Exercises    Bilateral Lower Extremities AROM:;10 reps;sitting  -UD      Bilateral Upper Extremity AROM:;10 reps;sitting  -UD      Recorded by [UD] Bharti Masters PTA      Positioning and Restraints    Pre-Treatment Position sitting in chair/recliner  -UD      Post Treatment Position chair  -UD      In Chair notified nsg;reclined;sitting;call light within reach;with family/caregiver;legs elevated  -UD      Recorded  by [UD] Bharti Masters PTA        User Key  (r) = Recorded By, (t) = Taken By, (c) = Cosigned By    Initials Name Effective Dates    UD Bharti Masters PTA 06/22/15 -                 IP PT Goals       11/20/17 1002 11/16/17 1407 11/15/17 0923    Bed Mobility PT LTG    Bed Mobility PT LTG, Outcome goal ongoing  -UD goal ongoing  -UD goal ongoing  -LS    Transfer Training PT LTG    Transfer Training PT LTG, Outcome goal ongoing  -UD goal ongoing  -UD goal ongoing  -LS    Gait Training PT LTG    Gait Training Goal PT LTG, Outcome goal partially met  -UD goal ongoing  -UD goal ongoing  -LS    Stair Training PT LTG    Stair Training Goal PT LTG, Outcome goal ongoing  -UD goal ongoing  -UD goal ongoing  -LS      11/14/17 1213 11/13/17 1006       Bed Mobility PT LTG    Bed Mobility PT LTG, Date Established  11/13/17  -LS     Bed Mobility PT LTG, Time to Achieve  2 wks  -LS     Bed Mobility PT LTG, Activity Type  supine to sit/sit to supine  -LS     Bed Mobility PT LTG, [place] Level  supervision required  -LS     Bed Mobility PT LTG, Outcome goal ongoing  -LS      Transfer Training PT LTG    Transfer Training PT LTG, Date Established  11/13/17  -LS     Transfer Training PT LTG, Time to Achieve  2 wks  -LS     Transfer Training PT LTG, Activity Type  sit to stand/stand to sit  -LS     Transfer Training PT LTG, [place] Level  supervision required  -LS     Transfer Training PT LTG, Assist Device  walker, rolling  -LS     Transfer Training PT LTG, Outcome goal ongoing  -LS      Gait Training PT LTG    Gait Training Goal PT LTG, Date Established  11/13/17  -LS     Gait Training Goal PT LTG, Time to Achieve  2 wks  -LS     Gait Training Goal PT LTG, [place] Level  supervision required  -LS     Gait Training Goal PT LTG, Assist Device  walker, rolling  -LS     Gait Training Goal PT LTG, Distance to Achieve  200  -LS     Gait Training Goal PT LTG, Outcome goal ongoing  -LS      Stair Training PT LTG    Stair Training  Goal PT LTG, Date Established  11/13/17  -LS     Stair Training Goal PT LTG, Time to Achieve  2 wks  -LS     Stair Training Goal PT LTG, Number of Steps  2  -LS     Stair Training Goal PT LTG, [place] Level  contact guard assist  -LS     Stair Training Goal PT LTG, Outcome goal ongoing  -LS        User Key  (r) = Recorded By, (t) = Taken By, (c) = Cosigned By    Initials Name Provider Type    VIELKA Masters, PTA Physical Therapy Assistant    WILMER Xiao, PT Physical Therapist          Physical Therapy Education     Title: PT OT SLP Therapies (Active)     Topic: Physical Therapy (Active)     Point: Mobility training (Active)    Learning Progress Summary    Learner Readiness Method Response Comment Documented by Status   Patient Eager E,D DU,NR   11/20/17 1002 Done    Acceptance E,D DU,NR   11/16/17 1407 Done    Acceptance E,D NR Discussed at length benefit of progressing mobility to promote PLOF. Needs encouragement.  11/15/17 0922 Active    Acceptance E VU,NR   11/14/17 2348 Done    Acceptance E,D NR   11/14/17 1213 Active    Acceptance E VU,NR   11/14/17 0414 Done    Acceptance E,D NR   11/13/17 1005 Active   Family Eager E,D DU,NR   11/20/17 1002 Done    Acceptance E,D DU,NR   11/16/17 1407 Done    Acceptance E VU,NR   11/14/17 2348 Done    Acceptance E VU,NR   11/14/17 0414 Done    Acceptance E,D NR   11/13/17 1005 Active   Significant Other Acceptance E,D NR   11/14/17 1213 Active               Point: Home exercise program (Active)    Learning Progress Summary    Learner Readiness Method Response Comment Documented by Status   Patient Eager E,D DU,NR   11/20/17 1002 Done    Acceptance E,D DU,NR   11/16/17 1407 Done    Acceptance E,D NR Discussed at length benefit of progressing mobility to promote PLOF. Needs encouragement.  11/15/17 0922 Active    Acceptance E VU,NR   11/14/17 2348 Done    Acceptance E,D NR   11/14/17 1213 Active    Acceptance E VU,NR   11/14/17  0414 Done   Family Eager E,D DU,NR   11/20/17 1002 Done    Acceptance E,D DU,NR   11/16/17 1407 Done    Acceptance E VU,NR   11/14/17 2348 Done    Acceptance E VU,NR   11/14/17 0414 Done   Significant Other Acceptance E,D NR   11/14/17 1213 Active               Point: Body mechanics (Active)    Learning Progress Summary    Learner Readiness Method Response Comment Documented by Status   Patient Eager E,D DU,NR   11/20/17 1002 Done    Acceptance E,D DU,NR   11/16/17 1407 Done    Acceptance E,D NR Discussed at length benefit of progressing mobility to promote PLOF. Needs encouragement.  11/15/17 0922 Active    Acceptance E VU,NR   11/14/17 2348 Done    Acceptance E,D NR   11/14/17 1213 Active    Acceptance E VU,NR   11/14/17 0414 Done    Acceptance E,D NR   11/13/17 1005 Active   Family Eager E,D DU,NR   11/20/17 1002 Done    Acceptance E,D DU,NR   11/16/17 1407 Done    Acceptance E VU,NR   11/14/17 2348 Done    Acceptance E VU,NR   11/14/17 0414 Done    Acceptance E,D NR   11/13/17 1005 Active   Significant Other Acceptance E,D NR   11/14/17 1213 Active               Point: Precautions (Active)    Learning Progress Summary    Learner Readiness Method Response Comment Documented by Status   Patient Eager E,D DU,NR   11/20/17 1002 Done    Acceptance E,D DU,NR   11/16/17 1407 Done    Acceptance E,D NR Discussed at length benefit of progressing mobility to promote PLOF. Needs encouragement.  11/15/17 0922 Active    Acceptance E VU,NR   11/14/17 2348 Done    Acceptance E,D NR   11/14/17 1213 Active    Acceptance E VU,NR   11/14/17 0414 Done    Acceptance E,D NR   11/13/17 1005 Active   Family Eager E,D DU,NR   11/20/17 1002 Done    Acceptance E,D DU,NR   11/16/17 1407 Done    Acceptance E VU,NR   11/14/17 2348 Done    Acceptance E VU,NR   11/14/17 0414 Done    Acceptance E,D NR  LS 11/13/17 1005 Active   Significant Other Acceptance E,D NR  LS 11/14/17 1213 Active                       User Key     Initials Effective Dates Name Provider Type Discipline    UD 06/22/15 -  Bharti Masters PTA Physical Therapy Assistant PT    LS 06/19/15 -  Buffy Xiao PT Physical Therapist PT    AC 07/03/17 -  Claudia Dietrich RN Registered Nurse Nurse                    PT Recommendation and Plan  Anticipated Discharge Disposition: skilled nursing facility  PT Frequency: daily  Plan of Care Review  Plan Of Care Reviewed With: patient  Progress: improving  Outcome Summary/Follow up Plan: pt much improved.up in chair.ambulat 200 ft with 1 standing rest..went over all exercises          Outcome Measures       11/20/17 0850          How much help from another person do you currently need...    Turning from your back to your side while in flat bed without using bedrails? 3  -UD      Moving from lying on back to sitting on the side of a flat bed without bedrails? 3  -UD      Moving to and from a bed to a chair (including a wheelchair)? 3  -UD      Standing up from a chair using your arms (e.g., wheelchair, bedside chair)? 3  -UD      Climbing 3-5 steps with a railing? 2  -UD      To walk in hospital room? 3  -UD      AM-PAC 6 Clicks Score 17  -UD        User Key  (r) = Recorded By, (t) = Taken By, (c) = Cosigned By    Initials Name Provider Type    UD Bharti Masters PTA Physical Therapy Assistant           Time Calculation:         PT Charges       11/20/17 1004          Time Calculation    PT Received On 11/20/17  -      PT Goal Re-Cert Due Date 11/23/17  -      Time Calculation- PT    Total Timed Code Minutes- PT 24 minute(s)  -        User Key  (r) = Recorded By, (t) = Taken By, (c) = Cosigned By    Initials Name Provider Type    UD Bharti Masters PTA Physical Therapy Assistant          Therapy Charges for Today     Code Description Service Date Service Provider Modifiers Qty    44781440442 HC PT THER PROC EA 15 MIN 11/20/2017 Bharti Masters PTA GP 1    12724925156 HC GAIT TRAINING EA 15 MIN  11/20/2017 Bharti Masters PTA GP 1    66742174910 HC PT THER SUPP EA 15 MIN 11/20/2017 Bharti Masters PTA GP 1          PT G-Codes  Outcome Measure Options: AM-PAC 6 Clicks Basic Mobility (PT)    Bharti Masters PTA  11/20/2017            Electronically signed by Bharti Masters PTA at 11/20/2017 10:05 AM        Occupational Therapy Notes (last 72 hours) (Notes from 11/18/2017  8:36 AM through 11/21/2017  8:36 AM)     No notes of this type exist for this encounter.

## 2017-11-21 NOTE — THERAPY TREATMENT NOTE
Acute Care - Physical Therapy Treatment Note  [facility]     Patient Name: Jaclyn Garcia  : 1934  MRN: [id number]  Today's Date: 2017  Onset of Illness/Injury or Date of Surgery Date: 17  Date of Referral to PT: 11/10/17  Referring Physician: MD Romelia    Admit Date: 2017    Visit Dx:    ICD-10-CM ICD-9-CM   1. Sepsis due to Escherichia coli A41.51 038.42     995.91   2. Acute renal failure, unspecified acute renal failure type N17.9 584.9   3. Impaired functional mobility, balance, gait, and endurance Z74.09 V49.89     Patient Active Problem List   Diagnosis   • Sepsis, probable source is urine   • Acute renal failure (ARF)   • UTI (urinary tract infection)   • Gastroenteritis               Adult Rehabilitation Note       17 1130 17 0850       Rehab Assessment/Intervention    Discipline physical therapy assistant  -UD physical therapy assistant  -UD     Document Type therapy note (daily note)  -UD therapy note (daily note)  -UD     Subjective Information agree to therapy;no complaints  -UD agree to therapy;no complaints  -UD     Patient Effort, Rehab Treatment good  -UD good  -UD     Symptoms Noted During/After Treatment fatigue  -UD fatigue  -UD     Precautions/Limitations fall precautions  -UD fall precautions  -UD     Recorded by [UD] Bharti Masters PTA [UD] Bharti Masters PTA     Vital Signs    O2 Delivery Post Treatment room air  -UD room air  -UD     Pre Patient Position Sitting  -UD Sitting  -UD     Intra Patient Position Standing  -UD Standing  -UD     Post Patient Position Sitting  -UD Sitting  -UD     Recorded by [UD] Bharti Masters PTA [UD] Bharti Masters PTA     Pain Assessment    Pain Assessment No/denies pain  -UD No/denies pain  -UD     Pain Score 0  -UD 0  -UD     Post Pain Score 0  -UD 0  -UD     Recorded by [UD] Bharti Masters PTA [UD] Bharti Masters PTA     Cognitive Assessment/Intervention    Orientation Status oriented x 4  -UD oriented x 4  -UD     Follows  Commands/Answers Questions 100% of the time  -% of the time  -UD     Personal Safety Interventions fall prevention program maintained  -UD fall prevention program maintained  -UD     Recorded by [UD] Bharti Masters PTA [UD] Bharti Masters PTA     Bed Mobility, Assessment/Treatment    Bed Mob, Supine to Sit, [place] not tested   up in chair  -UD not tested   up in chair  -UD     Recorded by [UD] Bharti Masters PTA [UD] Bharti Masters PTA     Transfer Assessment/Treatment    Transfers, Sit-Stand [place] contact guard assist  -UD contact guard assist;2 person assist required  -UD     Transfers, Stand-Sit [place] contact guard assist  -UD contact guard assist;2 person assist required  -UD     Transfers, Sit-Stand-Sit, Assist Device rolling walker  -UD rolling walker  -UD     Recorded by [UD] Bharti Masters PTA [UD] Bharti Masters PTA     Gait Assessment/Treatment    Gait, [place] Level contact guard assist  -UD contact guard assist;2 person assist required  -UD     Gait, Assistive Device rolling walker  -UD rolling walker  -UD     Gait, Distance (Feet) 350  -  -UD     Gait, Gait Deviations savan[place] decreased  -UD step length decreased  -UD     Gait, Safety Issues step length decreased  -UD step length decreased  -UD     Gait, Impairments strength decreased  -UD strength decreased  -UD     Recorded by [UD] Bharti Masters PTA [UD] Bharti Masters PTA     Therapy Exercises    Bilateral Lower Extremities AROM:;10 reps;sitting  -UD AROM:;10 reps;sitting  -UD     Bilateral Upper Extremity AROM:;10 reps;sitting  -UD AROM:;10 reps;sitting  -UD     Recorded by [UD] Bharti Masters PTA [UD] Bharti Masters PTA     Positioning and Restraints    Pre-Treatment Position sitting in chair/recliner  -UD sitting in chair/recliner  -UD     Post Treatment Position chair  -UD chair  -UD     In Chair notified nsg;reclined;sitting;call light within reach;with family/caregiver;legs elevated  -UD notified  nsg;reclined;sitting;call light within reach;with family/caregiver;legs elevated  -UD     Recorded by [UD] Bharti Masters PTA [UD] Bharti Masters PTA       User Key  (r) = Recorded By, (t) = Taken By, (c) = Cosigned By    Initials Name Effective Dates    UD Bharti Masters PTA 06/22/15 -                 IP PT Goals       11/21/17 1215 11/20/17 1002 11/16/17 1407    Bed Mobility PT LTG    Bed Mobility PT LTG, Outcome goal ongoing  -UD goal ongoing  -UD goal ongoing  -UD    Transfer Training PT LTG    Transfer Training PT LTG, Outcome goal ongoing  -UD goal ongoing  -UD goal ongoing  -UD    Gait Training PT LTG    Gait Training Goal PT LTG, Outcome goal partially met  -UD goal partially met  -UD goal ongoing  -UD    Stair Training PT LTG    Stair Training Goal PT LTG, Outcome goal ongoing  -UD goal ongoing  -UD goal ongoing  -UD      11/15/17 0923 11/14/17 1213 11/13/17 1006    Bed Mobility PT LTG    Bed Mobility PT LTG, Date Established   11/13/17  -LS    Bed Mobility PT LTG, Time to Achieve   2 wks  -LS    Bed Mobility PT LTG, Activity Type   supine to sit/sit to supine  -LS    Bed Mobility PT LTG, [place] Level   supervision required  -LS    Bed Mobility PT LTG, Outcome goal ongoing  -LS goal ongoing  -LS     Transfer Training PT LTG    Transfer Training PT LTG, Date Established   11/13/17  -LS    Transfer Training PT LTG, Time to Achieve   2 wks  -LS    Transfer Training PT LTG, Activity Type   sit to stand/stand to sit  -LS    Transfer Training PT LTG, [place] Level   supervision required  -LS    Transfer Training PT LTG, Assist Device   walker, rolling  -LS    Transfer Training PT LTG, Outcome goal ongoing  -LS goal ongoing  -LS     Gait Training PT LTG    Gait Training Goal PT LTG, Date Established   11/13/17  -LS    Gait Training Goal PT LTG, Time to Achieve   2 wks  -LS    Gait Training Goal PT LTG, [place] Level   supervision required  -LS    Gait Training Goal PT LTG, Assist Device   walker,  rolling  -LS    Gait Training Goal PT LTG, Distance to Achieve   200  -LS    Gait Training Goal PT LTG, Outcome goal ongoing  -LS goal ongoing  -LS     Stair Training PT LTG    Stair Training Goal PT LTG, Date Established   11/13/17  -LS    Stair Training Goal PT LTG, Time to Achieve   2 wks  -LS    Stair Training Goal PT LTG, Number of Steps   2  -LS    Stair Training Goal PT LTG, [place] Level   contact guard assist  -LS    Stair Training Goal PT LTG, Outcome goal ongoing  -LS goal ongoing  -LS       User Key  (r) = Recorded By, (t) = Taken By, (c) = Cosigned By    Initials Name Provider Type    UD Bharti Masters, PTA Physical Therapy Assistant    LS Buffy Xiao, PT Physical Therapist          Physical Therapy Education     Title: PT OT SLP Therapies (Active)     Topic: Physical Therapy (Active)     Point: Mobility training (Active)    Learning Progress Summary    Learner Readiness Method Response Comment Documented by Status   Patient Eagmerry E,D DU,NR   11/21/17 1215 Done    Eager E,D DU,NR   11/20/17 1002 Done    Acceptance E,D DU,NR   11/16/17 1407 Done    Acceptance E,D NR Discussed at length benefit of progressing mobility to promote PLOF. Needs encouragement.  11/15/17 0922 Active    Acceptance E VU,NR   11/14/17 2348 Done    Acceptance E,D NR   11/14/17 1213 Active    Acceptance E VU,NR   11/14/17 0414 Done    Acceptance E,D NR   11/13/17 1005 Active   Family Eager E,D DU,NR   11/21/17 1215 Done    Eager E,D DU,NR   11/20/17 1002 Done    Acceptance E,D DU,NR   11/16/17 1407 Done    Acceptance E VU,NR   11/14/17 2348 Done    Acceptance E VU,NR   11/14/17 0414 Done    Acceptance E,D NR   11/13/17 1005 Active   Significant Other Acceptance E,D NR   11/14/17 1213 Active               Point: Home exercise program (Active)    Learning Progress Summary    Learner Readiness Method Response Comment Documented by Status   Patient Eager E,D DU,NR   11/21/17 1215 Done    Eager E,D  DU,NR   11/20/17 1002 Done    Acceptance E,D DU,NR   11/16/17 1407 Done    Acceptance E,D NR Discussed at length benefit of progressing mobility to promote PLOF. Needs encouragement.  11/15/17 0922 Active    Acceptance E VU,NR   11/14/17 2348 Done    Acceptance E,D NR   11/14/17 1213 Active    Acceptance E VU,[facility] 11/14/17 0414 Done   Family Eager E,D DU,NR   11/21/17 1215 Done    Eager E,D DU,NR   11/20/17 1002 Done    Acceptance E,D DU,NR   11/16/17 1407 Done    Acceptance E VU,[facility] 11/14/17 2348 Done    Acceptance E VU,[facility] 11/14/17 0414 Done   Significant Other Acceptance E,D NR   11/14/17 1213 Active               Point: Body mechanics (Active)    Learning Progress Summary    Learner Readiness Method Response Comment Documented by Status   Patient Eager E,D DU,NR   11/21/17 1215 Done    Eager E,D DU,NR   11/20/17 1002 Done    Acceptance E,D DU,NR   11/16/17 1407 Done    Acceptance E,D NR Discussed at length benefit of progressing mobility to promote PLOF. Needs encouragement.  11/15/17 0922 Active    Acceptance E VU,[facility] 11/14/17 2348 Done    Acceptance E,D NR   11/14/17 1213 Active    Acceptance E VU,[facility] 11/14/17 0414 Done    Acceptance E,D NR   11/13/17 1005 Active   Family Eager E,D DU,NR   11/21/17 1215 Done    Eager E,D DU,NR   11/20/17 1002 Done    Acceptance E,D DU,NR   11/16/17 1407 Done    Acceptance E VU,[facility] 11/14/17 2348 Done    Acceptance E VU,[facility] 11/14/17 0414 Done    Acceptance E,D NR   11/13/17 1005 Active   Significant Other Acceptance E,D NR   11/14/17 1213 Active               Point: Precautions (Active)    Learning Progress Summary    Learner Readiness Method Response Comment Documented by Status   Patient Eager E,D DU,NR   11/21/17 1215 Done    Eager E,D DU,NR   11/20/17 1002 Done    Acceptance E,D JONY,NR  UD 11/16/17 1407 Done    Acceptance E,D NR Discussed at length benefit of progressing mobility to promote PLOF. Needs  encouragement.  11/15/17 0922 Active    Acceptance E VU,NR   11/14/17 2348 Done    Acceptance E,D NR   11/14/17 1213 Active    Acceptance E VU,NR   11/14/17 0414 Done    Acceptance E,D NR   11/13/17 1005 Active   Family Eager E,D DU,NR   11/21/17 1215 Done    Eager E,D DU,NR   11/20/17 1002 Done    Acceptance E,D DU,NR   11/16/17 1407 Done    Acceptance E VU,NR   11/14/17 2348 Done    Acceptance E VU,NR   11/14/17 0414 Done    Acceptance E,D NR   11/13/17 1005 Active   Significant Other Acceptance E,D NR   11/14/17 1213 Active                      User Key     Initials Effective Dates Name Provider Type Discipline     06/22/15 -  Bharti Masters PTA Physical Therapy Assistant PT     06/19/15 -  Buffy Xiao, PT Physical Therapist PT     07/03/17 -  Claudia Dietrich RN Registered Nurse Nurse                    PT Recommendation and Plan  Anticipated Discharge Disposition: skilled nursing facility  PT Frequency: daily  Plan of Care Review  Plan Of Care Reviewed With: patient  Progress: improving  Outcome Summary/Follow up Plan: pt up in chair.agrred to walk 350 ft today.went over exercises          Outcome Measures       11/21/17 1130 11/20/17 0850       How much help from another person do you currently need...    Turning from your back to your side while in flat bed without using bedrails? 3  -UD 3  -UD     Moving from lying on back to sitting on the side of a flat bed without bedrails? 3  -UD 3  -UD     Moving to and from a bed to a chair (including a wheelchair)? 3  -UD 3  -UD     Standing up from a chair using your arms (e.g., wheelchair, bedside chair)? 3  -UD 3  -UD     Climbing 3-5 steps with a railing? 2  -UD 2  -UD     To walk in hospital room? 3  -UD 3  -UD     AM-PAC 6 Clicks Score 17  -UD 17  -UD       User Key  (r) = Recorded By, (t) = Taken By, (c) = Cosigned By    Initials Name Provider Type    UD Bharti Masters PTA Physical Therapy Assistant           Time Calculation:          PT Charges       11/21/17 1217          Time Calculation    PT Received On 11/21/17  -UD      PT Goal Re-Cert Due Date 11/23/17  -UD      Time Calculation- PT    Total Timed Code Minutes- PT 24 minute(s)  -UD        User Key  (r) = Recorded By, (t) = Taken By, (c) = Cosigned By    Initials Name Provider Type    UD Bharti Masters PTA Physical Therapy Assistant          Therapy Charges for Today     Code Description Service Date Service Provider Modifiers Qty    26051667739 HC PT THER PROC EA 15 MIN 11/20/2017 Bharti Masters, PTA GP 1    71732806128 HC GAIT TRAINING EA 15 MIN 11/20/2017 Bharti Masters, PTA GP 1    31986268030 HC PT THER SUPP EA 15 MIN 11/20/2017 Bharti Masters, PTA GP 1    74402125793 HC PT THER PROC EA 15 MIN 11/21/2017 Bharti Masters, PTA GP 1    53588872968 HC GAIT TRAINING EA 15 MIN 11/21/2017 Bharti Masters, PTA GP 1    81867783837 HC PT THER SUPP EA 15 MIN 11/21/2017 Bharti Masters, RAYA GP 1          PT G-Codes  Outcome Measure Options: AM-PAC 6 Clicks Basic Mobility (PT)    Bharti Masters PTA  11/21/2017

## 2017-11-21 NOTE — PROGRESS NOTES
"   LOS: 13 days    Patient Care Team:  No Known Provider as PCP - General    Reason For Visit:  F/U ELLE  Subjective   Patient seen on dialysis, she is more lethargic had received Phenergan last night.    Review of Systems:   More lethargic and unable to get more information from her.      Objective       atorvastatin 40 mg Oral Nightly   ceftriaxone 1 g Intravenous Q24H   heparin (porcine) 5,000 Units Subcutaneous Q8H   pantoprazole 40 mg Oral BID AC            Vital Signs:  Blood pressure 146/84, pulse 91, temperature 98.1 °F (36.7 °C), resp. rate 16, height 63\" (160 cm), weight 151 lb 9.6 oz (68.8 kg), SpO2 96 %.    Flowsheet Rows         First Filed Value    Admission Height  63\" (160 cm) Documented at 11/08/2017 2018    Admission Weight  166 lb 0.1 oz (75.3 kg) Documented at 11/08/2017 2018 11/20 0701 - 11/21 0700  In: 600 [P.O.:600]  Out: 1900 [Urine:1900]    Physical Exam:    General Appearance:Not oriented, mild distress.  Eyes: PER, EOMI   Lungs: Rhonchi's and Rales are heard at the bases.  Good air entry.  Equal chest movement  Heart/CV: regular rhythm & normal rate, no murmur, no gallop, no rub and trace right edema, 1+ on left edema  Abdomen: not distended, soft, non-tender, no masses,  bowel sounds present  Skin: No rash, Warm and dry  Extremities: Positive edema.  No cyanosis.  Neuro: Being all extremities.    Radiology:    Labs:    Results from last 7 days  Lab Units 11/21/17  0427 11/20/17  0424 11/19/17  0820  11/18/17  0754  11/17/17  0604 11/15/17  0415   WBC 10*3/mm3  --   --   --   --  14.32*  --  14.57* 10.90*   HEMOGLOBIN g/dL 8.8* 8.7* 9.3*  < > 8.8*  9.0*  < > 5.7* 9.0*   HEMATOCRIT % 27.1* 27.1* 28.0*  < > 26.4*  27.5*  < > 17.9* 27.6*   PLATELETS 10*3/mm3  --   --   --   --  328  --  286 217   < > = values in this interval not displayed.    Results from last 7 days  Lab Units 11/20/17  1041 11/20/17  0424 11/19/17 2025 11/19/17  0820 11/18/17  0417 11/17/17  0401 11/16/17  0427 " 11/15/17  0415   SODIUM mmol/L  --  141  --  142 142 141 140 143   POTASSIUM mmol/L 4.2 3.4* 3.4* 2.8* 3.5 3.8 3.4* 3.5   CHLORIDE mmol/L  --  100  --  101 107 107 106 108   CO2 mmol/L  --  30.0  --  30.0 24.0 25.0 25.0 24.0   BUN mg/dL  --  29*  --  29* 26* 33* 39* 49*   CREATININE mg/dL  --  1.80*  --  1.90* 2.00* 2.20* 2.60* 3.10*   CALCIUM mg/dL  --  8.5*  --  8.9 8.9 8.7 9.0 8.7   PHOSPHORUS mg/dL  --  3.7  --   --  3.8 3.9 3.9 4.2   MAGNESIUM mg/dL  --   --   --   --   --  1.7  --  2.0   ALBUMIN g/dL  --  3.40  --   --  3.20 3.20 3.20 3.30       Results from last 7 days  Lab Units 11/20/17  0424   GLUCOSE mg/dL 109*         Results from last 7 days  Lab Units 11/18/17  0417   ALK PHOS U/L 57   BILIRUBIN mg/dL 1.2   ALT (SGPT) U/L 15   AST (SGOT) U/L 25             Results from last 7 days  Lab Units 11/17/17  0946   COLOR UA  Yellow   CLARITY UA  Clear   PH, URINE  6.5   SPECIFIC GRAVITY, URINE  <=1.005   GLUCOSE UA  Negative   KETONES UA  Negative   BILIRUBIN UA  Negative   PROTEIN UA  Trace*   BLOOD UA  Moderate (2+)*   LEUKOCYTES UA  Small (1+)*   NITRITE UA  Negative            Assessment     Principal Problem:    Sepsis, probable source is urine  Active Problems:    Acute renal failure (ARF)    UTI (urinary tract infection)    Gastroenteritis    Impression: ELLE SECONDARY TO NSAIA USE AND SEPSIS. Renal function Stable slow improvement, Electrolytes will be replaced.  Anemia Stable  Edema.   Patient will be dialyzed again tomorrow we'll try to get some more fluid off.    discuss with Dr. Evans.   TO REHAB SOON. WE CAN F/U IN OUR [facility] AFTER DISCHARGE.       Zara Wilde MD  11/21/17  9:24 AM

## 2017-11-21 NOTE — PLAN OF CARE
Problem: Patient Care Overview (Adult)  Goal: Plan of Care Review  Outcome: Ongoing (interventions implemented as appropriate)    11/21/17 1215   Coping/Psychosocial Response Interventions   Plan Of Care Reviewed With patient   Patient Care Overview   Progress improving   Outcome Evaluation   Outcome Summary/Follow up Plan pt up in chair.agrred to walk 350 ft today.went over exercises         Problem: Inpatient Physical Therapy  Goal: Bed Mobility Goal LTG- PT  Outcome: Ongoing (interventions implemented as appropriate)    11/13/17 1006 11/21/17 1215   Bed Mobility PT LTG   Bed Mobility PT LTG, Date Established 11/13/17 --    Bed Mobility PT LTG, Time to Achieve 2 wks --    Bed Mobility PT LTG, Activity Type supine to sit/sit to supine --    Bed Mobility PT LTG, [place] Level supervision required --    Bed Mobility PT LTG, Outcome --  goal ongoing       Goal: Transfer Training Goal 1 LTG- PT  Outcome: Ongoing (interventions implemented as appropriate)    11/13/17 1006 11/21/17 1215   Transfer Training PT LTG   Transfer Training PT LTG, Date Established 11/13/17 --    Transfer Training PT LTG, Time to Achieve 2 wks --    Transfer Training PT LTG, Activity Type sit to stand/stand to sit --    Transfer Training PT LTG, [place] Level supervision required --    Transfer Training PT LTG, Assist Device walker, rolling --    Transfer Training PT LTG, Outcome --  goal ongoing       Goal: Gait Training Goal LTG- PT  Outcome: Ongoing (interventions implemented as appropriate)    11/13/17 1006 11/21/17 1215   Gait Training PT LTG   Gait Training Goal PT LTG, Date Established 11/13/17 --    Gait Training Goal PT LTG, Time to Achieve 2 wks --    Gait Training Goal PT LTG, [place] Level supervision required --    Gait Training Goal PT LTG, Assist Device walker, rolling --    Gait Training Goal PT LTG, Distance to Achieve 200 --    Gait Training Goal PT LTG, Outcome --  goal partially met       Goal: Stair Training  Goal LTG- PT  Outcome: Ongoing (interventions implemented as appropriate)    11/13/17 1006 11/21/17 1215   Stair Training PT LTG   Stair Training Goal PT LTG, Date Established 11/13/17 --    Stair Training Goal PT LTG, Time to Achieve 2 wks --    Stair Training Goal PT LTG, Number of Steps 2 --    Stair Training Goal PT LTG, [place] Level contact guard assist --    Stair Training Goal PT LTG, Outcome --  goal ongoing
